# Patient Record
Sex: MALE | Race: WHITE | NOT HISPANIC OR LATINO | Employment: FULL TIME | URBAN - METROPOLITAN AREA
[De-identification: names, ages, dates, MRNs, and addresses within clinical notes are randomized per-mention and may not be internally consistent; named-entity substitution may affect disease eponyms.]

---

## 2017-09-11 ENCOUNTER — EH NON-PROVIDER (OUTPATIENT)
Dept: OCCUPATIONAL MEDICINE | Facility: CLINIC | Age: 32
End: 2017-09-11

## 2017-09-11 ENCOUNTER — EMPLOYEE HEALTH (OUTPATIENT)
Dept: OCCUPATIONAL MEDICINE | Facility: CLINIC | Age: 32
End: 2017-09-11

## 2017-09-11 ENCOUNTER — HOSPITAL ENCOUNTER (OUTPATIENT)
Facility: MEDICAL CENTER | Age: 32
End: 2017-09-11
Attending: PREVENTIVE MEDICINE
Payer: COMMERCIAL

## 2017-09-11 VITALS
BODY MASS INDEX: 31.68 KG/M2 | WEIGHT: 239 LBS | SYSTOLIC BLOOD PRESSURE: 114 MMHG | RESPIRATION RATE: 12 BRPM | TEMPERATURE: 98 F | DIASTOLIC BLOOD PRESSURE: 78 MMHG | HEART RATE: 72 BPM | OXYGEN SATURATION: 99 % | HEIGHT: 73 IN

## 2017-09-11 DIAGNOSIS — Z02.1 ENCOUNTER FOR PRE-EMPLOYMENT HEALTH SCREENING EXAMINATION: ICD-10-CM

## 2017-09-11 DIAGNOSIS — Z02.1 PRE-EMPLOYMENT DRUG SCREENING: ICD-10-CM

## 2017-09-11 LAB
AMP AMPHETAMINE: NORMAL
BAR BARBITURATES: NORMAL
BZO BENZODIAZEPINES: NORMAL
COC COCAINE: NORMAL
INT CON NEG: NORMAL
INT CON POS: NORMAL
MDMA ECSTASY: NORMAL
MET METHAMPHETAMINES: NORMAL
MTD METHADONE: NORMAL
OPI OPIATES: NORMAL
OXY OXYCODONE: NORMAL
PCP PHENCYCLIDINE: NORMAL
POC URINE DRUG SCREEN OCDRS: NORMAL
THC: NORMAL

## 2017-09-11 PROCEDURE — 86480 TB TEST CELL IMMUN MEASURE: CPT | Performed by: PREVENTIVE MEDICINE

## 2017-09-11 PROCEDURE — 94375 RESPIRATORY FLOW VOLUME LOOP: CPT | Performed by: PREVENTIVE MEDICINE

## 2017-09-11 PROCEDURE — 80305 DRUG TEST PRSMV DIR OPT OBS: CPT | Performed by: PREVENTIVE MEDICINE

## 2017-09-11 PROCEDURE — 8915 PR COMPREHENSIVE PHYSICAL: Performed by: PREVENTIVE MEDICINE

## 2017-09-11 PROCEDURE — 90686 IIV4 VACC NO PRSV 0.5 ML IM: CPT | Performed by: PREVENTIVE MEDICINE

## 2017-09-13 LAB
M TB TUBERC IFN-G BLD QL: NEGATIVE
M TB TUBERC IFN-G/MITOGEN IGNF BLD: -0
M TB TUBERC IGNF/MITOGEN IGNF CONTROL: 50.36 [IU]/ML
MITOGEN IGNF BCKGRD COR BLD-ACNC: 0.02 [IU]/ML

## 2018-06-21 ENCOUNTER — HOSPITAL ENCOUNTER (OUTPATIENT)
Facility: MEDICAL CENTER | Age: 33
End: 2018-06-21
Payer: COMMERCIAL

## 2018-06-21 LAB
BDY FAT % MEASURED: 22.4 %
BP DIAS: 76 MMHG
BP SYS: 127 MMHG
CHOLEST SERPL-MCNC: 212 MG/DL (ref 100–199)
DIABETES HTDIA: NO
EVENT NAME HTEVT: NORMAL
FASTING HTFAS: YES
GLUCOSE SERPL-MCNC: 112 MG/DL (ref 65–99)
HDLC SERPL-MCNC: 59 MG/DL
HYPERTENSION HTHYP: NO
LDLC SERPL CALC-MCNC: 122 MG/DL
SCREENING LOC CITY HTCIT: NORMAL
SCREENING LOC STATE HTSTA: NORMAL
SCREENING LOCATION HTLOC: NORMAL
SMOKING HTSMO: NO
SUBSCRIBER ID HTSID: NORMAL
TRIGL SERPL-MCNC: 155 MG/DL (ref 0–149)

## 2018-06-21 PROCEDURE — S5190 WELLNESS ASSESSMENT BY NONPH: HCPCS

## 2018-06-21 PROCEDURE — 82947 ASSAY GLUCOSE BLOOD QUANT: CPT

## 2018-06-21 PROCEDURE — 80061 LIPID PANEL: CPT

## 2018-10-05 ENCOUNTER — IMMUNIZATION (OUTPATIENT)
Dept: OCCUPATIONAL MEDICINE | Facility: CLINIC | Age: 33
End: 2018-10-05

## 2018-10-05 DIAGNOSIS — Z23 NEED FOR VACCINATION: ICD-10-CM

## 2018-10-16 ENCOUNTER — HOSPITAL ENCOUNTER (EMERGENCY)
Facility: MEDICAL CENTER | Age: 33
End: 2018-10-16
Attending: EMERGENCY MEDICINE
Payer: COMMERCIAL

## 2018-10-16 VITALS
SYSTOLIC BLOOD PRESSURE: 134 MMHG | OXYGEN SATURATION: 91 % | TEMPERATURE: 98.4 F | DIASTOLIC BLOOD PRESSURE: 83 MMHG | HEART RATE: 97 BPM | RESPIRATION RATE: 17 BRPM | HEIGHT: 73 IN | BODY MASS INDEX: 30.77 KG/M2 | WEIGHT: 232.14 LBS

## 2018-10-16 DIAGNOSIS — E86.0 DEHYDRATION: ICD-10-CM

## 2018-10-16 DIAGNOSIS — R10.84 GENERALIZED ABDOMINAL PAIN: ICD-10-CM

## 2018-10-16 LAB
ALBUMIN SERPL BCP-MCNC: 3.8 G/DL (ref 3.2–4.9)
ALBUMIN/GLOB SERPL: 1.4 G/DL
ALP SERPL-CCNC: 45 U/L (ref 30–99)
ALT SERPL-CCNC: 54 U/L (ref 2–50)
ANION GAP SERPL CALC-SCNC: 7 MMOL/L (ref 0–11.9)
AST SERPL-CCNC: 28 U/L (ref 12–45)
BASOPHILS # BLD AUTO: 0.2 % (ref 0–1.8)
BASOPHILS # BLD: 0.02 K/UL (ref 0–0.12)
BILIRUB SERPL-MCNC: 0.8 MG/DL (ref 0.1–1.5)
BUN SERPL-MCNC: 14 MG/DL (ref 8–22)
CALCIUM SERPL-MCNC: 8.6 MG/DL (ref 8.4–10.2)
CHLORIDE SERPL-SCNC: 103 MMOL/L (ref 96–112)
CO2 SERPL-SCNC: 24 MMOL/L (ref 20–33)
CREAT SERPL-MCNC: 1.03 MG/DL (ref 0.5–1.4)
EOSINOPHIL # BLD AUTO: 0 K/UL (ref 0–0.51)
EOSINOPHIL NFR BLD: 0 % (ref 0–6.9)
ERYTHROCYTE [DISTWIDTH] IN BLOOD BY AUTOMATED COUNT: 37 FL (ref 35.9–50)
GLOBULIN SER CALC-MCNC: 2.8 G/DL (ref 1.9–3.5)
GLUCOSE SERPL-MCNC: 121 MG/DL (ref 65–99)
HCT VFR BLD AUTO: 47.9 % (ref 42–52)
HGB BLD-MCNC: 16.6 G/DL (ref 14–18)
IMM GRANULOCYTES # BLD AUTO: 0.03 K/UL (ref 0–0.11)
IMM GRANULOCYTES NFR BLD AUTO: 0.3 % (ref 0–0.9)
LIPASE SERPL-CCNC: 25 U/L (ref 7–58)
LYMPHOCYTES # BLD AUTO: 0.38 K/UL (ref 1–4.8)
LYMPHOCYTES NFR BLD: 4.1 % (ref 22–41)
MCH RBC QN AUTO: 28.9 PG (ref 27–33)
MCHC RBC AUTO-ENTMCNC: 34.7 G/DL (ref 33.7–35.3)
MCV RBC AUTO: 83.3 FL (ref 81.4–97.8)
MONOCYTES # BLD AUTO: 0.76 K/UL (ref 0–0.85)
MONOCYTES NFR BLD AUTO: 8.2 % (ref 0–13.4)
NEUTROPHILS # BLD AUTO: 8.13 K/UL (ref 1.82–7.42)
NEUTROPHILS NFR BLD: 87.2 % (ref 44–72)
NRBC # BLD AUTO: 0 K/UL
NRBC BLD-RTO: 0 /100 WBC
PLATELET # BLD AUTO: 189 K/UL (ref 164–446)
PMV BLD AUTO: 9.7 FL (ref 9–12.9)
POTASSIUM SERPL-SCNC: 3.6 MMOL/L (ref 3.6–5.5)
PROT SERPL-MCNC: 6.6 G/DL (ref 6–8.2)
RBC # BLD AUTO: 5.75 M/UL (ref 4.7–6.1)
SODIUM SERPL-SCNC: 134 MMOL/L (ref 135–145)
WBC # BLD AUTO: 9.3 K/UL (ref 4.8–10.8)

## 2018-10-16 PROCEDURE — 36415 COLL VENOUS BLD VENIPUNCTURE: CPT

## 2018-10-16 PROCEDURE — 80053 COMPREHEN METABOLIC PANEL: CPT

## 2018-10-16 PROCEDURE — 700111 HCHG RX REV CODE 636 W/ 250 OVERRIDE (IP): Performed by: EMERGENCY MEDICINE

## 2018-10-16 PROCEDURE — 96361 HYDRATE IV INFUSION ADD-ON: CPT

## 2018-10-16 PROCEDURE — 96376 TX/PRO/DX INJ SAME DRUG ADON: CPT

## 2018-10-16 PROCEDURE — 99285 EMERGENCY DEPT VISIT HI MDM: CPT

## 2018-10-16 PROCEDURE — 83690 ASSAY OF LIPASE: CPT

## 2018-10-16 PROCEDURE — 85025 COMPLETE CBC W/AUTO DIFF WBC: CPT

## 2018-10-16 PROCEDURE — 96374 THER/PROPH/DIAG INJ IV PUSH: CPT

## 2018-10-16 PROCEDURE — 700105 HCHG RX REV CODE 258: Performed by: EMERGENCY MEDICINE

## 2018-10-16 RX ORDER — FLUTICASONE PROPIONATE 50 MCG
1 SPRAY, SUSPENSION (ML) NASAL DAILY
Status: SHIPPED | COMMUNITY
End: 2022-08-19 | Stop reason: SDUPTHER

## 2018-10-16 RX ORDER — SODIUM CHLORIDE 9 MG/ML
1000 INJECTION, SOLUTION INTRAVENOUS ONCE
Status: COMPLETED | OUTPATIENT
Start: 2018-10-16 | End: 2018-10-16

## 2018-10-16 RX ORDER — ONDANSETRON 4 MG/1
4 TABLET, ORALLY DISINTEGRATING ORAL EVERY 8 HOURS PRN
Qty: 15 TAB | Refills: 0 | Status: SHIPPED | OUTPATIENT
Start: 2018-10-16 | End: 2021-06-29

## 2018-10-16 RX ORDER — ONDANSETRON 2 MG/ML
4 INJECTION INTRAMUSCULAR; INTRAVENOUS ONCE
Status: COMPLETED | OUTPATIENT
Start: 2018-10-16 | End: 2018-10-16

## 2018-10-16 RX ORDER — FEXOFENADINE HCL 180 MG/1
180 TABLET ORAL DAILY
Status: SHIPPED | COMMUNITY
End: 2022-08-19 | Stop reason: SDUPTHER

## 2018-10-16 RX ADMIN — SODIUM CHLORIDE 1000 ML: 9 INJECTION, SOLUTION INTRAVENOUS at 16:27

## 2018-10-16 RX ADMIN — SODIUM CHLORIDE 1000 ML: 9 INJECTION, SOLUTION INTRAVENOUS at 17:43

## 2018-10-16 RX ADMIN — SODIUM CHLORIDE 1000 ML: 9 INJECTION, SOLUTION INTRAVENOUS at 15:25

## 2018-10-16 RX ADMIN — ONDANSETRON HYDROCHLORIDE 4 MG: 2 INJECTION INTRAMUSCULAR; INTRAVENOUS at 18:45

## 2018-10-16 RX ADMIN — ONDANSETRON 4 MG: 2 INJECTION INTRAMUSCULAR; INTRAVENOUS at 15:25

## 2018-10-16 RX ADMIN — SODIUM CHLORIDE 1000 ML: 9 INJECTION, SOLUTION INTRAVENOUS at 18:46

## 2018-10-16 ASSESSMENT — PAIN SCALES - GENERAL
PAINLEVEL_OUTOF10: 0
PAINLEVEL_OUTOF10: 0

## 2018-10-16 NOTE — ED TRIAGE NOTES
"Chief Complaint   Patient presents with   • Abdominal Pain     started yesterday   • N/V   • Diarrhea     > 28 times since last night     /83   Pulse (!) 115   Temp 36.7 °C (98.1 °F)   Resp 18   Ht 1.854 m (6' 1\")   Wt 105.3 kg (232 lb 2.3 oz)   SpO2 94%   BMI 30.63 kg/m²     "

## 2018-10-16 NOTE — ED PROVIDER NOTES
ED Provider Note    CHIEF COMPLAINT  Chief Complaint   Patient presents with   • Abdominal Pain     started yesterday   • N/V   • Diarrhea     > 28 times since last night       IRLANDA Wilson is a 33 y.o. male who presents severe diarrhea.  Patient states he ate ribs from a restaurant last night.  His wife did not eat these.  2-3 hours later he started feeling full in his epigastric area and then proceeded to have diarrhea multiple times.  He thinks he had more than 28 diarrhea stools.  Watery brown.  Crampiness in his abdomen vaguely throughout.  He did vomit once this morning.  He was not able to orally hydrate last night because he just could not tolerate any fluids at all.  Currently feels like he cannot hold anything down at all.  He is try to drink fluids and they did not stay in his system.  He has a very dry mouth at this time feeling weak in general.  Nothing makes it better, nothing makes them worse.    REVIEW OF SYSTEMS  CONSTITUTIONAL:  Denies fever, chills, positive generalized weakness  EYES:  Denies photophobia or discharge   ENT: Positive dry throat  CARDIOVASCULAR:  Denies chest pain, palpitations or swelling  RESPIRATORY:  Denies cough or shortness of breath or difficulty breathing  GI: Positive vague abdominal cramping with nausea and vomiting this morning and multiple diarrhea stools of brown stool  MUSCULOSKELETAL: Positive generalized weakness but no joint swelling or back pain  SKIN:  No rash  ALLERGIC: No itchy rashes.  NEUROLOGIC:  Denies headache, focal weakness or numbness      PAST MEDICAL HISTORY  Healthy male  Occasional sinus allergies    FAMILY HISTORY  History reviewed. No pertinent family history.    SOCIAL HISTORY   reports that he has never smoked. He does not have any smokeless tobacco history on file. He reports that he drinks alcohol. He reports that he does not use drugs.    SURGICAL HISTORY  History reviewed. No pertinent surgical history.    CURRENT  "MEDICATIONS  Over-the-counter antihistamines    ALLERGIES  No Known Allergies    PHYSICAL EXAM  VITAL SIGNS: /83   Pulse (!) 115   Temp 36.7 °C (98.1 °F)   Resp 18   Ht 1.854 m (6' 1\")   Wt 105.3 kg (232 lb 2.3 oz)   SpO2 94%   BMI 30.63 kg/m²      Constitutional: Patient is awake alert person place and time. No acute respiratory distress Well developed, Well nourished, Non-toxic appearance.   HENT: Normocephalic, Atraumatic,  Bilateral external ears normal, Oropharynx very dry.    Eyes: Sclera and conjunctiva clear, No discharge.   Neck:  Supple    Cardiovascular: Heart is tachycardic regular no murmur   thorax & Lungs: Chest is symmetrical, with good breath sounds. No wheezing or crackles. No respiratory distress, No chest tenderness.   Abdomen:  Soft, vague tenderness pan abdomen.  No guarding or rebound.  Skin: Warm, Dry, no petechia, purpura or rash.   Extremities: No  edema.  Non tender.   Musculoskeletal: Good range of motion upper and lower extremities.    Neurologic: Alert & oriented.  Strength is symmetrical in the upper lower extremities.  Psychiatric: Cooperative friendly.      LABS:  Lab Results   Component Value Date    WBC 9.3 10/16/2018    HEMOGLOBIN 16.6 10/16/2018    HEMATOCRIT 47.9 10/16/2018    PLATELETCT 189 10/16/2018    SODIUM 134 (L) 10/16/2018    POTASSIUM 3.6 10/16/2018    CHLORIDE 103 10/16/2018    CO2 24 10/16/2018    BUN 14 10/16/2018    GLUCOSE 121 (H) 10/16/2018    CHOLSTRLTOT 212 (H) 06/21/2018     (H) 06/21/2018    ALTSGPT 54 (H) 10/16/2018    ASTSGOT 28 10/16/2018           COURSE & MEDICAL DECISION MAKING  Pertinent Labs & Imaging studies reviewed. (See chart for details)  Patient is obviously dehydrated.  I suspect that this may be some type of viral or bacterial enteritis.  He does not appear to have a surgical abdomen at this time.  I am going to give him IV fluids because he is clinically dehydrated he is unable to tolerate oral fluids he is tachycardic and " he has not held anything down since yesterday.      1640: Recheck.  Patient is feeling better.  Not voided yet.  We will give him a second liter of fluid.      1730.  Patient sitting up.  He is been to the bathroom.  He feels much better.  No abdominal pain currently.  He would like to go home and orally hydrate.  He understands are still at risk of intra-abdominal surgical abnormalities include appendicitis or other causes however at this time he does feel much better.  He will be rechecked in 12-24 hours either here or his primary care doctor.      1640 IV fluid resuscitation reevaluation: Patient still tachycardic has not voided yet we will give him a second liter of fluid    1930.  He is feeling much improved.  He is voided now.  He is received several liters of fluid which I believe is appropriate since he had severe diarrhea and dehydration and tachycardia.  He now feels much improved mucous membranes are moist no longer lightheaded.  Patient's liver enzymes mildly elevated.  This is unclear why this is.  Cannot rule out some type of hepatitis although he does not have any abdominal pain at all think that he has gallbladder issues.  Previous viral infection.  Although he understands his glucose is mildly elevated and his liver enzyme is high his will need to be followed up as an outpatient.  If his diarrhea continues we will need to obtain stool samples.    Believe he is stable for discharge home for close follow-up          FINAL IMPRESSION  1.  Dehydration  2.  Diarrhea  3.  Abdominal discomfort resolved     PLAN  1.  Dehydration information she  2.  Diarrhea information sheet  3.  Follow-up with his primary care doctor tomorrow  4.  Zofran as needed nausea and vomiting  5. Return to the emergency department for increased pains, fevers, vomiting or change in condition.  Electronically signed by: Brian Reyez, 10/16/2018 2:55 PM

## 2018-10-17 NOTE — ED NOTES
ERP at bedside to re-evaluate pt.    Physical Therapy Daily Treatment     Visit Count: 8  Plan of Care Dates: Initial: 6/23/2017 Through: 9/15/2017  Insurance Information: physical and speech therapy combined cap of $1980/$3700 per calendar year  Next Referring Provider Visit: 10/20/17    Referred by: Henry Lopez MD  Medical Diagnosis (from order):    Quadriceps tendon rupture, right, sequela [S76.111S]       Quadriceps tendon rupture, left, sequela [S76.112S]       Insurance: 1. MEDICARE  2. [employer]  FOR LIFE    Date of Onset: December 31, 2014   Diagnosis Precautions: none  Chart reviewed: Relevant co-morbidities, allergies, tests and medications: Hx of A-Fib       SUBJECTIVE   Pt reports no pain in either knee, is feeling fatigued- has been having stomach issues. Current Pain: 0/10. Â   Functional Change: Pt has not been feeling fatigued this week so has not been doing many exercises. OBJECTIVE   Posture/Observation:  Standing posture: bilateral knees locked in extension with slight forward flexed posture, use of SPC for ambulation   Â   Gait Analysis:  Pt ambulating with SPC bilateral knees held in extension throughout stance phase    Â   Range of Motion (degrees) Norm Left Right   Date Â  Initial Initial   Knee Flexion 135  WNL WNL   Knee Extension 0-5 WNL WNL   standard testing positions unless otherwise noted; Key: ranges are reported in active range of motion unless noted as AA=active assistive or P=passive range of motion, * denotes pain   Comments: Only those motions that were assessed are noted.   Â   Strength (out of 5) Left Right   Date Initial Initial   Hip Flexion 4- 4   Hip Extension Â  Â    Hip Abduction Â  Â    Hip Glut Medius Â  Â    Hip Adduction  Â  Â    Hip Internal Rotation Â  Â    Hip External Rotation Â  Â    Knee Flexion 5 5   Knee Extension 4-* 3+*   Ankle Dorsiflexion Â  Â    Ankle Plantar flexion Â  Â    Ankle Inversion Â  Â    Ankle Eversion Â  Â    standard testing positions unless otherwise noted,* denotes pain  Comments: "Only muscle strength that was assessed are noted. Â   Muscle Flexibility:  Hamstring - Left: within normal limits, Right: within normal limits   Â   Palpation:  No tenderness with palpation   Notable divot through distal right quad with quad set   Â   Joint Play Assessment:  Increased joint play through bilateral patella with quad set   Â   Functional Tests:  Sit To Stand: good anterior weight shift, knees lock into extension then pt completes hip extension to get upright posture  Â   Outcome Measures:   Lower Extremity Functional Scale: LEFS Calculated Total: 33 (0=extreme difficulty; 80=no difficulty)     Treatment   Therapeutic Exercise:   Exercise Repetitions Sets Position/Cues   Nustep, seat 8, level 4 6 minutes         Sit<>stand from chair   -green TB at knees   -with airex, without UE assist  5  5  5 1  1  1 UE assist at railing to ascend and descend    4"" step up   -forward   -lateral up and over    5  4   1  1 Bilateral; with rail to assist    Staggered tandem stance  2x20 sec 1 Bilateral; at rail for support    Standing hip abduction  10 1 Bilateral; at rail for support                      Comments:        Current Home Program (not performed this date except as noted above):   Seated quad set with mini SLR   Seated SAQ  Standing calf raises at counter   Seated glut sets   Forward weight shifts   Clamshells   Sidelying hip abduction     ASSESSMENT   Pt reports no pain at end of session, just fatigued. Pt demonstrating improved sit to stand without UE assist from airex pad and improved anterior and lateral weight shift with knee flexed during step ups- will continue to address. Pt would benefit from continued skilled PT services in order to address functional deficits and progress toward goals. Pain after treatment: 0/10  Result of above outlined education: Verbalizes understanding and Demonstrates understanding    Goals: To be obtained by end of this plan of care:  1.  Patient independent with " modified and progressed home exercise program.  2. Patient will increase involved knee strength to 4+/5, 4/5 to aid in completing transfers including low and soft surfaces. 3. Patient will be able to ambulate modified independent/independent with least restrictive device for 30 minutes with minimal pain/difficulty to improve function in grocery shopping, ambulate to physician appointments and community interaction. 4. Patient will be able to ambulate 2 steps independent/modified independent for home access with minimal pain/difficulty. 5. Patient will be able to tolerate standing activities for greater than or equal to 30 minutes with minimal pain/difficulty  6. Lower Extremity Functional Scale: Patient will complete form to reflect an improved score from initial score of 33 to greater than or equal to 42 (0=extreme difficulty; 80=no difficulty) to indicate pt reported improvement in function/disability/impairment (minimal detectable change: 9 points). PLAN   Progress quad strengthening- trial TRX squats when able to ; sidestepping/hip abduction; leg press and calf raises on leg press; modified tandem balance with reaching     THERAPY DAILY BILLING   Primary Insurance: MEDICARE  Secondary Insurance: [facility]  FOR LIFE    Evaluation Procedures:  No evaluation codes were used on this date of service    Timed Procedures:  Therapeutic Exercise, 40 minutes    Untimed Procedures:  No untimed codes were used on this date of service    Total Treatment Time: 40 minutes        G-Code:  G-Code Score ABN form  reporting not required this treatment session  Modifier based on outcome measure(s)/functional testing/clinical judgement as listed above    The referring provider's electronic or written signature on the evaluation authorizes the therapy plan of care and certifies the need for these services, furnished under this plan of care while under their care. Physician Signature on file.

## 2018-10-17 NOTE — ED NOTES
2nd Liter NSS infused, pt had another loose BM, still unable to urinate. Dr. Reyez aware and placed order for another 1 L bolus.

## 2018-10-17 NOTE — ED NOTES
ERP and pt discussed d/c instructions, pt agreeable to plan. Patient verbalized understanding of discharge instructions, no questions at this time. VS stable, patient will ambulate to exit with d/c instructions and rx in hand.

## 2018-11-30 PROCEDURE — 90686 IIV4 VACC NO PRSV 0.5 ML IM: CPT | Mod: SPV | Performed by: PREVENTIVE MEDICINE

## 2018-11-30 PROCEDURE — 90471 IMMUNIZATION ADMIN: CPT | Mod: SPV | Performed by: PREVENTIVE MEDICINE

## 2019-09-11 ENCOUNTER — IMMUNIZATION (OUTPATIENT)
Dept: OCCUPATIONAL MEDICINE | Facility: CLINIC | Age: 34
End: 2019-09-11

## 2019-09-11 DIAGNOSIS — Z23 NEED FOR VACCINATION: ICD-10-CM

## 2019-09-11 PROCEDURE — 90686 IIV4 VACC NO PRSV 0.5 ML IM: CPT | Performed by: PREVENTIVE MEDICINE

## 2019-09-20 ENCOUNTER — HOSPITAL ENCOUNTER (OUTPATIENT)
Dept: LAB | Facility: MEDICAL CENTER | Age: 34
End: 2019-09-20
Payer: COMMERCIAL

## 2019-09-20 LAB
BDY FAT % MEASURED: 23.2 %
BP DIAS: 79 MMHG
BP SYS: 123 MMHG
CHOLEST SERPL-MCNC: 231 MG/DL (ref 100–199)
DIABETES HTDIA: NO
EVENT NAME HTEVT: NORMAL
FASTING HTFAS: YES
GLUCOSE SERPL-MCNC: 101 MG/DL (ref 65–99)
HDLC SERPL-MCNC: 70 MG/DL
HYPERTENSION HTHYP: NO
LDLC SERPL CALC-MCNC: 136 MG/DL
SCREENING LOC CITY HTCIT: NORMAL
SCREENING LOC STATE HTSTA: NORMAL
SCREENING LOCATION HTLOC: NORMAL
SMOKING HTSMO: NO
SUBSCRIBER ID HTSID: NORMAL
TRIGL SERPL-MCNC: 126 MG/DL (ref 0–149)

## 2019-09-20 PROCEDURE — S5190 WELLNESS ASSESSMENT BY NONPH: HCPCS

## 2019-09-20 PROCEDURE — 82947 ASSAY GLUCOSE BLOOD QUANT: CPT

## 2019-09-20 PROCEDURE — 36415 COLL VENOUS BLD VENIPUNCTURE: CPT

## 2019-09-20 PROCEDURE — 80061 LIPID PANEL: CPT

## 2020-07-29 ENCOUNTER — OFFICE VISIT (OUTPATIENT)
Dept: PHYSICAL MEDICINE AND REHAB | Facility: MEDICAL CENTER | Age: 35
End: 2020-07-29
Payer: COMMERCIAL

## 2020-07-29 VITALS
DIASTOLIC BLOOD PRESSURE: 68 MMHG | SYSTOLIC BLOOD PRESSURE: 124 MMHG | HEART RATE: 83 BPM | TEMPERATURE: 97.2 F | OXYGEN SATURATION: 94 %

## 2020-07-29 DIAGNOSIS — S93.422A SPRAIN OF DELTOID LIGAMENT OF LEFT ANKLE, INITIAL ENCOUNTER: ICD-10-CM

## 2020-07-29 DIAGNOSIS — S93.421A SPRAIN OF DELTOID LIGAMENT OF RIGHT ANKLE, INITIAL ENCOUNTER: ICD-10-CM

## 2020-07-29 PROCEDURE — 99204 OFFICE O/P NEW MOD 45 MIN: CPT | Performed by: PHYSICAL MEDICINE & REHABILITATION

## 2020-07-29 RX ORDER — MELOXICAM 15 MG/1
15 TABLET ORAL DAILY
Qty: 30 TAB | Refills: 0 | Status: SHIPPED | OUTPATIENT
Start: 2020-07-29 | End: 2020-08-28

## 2020-07-29 NOTE — PROGRESS NOTES
New patient note    Physiatry (physical medicine and  Rehabilitation), interventional spine and sports medicine    Date of Service: 07/29/2020    Chief complaint:   Chief Complaint   Patient presents with   • New Patient     Knee and ankle pain       HISTORY    HPI: Bassem Wilson MD 35 y.o. male who presents today for evaluation of chronic ankle pain.  He reports that he has had chronic ankle sprains that started in high-school while playing basketball.  He wears braces for ankle support when he hikes and plays basketball that helps him prevent rolling his ankles.  He has done PT in the past.  This seems to help minimize pain, but his splints are worn out and he needs new ones.    He also has a history of knee pain.  He reports Partial ACL right, MCL Left, medial meniscus bilaterally.  He has also used knee braces.    No acute symptoms or swelling    Remote history of injections.     He has taken mobic for relief of symptoms in the past.      Medical records review:    Previous treatments:    Physical Therapy: Yes    Medications the patient is tried: NSAIDs    Previous interventions: none    Previous surgeries to relieve the above pain:  none      ROS:   Red Flags ROS:   Fever, Chills, Sweats: Denies  Involuntary Weight Loss: Denies  Bladder Incontinence: Denies  Bowel Incontinence: Denies  Saddle Anesthesia: Denies    All other systems reviewed and negative.       PMHx:   History reviewed. No pertinent past medical history.    PSHx:   History reviewed. No pertinent surgical history.    Family history   History reviewed. No pertinent family history.      Medications:   Current Outpatient Medications   Medication   • meloxicam (MOBIC) 15 MG tablet   • multivitamin (THERAGRAN) Tab   • fluticasone (FLONASE) 50 MCG/ACT nasal spray   • fexofenadine (ALLEGRA ALLERGY) 180 MG tablet   • ondansetron (ZOFRAN ODT) 4 MG TABLET DISPERSIBLE     No current facility-administered medications for this visit.        Allergies:  "  Allergies   Allergen Reactions   • Ancef [Cefazolin] Anaphylaxis     \"difficulty breathing\"   • Amoxicillin Hives     \"hives\"       Social Hx:   Social History     Socioeconomic History   • Marital status:      Spouse name: Not on file   • Number of children: Not on file   • Years of education: Not on file   • Highest education level: Not on file   Occupational History   • Not on file   Social Needs   • Financial resource strain: Not on file   • Food insecurity     Worry: Not on file     Inability: Not on file   • Transportation needs     Medical: Not on file     Non-medical: Not on file   Tobacco Use   • Smoking status: Never Smoker   • Smokeless tobacco: Never Used   Substance and Sexual Activity   • Alcohol use: Yes   • Drug use: No   • Sexual activity: Not on file   Lifestyle   • Physical activity     Days per week: Not on file     Minutes per session: Not on file   • Stress: Not on file   Relationships   • Social connections     Talks on phone: Not on file     Gets together: Not on file     Attends [employer] service: Not on file     Active member of club or organization: Not on file     Attends meetings of clubs or organizations: Not on file     Relationship status: Not on file   • Intimate partner violence     Fear of current or ex partner: Not on file     Emotionally abused: Not on file     Physically abused: Not on file     Forced sexual activity: Not on file   Other Topics Concern   • Not on file   Social History Narrative   • Not on file         EXAMINATION     Physical Exam:   Vitals: /68 (BP Location: Left arm, Patient Position: Sitting)   Pulse 83   Temp 36.2 °C (97.2 °F) (Temporal)   SpO2 94%     Constitutional:   Cooperation: Fully cooperates with exam  Appearance: Well-groomed, well-nourished, not disheveled, in no acute distress    Eyes: No scleral icterus, no proptosis     ENT -no obvious auditory deficits, wearing a face mask    Skin -no rashes or lesions noted     Respiratory-  " breathing comfortable on room air, no audible wheezing    Cardiovascular- No lower extremity edema is noted.     Psychiatric- alert and oriented ×3. Normal affect.     Gait - normal gait, no use of ambulatory device, nonantalgic. He is able to perform heel and toe walking without difficulty    Musculoskeletal -     Thoracic/Lumbar Spine/Sacral Spine/Hips   Inspection: No evidence of atrophy in bilateral lower extremities throughout     Knees  No effusions.  No medial or lateral joint line tenderness.  No medial or lateral instability.  Negative Lachman's bilaterally    Ankles  No effusions.  There is increased laxity of inversion bilaterally.  No tenderness over the joint, no tenderness over the left or right lateral ankle    Neuro     Key points for the international standards for neurological classification of spinal cord injury (ISNCSCI) to light touch.     Dermatome R L   L2 2 2   L3 2 2   L4 2 2   L5 2 2   S1 2 2   S2 2 2       Motor Exam Lower Extremities    ? Myotome R L   Hip flexion L2 5 5   Knee extension L3 5 5   Ankle dorsiflexion L4 5 5   Toe extension L5 5 5   Ankle plantarflexion S1 5 5       Clonus of the ankle negative bilaterally     Reflexes  ?  R L   Biceps  2+ 2+   Brachioradialis  2+ 2+   Patella  2+ 2+   Achilles   2+ 2+       MEDICAL DECISION MAKING    Medical records review: see under HPI section.     DATA    Labs:   Lab Results   Component Value Date/Time    SODIUM 134 (L) 10/16/2018 03:28 PM    POTASSIUM 3.6 10/16/2018 03:28 PM    CHLORIDE 103 10/16/2018 03:28 PM    CO2 24 10/16/2018 03:28 PM    ANION 7.0 10/16/2018 03:28 PM    GLUCOSE 101 (H) 09/20/2019 07:52 AM    BUN 14 10/16/2018 03:28 PM    CREATININE 1.03 10/16/2018 03:28 PM    CALCIUM 8.6 10/16/2018 03:28 PM    ASTSGOT 28 10/16/2018 03:28 PM    ALTSGPT 54 (H) 10/16/2018 03:28 PM    TBILIRUBIN 0.8 10/16/2018 03:28 PM    ALBUMIN 3.8 10/16/2018 03:28 PM    TOTPROTEIN 6.6 10/16/2018 03:28 PM    GLOBULIN 2.8 10/16/2018 03:28 PM     AGRATIO 1.4 10/16/2018 03:28 PM       No results found for: PROTHROMBTM, INR     Lab Results   Component Value Date/Time    WBC 9.3 10/16/2018 03:28 PM    RBC 5.75 10/16/2018 03:28 PM    HEMOGLOBIN 16.6 10/16/2018 03:28 PM    HEMATOCRIT 47.9 10/16/2018 03:28 PM    MCV 83.3 10/16/2018 03:28 PM    MCH 28.9 10/16/2018 03:28 PM    MCHC 34.7 10/16/2018 03:28 PM    MPV 9.7 10/16/2018 03:28 PM    NEUTSPOLYS 87.20 (H) 10/16/2018 03:28 PM    LYMPHOCYTES 4.10 (L) 10/16/2018 03:28 PM    MONOCYTES 8.20 10/16/2018 03:28 PM    EOSINOPHILS 0.00 10/16/2018 03:28 PM    BASOPHILS 0.20 10/16/2018 03:28 PM        No results found for: HBA1C     Imaging: I personally reviewed following images, these are my reads  None available    IMAGING radiology reads. I reviewed the following radiology reads None available                                                                                                                                                                              Diagnosis   Visit Diagnoses     ICD-10-CM   1. Sprain of deltoid ligament of right ankle, initial encounter  S93.421A   2. Sprain of deltoid ligament of left ankle, initial encounter  S93.422A           ASSESSMENT:  Bassem Wilson MD 35 y.o. male seen for above     Bassem was seen today for new patient.    Diagnoses and all orders for this visit:    Sprain of deltoid ligament of right ankle, initial encounter  -     meloxicam (MOBIC) 15 MG tablet; Take 1 Tab by mouth every day for 30 days.    Sprain of deltoid ligament of left ankle, initial encounter  -     meloxicam (MOBIC) 15 MG tablet; Take 1 Tab by mouth every day for 30 days.      1. Discussed use of ankle supports for the left and right.  No acute findings, with some evidence of laxity.  Plan to continue to use ankle supports, no xrays ordered.  2. Use of mobic for severe pain.  Script given.  3. Discussed that we could try to get old records as it relates to his knees.  Hold for now.  No plans for  treatment at this time, no acute pain.    Follow-up: Return if symptoms worsen or fail to improve.        Please note that this dictation was created using voice recognition software. I have made every reasonable attempt to correct obvious errors but there may be errors of grammar and content that I may have overlooked prior to finalization of this note.      Peter Nguyen MD  Physical Medicine and Rehabilitation  Interventional Spine and Sports Physiatry  [facility]

## 2020-09-23 ENCOUNTER — IMMUNIZATION (OUTPATIENT)
Dept: OCCUPATIONAL MEDICINE | Facility: CLINIC | Age: 35
End: 2020-09-23

## 2020-09-23 DIAGNOSIS — Z23 NEED FOR VACCINATION: ICD-10-CM

## 2020-09-23 PROCEDURE — 90686 IIV4 VACC NO PRSV 0.5 ML IM: CPT | Performed by: PREVENTIVE MEDICINE

## 2020-12-16 DIAGNOSIS — Z23 NEED FOR VACCINATION: ICD-10-CM

## 2020-12-17 ENCOUNTER — APPOINTMENT (OUTPATIENT)
Dept: FAMILY PLANNING/WOMEN'S HEALTH CLINIC | Facility: IMMUNIZATION CENTER | Age: 35
End: 2020-12-17
Attending: FAMILY MEDICINE
Payer: COMMERCIAL

## 2020-12-17 DIAGNOSIS — Z23 NEED FOR VACCINATION: ICD-10-CM

## 2020-12-17 PROCEDURE — 0001A PFIZER SARS-COV-2 VACCINE: CPT

## 2020-12-17 PROCEDURE — 91300 PFIZER SARS-COV-2 VACCINE: CPT

## 2020-12-18 ENCOUNTER — IMMUNIZATION (OUTPATIENT)
Dept: FAMILY PLANNING/WOMEN'S HEALTH CLINIC | Facility: IMMUNIZATION CENTER | Age: 35
End: 2020-12-18

## 2020-12-18 DIAGNOSIS — Z23 ENCOUNTER FOR VACCINATION: Primary | ICD-10-CM

## 2021-01-08 ENCOUNTER — IMMUNIZATION (OUTPATIENT)
Dept: FAMILY PLANNING/WOMEN'S HEALTH CLINIC | Facility: IMMUNIZATION CENTER | Age: 36
End: 2021-01-08
Attending: FAMILY MEDICINE
Payer: COMMERCIAL

## 2021-01-08 DIAGNOSIS — Z23 ENCOUNTER FOR VACCINATION: Primary | ICD-10-CM

## 2021-01-08 PROCEDURE — 91300 PFIZER SARS-COV-2 VACCINE: CPT

## 2021-01-08 PROCEDURE — 0002A PFIZER SARS-COV-2 VACCINE: CPT

## 2021-06-29 ENCOUNTER — OFFICE VISIT (OUTPATIENT)
Dept: URGENT CARE | Facility: CLINIC | Age: 36
End: 2021-06-29
Payer: COMMERCIAL

## 2021-06-29 ENCOUNTER — APPOINTMENT (OUTPATIENT)
Dept: URGENT CARE | Facility: CLINIC | Age: 36
End: 2021-06-29
Payer: COMMERCIAL

## 2021-06-29 ENCOUNTER — APPOINTMENT (OUTPATIENT)
Dept: RADIOLOGY | Facility: MEDICAL CENTER | Age: 36
End: 2021-06-29
Attending: EMERGENCY MEDICINE
Payer: COMMERCIAL

## 2021-06-29 ENCOUNTER — APPOINTMENT (OUTPATIENT)
Dept: RADIOLOGY | Facility: IMAGING CENTER | Age: 36
End: 2021-06-29
Attending: PHYSICIAN ASSISTANT
Payer: COMMERCIAL

## 2021-06-29 ENCOUNTER — HOSPITAL ENCOUNTER (EMERGENCY)
Facility: MEDICAL CENTER | Age: 36
End: 2021-06-29
Attending: EMERGENCY MEDICINE
Payer: COMMERCIAL

## 2021-06-29 VITALS
TEMPERATURE: 98.1 F | HEART RATE: 82 BPM | SYSTOLIC BLOOD PRESSURE: 114 MMHG | RESPIRATION RATE: 20 BRPM | BODY MASS INDEX: 32.2 KG/M2 | HEIGHT: 73 IN | DIASTOLIC BLOOD PRESSURE: 68 MMHG | OXYGEN SATURATION: 97 % | WEIGHT: 243 LBS

## 2021-06-29 VITALS
TEMPERATURE: 97.4 F | DIASTOLIC BLOOD PRESSURE: 88 MMHG | OXYGEN SATURATION: 96 % | HEIGHT: 73 IN | WEIGHT: 244.49 LBS | BODY MASS INDEX: 32.4 KG/M2 | RESPIRATION RATE: 16 BRPM | HEART RATE: 82 BPM | SYSTOLIC BLOOD PRESSURE: 127 MMHG

## 2021-06-29 DIAGNOSIS — R00.0 TACHYCARDIA: ICD-10-CM

## 2021-06-29 DIAGNOSIS — R07.81 PLEURITIC CHEST PAIN: ICD-10-CM

## 2021-06-29 DIAGNOSIS — R07.2 PRECORDIAL CHEST PAIN: ICD-10-CM

## 2021-06-29 LAB
ALBUMIN SERPL BCP-MCNC: 4.4 G/DL (ref 3.2–4.9)
ALBUMIN/GLOB SERPL: 1.8 G/DL
ALP SERPL-CCNC: 55 U/L (ref 30–99)
ALT SERPL-CCNC: 68 U/L (ref 2–50)
ANION GAP SERPL CALC-SCNC: 11 MMOL/L (ref 7–16)
AST SERPL-CCNC: 33 U/L (ref 12–45)
BASOPHILS # BLD AUTO: 0.5 % (ref 0–1.8)
BASOPHILS # BLD: 0.03 K/UL (ref 0–0.12)
BILIRUB SERPL-MCNC: 0.5 MG/DL (ref 0.1–1.5)
BUN SERPL-MCNC: 14 MG/DL (ref 8–22)
CALCIUM SERPL-MCNC: 9.2 MG/DL (ref 8.4–10.2)
CHLORIDE SERPL-SCNC: 101 MMOL/L (ref 96–112)
CO2 SERPL-SCNC: 25 MMOL/L (ref 20–33)
CREAT SERPL-MCNC: 0.91 MG/DL (ref 0.5–1.4)
D DIMER PPP IA.FEU-MCNC: 0.42 UG/ML (FEU) (ref 0–0.5)
EKG IMPRESSION: NORMAL
EOSINOPHIL # BLD AUTO: 0.05 K/UL (ref 0–0.51)
EOSINOPHIL NFR BLD: 0.8 % (ref 0–6.9)
ERYTHROCYTE [DISTWIDTH] IN BLOOD BY AUTOMATED COUNT: 36.8 FL (ref 35.9–50)
FLUAV RNA SPEC QL NAA+PROBE: NEGATIVE
FLUBV RNA SPEC QL NAA+PROBE: NEGATIVE
GLOBULIN SER CALC-MCNC: 2.5 G/DL (ref 1.9–3.5)
GLUCOSE SERPL-MCNC: 140 MG/DL (ref 65–99)
HCT VFR BLD AUTO: 45.6 % (ref 42–52)
HGB BLD-MCNC: 15.8 G/DL (ref 14–18)
IMM GRANULOCYTES # BLD AUTO: 0.02 K/UL (ref 0–0.11)
IMM GRANULOCYTES NFR BLD AUTO: 0.3 % (ref 0–0.9)
LYMPHOCYTES # BLD AUTO: 1.72 K/UL (ref 1–4.8)
LYMPHOCYTES NFR BLD: 27.9 % (ref 22–41)
MCH RBC QN AUTO: 29.2 PG (ref 27–33)
MCHC RBC AUTO-ENTMCNC: 34.6 G/DL (ref 33.7–35.3)
MCV RBC AUTO: 84.3 FL (ref 81.4–97.8)
MONOCYTES # BLD AUTO: 0.6 K/UL (ref 0–0.85)
MONOCYTES NFR BLD AUTO: 9.7 % (ref 0–13.4)
NEUTROPHILS # BLD AUTO: 3.74 K/UL (ref 1.82–7.42)
NEUTROPHILS NFR BLD: 60.8 % (ref 44–72)
NRBC # BLD AUTO: 0 K/UL
NRBC BLD-RTO: 0 /100 WBC
PLATELET # BLD AUTO: 228 K/UL (ref 164–446)
PMV BLD AUTO: 9.7 FL (ref 9–12.9)
POTASSIUM SERPL-SCNC: 3.9 MMOL/L (ref 3.6–5.5)
PROT SERPL-MCNC: 6.9 G/DL (ref 6–8.2)
RBC # BLD AUTO: 5.41 M/UL (ref 4.7–6.1)
RSV RNA SPEC QL NAA+PROBE: NEGATIVE
SARS-COV-2 RNA RESP QL NAA+PROBE: NOTDETECTED
SODIUM SERPL-SCNC: 137 MMOL/L (ref 135–145)
SPECIMEN SOURCE: NORMAL
TROPONIN T SERPL-MCNC: <6 NG/L (ref 6–19)
WBC # BLD AUTO: 6.2 K/UL (ref 4.8–10.8)

## 2021-06-29 PROCEDURE — 93005 ELECTROCARDIOGRAM TRACING: CPT | Performed by: EMERGENCY MEDICINE

## 2021-06-29 PROCEDURE — 84484 ASSAY OF TROPONIN QUANT: CPT

## 2021-06-29 PROCEDURE — 93005 ELECTROCARDIOGRAM TRACING: CPT

## 2021-06-29 PROCEDURE — 85025 COMPLETE CBC W/AUTO DIFF WBC: CPT

## 2021-06-29 PROCEDURE — 93000 ELECTROCARDIOGRAM COMPLETE: CPT | Performed by: PHYSICIAN ASSISTANT

## 2021-06-29 PROCEDURE — 99284 EMERGENCY DEPT VISIT MOD MDM: CPT

## 2021-06-29 PROCEDURE — 0241U HCHG SARS-COV-2 COVID-19 NFCT DS RESP RNA 4 TRGT MIC: CPT

## 2021-06-29 PROCEDURE — 71045 X-RAY EXAM CHEST 1 VIEW: CPT

## 2021-06-29 PROCEDURE — 85379 FIBRIN DEGRADATION QUANT: CPT

## 2021-06-29 PROCEDURE — 80053 COMPREHEN METABOLIC PANEL: CPT

## 2021-06-29 PROCEDURE — 71046 X-RAY EXAM CHEST 2 VIEWS: CPT | Mod: TC,FY | Performed by: PHYSICIAN ASSISTANT

## 2021-06-29 PROCEDURE — C9803 HOPD COVID-19 SPEC COLLECT: HCPCS | Performed by: EMERGENCY MEDICINE

## 2021-06-29 PROCEDURE — 36415 COLL VENOUS BLD VENIPUNCTURE: CPT

## 2021-06-29 PROCEDURE — 99204 OFFICE O/P NEW MOD 45 MIN: CPT | Performed by: PHYSICIAN ASSISTANT

## 2021-06-29 RX ORDER — ALBUTEROL SULFATE 90 UG/1
2 AEROSOL, METERED RESPIRATORY (INHALATION) EVERY 6 HOURS PRN
Qty: 8.5 G | Refills: 0 | Status: SHIPPED | OUTPATIENT
Start: 2021-06-29 | End: 2022-08-19 | Stop reason: SDUPTHER

## 2021-06-29 RX ORDER — IBUPROFEN 200 MG
400 TABLET ORAL EVERY 6 HOURS PRN
COMMUNITY
End: 2021-07-16

## 2021-06-29 RX ORDER — CLINDAMYCIN HYDROCHLORIDE 300 MG/1
CAPSULE ORAL
COMMUNITY
Start: 2021-05-12 | End: 2021-06-29

## 2021-06-29 RX ORDER — IBUPROFEN 800 MG/1
800 TABLET ORAL
COMMUNITY
Start: 2021-05-12 | End: 2021-06-29

## 2021-06-29 RX ORDER — CHLORHEXIDINE GLUCONATE ORAL RINSE 1.2 MG/ML
SOLUTION DENTAL
COMMUNITY
Start: 2021-05-12 | End: 2021-06-29

## 2021-06-29 ASSESSMENT — ENCOUNTER SYMPTOMS
NEAR-SYNCOPE: 0
CLAUDICATION: 0
DIZZINESS: 1
NAUSEA: 0

## 2021-06-29 ASSESSMENT — PAIN SCALES - GENERAL: PAINLEVEL: 7=MODERATE-SEVERE PAIN

## 2021-06-29 NOTE — ED PROVIDER NOTES
"ED Provider Note    ED Provider    Means of arrival: Private vehicle  History obtained from: Patient  History limited by: None    CHIEF COMPLAINT  Chief Complaint   Patient presents with   • Chest Pain     Gradual onset L sided sharp chest pain starting today.    • Shortness of Breath     Today along with CP.        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  The pain is worse with taking deep breaths.  There is no discomfort with movement of the arm.    The pain does not radiate.  Is mild, waxing waning.    He has no history of cardiac disease no history of lung disease.  No history of diabetes high blood pressure or high cholesterol.  He is a non-smoker.    He has been in a car for an extended period time driving, along with being with other people.    No recent fevers chills cough congestion or flulike symptoms.  No nausea vomiting or sweating.    REVIEW OF SYSTEMS  See HPI for further details. All other systems are negative.     PAST MEDICAL HISTORY       SOCIAL HISTORY  Social History     Tobacco Use   • Smoking status: Never Smoker   • Smokeless tobacco: Never Used   Vaping Use   • Vaping Use: Never used   Substance and Sexual Activity   • Alcohol use: Yes   • Drug use: No   • Sexual activity: Not on file       SURGICAL HISTORY  patient denies any surgical history    CURRENT MEDICATIONS  Home Medications    **Home medications have not yet been reviewed for this encounter**         ALLERGIES  Allergies   Allergen Reactions   • Ancef [Cefazolin] Anaphylaxis     \"difficulty breathing\"   • Amoxicillin Hives     \"hives\"       PHYSICAL EXAM  VITAL SIGNS: /88   Pulse 82   Temp 36.3 °C (97.4 °F) (Temporal)   Resp 16   Ht 1.854 m (6' 1\")   Wt 111 kg (244 lb 7.8 oz)   SpO2 96%   BMI 32.26 kg/m²   Constitutional: Alert in no apparent distress.  HENT: No signs of trauma, Mucous membranes are moist   Eyes:  Conjunctiva normal, Non-icteric. "   Neck: Normal range of motion, No tenderness, Supple,  Lymphatic: No lymphadenopathy noted.   Cardiovascular: Regular rate and rhythm, no murmurs.   Thorax & Lungs: Normal breath sounds, No respiratory distress, No wheezing, No chest tenderness.   Abdomen: Bowel sounds normal, Soft, No tenderness, No masses, No pulsatile masses. No peritoneal signs.  Skin: Warm, Dry,Normal color  Back: No bony tenderness, No CVA tenderness.   Extremities:No edema, No tenderness, No cyanosis,    Musculoskeletal: Good range of motion in all major joints. No tenderness to palpation or major deformities noted.   Neurologic: Alert ,Oriented x4, Normal motor function, Normal sensory function, No focal deficits noted.   Psychiatric: Affect normal, Judgment normal, Mood normal.       MEDICAL DECISION MAKING  This is a 35 y.o. male who presents symptoms as described above.  There is concerns for cardiac disease, pulmonary embolism, and musculoskeletal pain.  Covid test will be done.  Troponin and D-dimer were done for evaluation    DIAGNOSTIC STUDIES / PROCEDURES    EKG  Results for orders placed or performed during the hospital encounter of 06/29/21   CBC with Differential   Result Value Ref Range    WBC 6.2 4.8 - 10.8 K/uL    RBC 5.41 4.70 - 6.10 M/uL    Hemoglobin 15.8 14.0 - 18.0 g/dL    Hematocrit 45.6 42.0 - 52.0 %    MCV 84.3 81.4 - 97.8 fL    MCH 29.2 27.0 - 33.0 pg    MCHC 34.6 33.7 - 35.3 g/dL    RDW 36.8 35.9 - 50.0 fL    Platelet Count 228 164 - 446 K/uL    MPV 9.7 9.0 - 12.9 fL    Neutrophils-Polys 60.80 44.00 - 72.00 %    Lymphocytes 27.90 22.00 - 41.00 %    Monocytes 9.70 0.00 - 13.40 %    Eosinophils 0.80 0.00 - 6.90 %    Basophils 0.50 0.00 - 1.80 %    Immature Granulocytes 0.30 0.00 - 0.90 %    Nucleated RBC 0.00 /100 WBC    Neutrophils (Absolute) 3.74 1.82 - 7.42 K/uL    Lymphs (Absolute) 1.72 1.00 - 4.80 K/uL    Monos (Absolute) 0.60 0.00 - 0.85 K/uL    Eos (Absolute) 0.05 0.00 - 0.51 K/uL    Baso (Absolute) 0.03 0.00 -  0.12 K/uL    Immature Granulocytes (abs) 0.02 0.00 - 0.11 K/uL    NRBC (Absolute) 0.00 K/uL   Complete Metabolic Panel (CMP)   Result Value Ref Range    Sodium 137 135 - 145 mmol/L    Potassium 3.9 3.6 - 5.5 mmol/L    Chloride 101 96 - 112 mmol/L    Co2 25 20 - 33 mmol/L    Anion Gap 11.0 7.0 - 16.0    Glucose 140 (H) 65 - 99 mg/dL    Bun 14 8 - 22 mg/dL    Creatinine 0.91 0.50 - 1.40 mg/dL    Calcium 9.2 8.4 - 10.2 mg/dL    AST(SGOT) 33 12 - 45 U/L    ALT(SGPT) 68 (H) 2 - 50 U/L    Alkaline Phosphatase 55 30 - 99 U/L    Total Bilirubin 0.5 0.1 - 1.5 mg/dL    Albumin 4.4 3.2 - 4.9 g/dL    Total Protein 6.9 6.0 - 8.2 g/dL    Globulin 2.5 1.9 - 3.5 g/dL    A-G Ratio 1.8 g/dL   Troponin   Result Value Ref Range    Troponin T <6 6 - 19 ng/L   D-DIMER   Result Value Ref Range    D-Dimer Screen 0.42 0.00 - 0.50 ug/mL (FEU)   COV-2, FLU A/B, AND RSV BY PCR (2-4 HOURS CEPHEID): Collect NP swab in VTM    Specimen: Respirate   Result Value Ref Range    Influenza virus A RNA Negative Negative    Influenza virus B, PCR Negative Negative    RSV, PCR Negative Negative    SARS-CoV-2 by PCR NotDetected     SARS-CoV-2 Source NP Swab    ESTIMATED GFR   Result Value Ref Range    GFR If African American >60 >60 mL/min/1.73 m 2    GFR If Non African American >60 >60 mL/min/1.73 m 2   EKG   Result Value Ref Range    Report       [facility].    Test Date:  2021  Pt Name:    JOSÉ MIGUEL KUNZ                Department: [facility]  MRN:        [id number]                      Room:  Gender:     Male                         Technician: [id number]  :        1985                   Requested By:ER TRIAGE PROTOCOL  Order #:    [id number]                    Reading MD: FLAKITO ALEJO DMIKEY    Measurements  Intervals                                Axis  Rate:       84                           P:          56  KS:         149                          QRS:        53  QRSD:       88                           T:           53  QT:         359  QTc:        425    Interpretive Statements  Sinus rhythm  No previous ECG available for comparison  Electronically Signed On 6- 16:48:01 PDT by FLAKITO ALEJO D.O.           LABS      RADIOLOGY  DX-CHEST-PORTABLE (1 VIEW)   Final Result      No radiographic evidence of acute cardiopulmonary process.          COURSE  Pertinent Labs & Imaging studies reviewed. (See chart for details)    4:49 PM - Patient seen and examined at bedside. Discussed plan of care,      This patient has normal troponin and EKG despite several hours of discomfort.  He has no cardiac risk factors and I do not believe this is related to cardiac disease.    Cardiology consult will be obtained as an outpatient he is stable for discharge home.    His blood sugar is 140 which is mildly elevated he did eat today, and that may be the cause of this he is informed of this so that he can get this monitored and evaluated as an outpatient.    Discharged home in stable condition    FINAL IMPRESSION  1. Precordial chest pain        The note accurately reflects work and decisions made by me.  Flakito Alejo D.O.  6/29/2021  4:52 PM

## 2021-06-29 NOTE — PROGRESS NOTES
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 Episode onset: 2 days. The problem occurs intermittently. The problem has been unchanged. The pain is present in the substernal region. The pain is moderate. Associated symptoms include dizziness. Pertinent negatives include no claudication, nausea or near-syncope. The pain is aggravated by deep breathing.   His family medical history is significant for hyperlipidemia.   No personal history of cardiac related issues.  Symptoms are not reproducible with movement.  No notable injury or trauma to the area.  Patient states he was in surgery today and noticed that he became very lightheaded and his heart rate was elevated. States his oxygen has been down in 93 to 97 range when typically is at 100. 2/2 COVID vaccine  PMH:  has no past medical history on file.  MEDS:   Current Outpatient Medications:   •  ibuprofen (MOTRIN) 200 MG Tab, Take 400 mg by mouth every 6 hours as needed., Disp: , Rfl:   •  multivitamin (THERAGRAN) Tab, Take 1 Tab by mouth every day., Disp: , Rfl:   •  fluticasone (FLONASE) 50 MCG/ACT nasal spray, Spray 1 Spray in nose every day., Disp: , Rfl:   •  fexofenadine (ALLEGRA ALLERGY) 180 MG tablet, Take 180 mg by mouth every day., Disp: , Rfl:   ALLERGIES:   Allergies   Allergen Reactions   • Ancef [Cefazolin] Anaphylaxis     \"difficulty breathing\"   • Amoxicillin Hives     \"hives\"     SURGHX: History reviewed. No pertinent surgical history.  SOCHX:  reports that he has never smoked. He has never used smokeless tobacco. He reports current alcohol use. He reports that he does not use drugs.  FH: No early onset of family cardiac related illness    Review of Systems   Cardiovascular: Positive for chest pain. Negative for claudication and " "near-syncope.   Gastrointestinal: Negative for nausea.   Neurological: Positive for dizziness.      Objective:   /68 (BP Location: Left arm, Patient Position: Sitting, BP Cuff Size: Adult long)   Pulse 82   Temp 36.7 °C (98.1 °F) (Temporal)   Resp 20   Ht 1.854 m (6' 1\")   Wt 110 kg (243 lb)   SpO2 97%   BMI 32.06 kg/m²   Physical Exam  Vitals and nursing note reviewed.   Constitutional:       General: He is not in acute distress.     Appearance: Normal appearance. He is well-developed. He is not ill-appearing or toxic-appearing.   HENT:      Head: Normocephalic and atraumatic.      Right Ear: Hearing normal.      Left Ear: Hearing normal.   Eyes:      Conjunctiva/sclera: Conjunctivae normal.   Cardiovascular:      Rate and Rhythm: Normal rate and regular rhythm.      Heart sounds: Normal heart sounds. No murmur heard.   No friction rub. No gallop.    Pulmonary:      Effort: Pulmonary effort is normal.   Musculoskeletal:      Right lower leg: No edema.      Left lower leg: No edema.      Comments: Normal movement in all 4 extremities.  No tenderness to palpation to the calves bilaterally   Skin:     General: Skin is warm and dry.   Neurological:      Mental Status: He is alert and oriented to person, place, and time.      GCS: GCS eye subscore is 4. GCS verbal subscore is 5. GCS motor subscore is 6.      Coordination: Coordination normal.   Psychiatric:         Mood and Affect: Mood normal.       DX CHEST  FINDINGS:  Cardiomediastinal contour is within normal limits.  No focal pulmonary consolidation.  No pleural fluid collection or pneumothorax.  No major bony abnormality is seen.     IMPRESSION:     No acute cardiopulmonary disease.     EKG   Normal sinus rhythm. Rate of 79 no acute ST wave abnormalities at this time. No previous EKG to compare with.    Assessment/Plan:   Assessment    1. Tachycardia  - EKG - Clinic Performed    2. Pleuritic chest pain  - EKG - Clinic Performed  - DX-CHEST-2 VIEWS; " Future    Other orders  - ibuprofen (MOTRIN) 200 MG Tab; Take 400 mg by mouth every 6 hours as needed.  Discussed with patient that we cannot completely rule out cardiac etiology and I am concerned with new onset of his symptoms although he did have normal findings of EKG and chest x-ray today we cannot explain why he is having the acute symptoms in urgent care setting and believe he needs further work-up at this time.  He is agreeable with this plan and feels comfortable driving by private vehicle to the [facility] emergency room.  Differential diagnosis, natural history, supportive care, and indications for immediate follow-up discussed.     Patient agreeable to plan.  [facility] was called and notified and given report.      Please note that this dictation was created using voice recognition software. I have made every reasonable attempt to correct obvious errors, but I expect that there are errors of grammar and possibly content that I did not discover before finalizing the note.    Rafael Layne PA-C

## 2021-06-29 NOTE — DISCHARGE INSTRUCTIONS
If no cardiac risk factors, your EKG is normal troponin is normal, D-dimer is negative, chest x-ray is normal and Covid test is negative.    You are stable for discharge home, referral was placed for cardiology for outpatient evaluation.  Use anti-inflammatory pain medications for discomfort and return if your symptoms change or worsen

## 2021-06-29 NOTE — ED TRIAGE NOTES
Chief Complaint   Patient presents with   • Chest Pain     Gradual onset L sided sharp chest pain starting today.    • Shortness of Breath     Today along with CP.    Pt reports going on a 7 hr drive on 23rd and returning on 27th.   Also had exposure to large group of people, pt is vacinated.

## 2021-06-30 NOTE — ED NOTES
Reviewed discharge instructions and prescription x 1 sent to selected Pharmacy w/ pt, verbalized understanding to information provided including follow up care and return precautions.  Pt denied questions/concerns.  Pt ambulated from ED.

## 2021-07-02 ENCOUNTER — HOSPITAL ENCOUNTER (OUTPATIENT)
Dept: RADIOLOGY | Facility: MEDICAL CENTER | Age: 36
End: 2021-07-02
Attending: INTERNAL MEDICINE
Payer: COMMERCIAL

## 2021-07-02 ENCOUNTER — HOSPITAL ENCOUNTER (OUTPATIENT)
Dept: CARDIOLOGY | Facility: MEDICAL CENTER | Age: 36
End: 2021-07-02
Attending: INTERNAL MEDICINE
Payer: COMMERCIAL

## 2021-07-02 ENCOUNTER — OFFICE VISIT (OUTPATIENT)
Dept: CARDIOLOGY | Facility: MEDICAL CENTER | Age: 36
End: 2021-07-02
Payer: COMMERCIAL

## 2021-07-02 ENCOUNTER — TELEPHONE (OUTPATIENT)
Dept: CARDIOLOGY | Facility: MEDICAL CENTER | Age: 36
End: 2021-07-02

## 2021-07-02 VITALS
WEIGHT: 239.6 LBS | BODY MASS INDEX: 31.75 KG/M2 | HEART RATE: 98 BPM | DIASTOLIC BLOOD PRESSURE: 84 MMHG | HEIGHT: 73 IN | SYSTOLIC BLOOD PRESSURE: 118 MMHG | OXYGEN SATURATION: 93 % | RESPIRATION RATE: 14 BRPM

## 2021-07-02 DIAGNOSIS — R07.2 PRECORDIAL CHEST PAIN: ICD-10-CM

## 2021-07-02 DIAGNOSIS — R06.02 SHORTNESS OF BREATH: ICD-10-CM

## 2021-07-02 DIAGNOSIS — R07.9 CHEST PAIN, UNSPECIFIED TYPE: ICD-10-CM

## 2021-07-02 DIAGNOSIS — R09.02 HYPOXIA: ICD-10-CM

## 2021-07-02 DIAGNOSIS — R00.0 TACHYCARDIA: ICD-10-CM

## 2021-07-02 DIAGNOSIS — R42 DIZZINESS: ICD-10-CM

## 2021-07-02 LAB
EKG IMPRESSION: NORMAL
LV EJECT FRACT MOD 2C 99903: 70.48
LV EJECT FRACT MOD 4C 99902: 57.74
LV EJECT FRACT MOD BP 99901: 66.49

## 2021-07-02 PROCEDURE — 99417 PROLNG OP E/M EACH 15 MIN: CPT | Performed by: INTERNAL MEDICINE

## 2021-07-02 PROCEDURE — 700117 HCHG RX CONTRAST REV CODE 255: Performed by: INTERNAL MEDICINE

## 2021-07-02 PROCEDURE — 93306 TTE W/DOPPLER COMPLETE: CPT

## 2021-07-02 PROCEDURE — 99205 OFFICE O/P NEW HI 60 MIN: CPT | Performed by: INTERNAL MEDICINE

## 2021-07-02 PROCEDURE — 71275 CT ANGIOGRAPHY CHEST: CPT

## 2021-07-02 PROCEDURE — 93000 ELECTROCARDIOGRAM COMPLETE: CPT | Performed by: INTERNAL MEDICINE

## 2021-07-02 PROCEDURE — 93306 TTE W/DOPPLER COMPLETE: CPT | Mod: 26 | Performed by: INTERNAL MEDICINE

## 2021-07-02 RX ORDER — ESOMEPRAZOLE MAGNESIUM 20 MG/1
TABLET, DELAYED RELEASE ORAL
COMMUNITY

## 2021-07-02 RX ORDER — ALBUTEROL SULFATE 90 UG/1
2 AEROSOL, METERED RESPIRATORY (INHALATION) EVERY 4 HOURS PRN
Qty: 1 EACH | Refills: 0 | Status: SHIPPED | OUTPATIENT
Start: 2021-07-02 | End: 2021-07-16

## 2021-07-02 RX ADMIN — IOHEXOL 75 ML: 350 INJECTION, SOLUTION INTRAVENOUS at 17:15

## 2021-07-02 ASSESSMENT — ENCOUNTER SYMPTOMS
LOSS OF CONSCIOUSNESS: 0
SHORTNESS OF BREATH: 1
FALLS: 0
PALPITATIONS: 0
ORTHOPNEA: 0
DEPRESSION: 0
PND: 0
DIZZINESS: 1
ABDOMINAL PAIN: 0

## 2021-07-02 ASSESSMENT — FIBROSIS 4 INDEX: FIB4 SCORE: 0.65

## 2021-07-02 NOTE — PROGRESS NOTES
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 He is a PM&R physician.    Patient was in his usual state of health till 3 days ago when he was finishing up his surgery and noticed that he was not feeling well.  His vitals were checked and he was hypoxic with sats at 87% and heart rate rate in the high 90s to low 100s.  At baseline his saturation is in the high 90s and heart rate is in the 60s.    He had traveled to [place] 5 days ago and given concerns for a pulmonary emboli he presented to the ER where his work-up was unremarkable and he was discharged for outpatient follow-up with cardiology.    Patient continues to feel short of breath and dizzy.  He also reports having left-sided nonradiating pleuritic chest pain that started 3 days ago as well and was constant in nature and spontaneously resolved yesterday.      While exercising he would report worsening dyspnea and dizziness but his chest pain would be constant.    Generally he has been healthy.  Exercises regularly without any cardiac symptoms.  He has no family history of cardiac disease either.    History reviewed. No pertinent past medical history.  History reviewed. No pertinent surgical history.  History reviewed. No pertinent family history.  Social History     Socioeconomic History   • Marital status:      Spouse name: Not on file   • Number of children: Not on file   • Years of education: Not on file   • Highest education level: Professional school degree (e.g., MD, DDS, DVM, SERGIO)   Occupational History   • Not on file   Tobacco Use   • Smoking status: Never Smoker   • Smokeless tobacco: Never Used   Vaping Use   • Vaping Use: Never used   Substance and Sexual Activity   • Alcohol use: Yes   • Drug use: No   • Sexual activity: Not on file   Other Topics Concern   • Not on file  "  Social History Narrative   • Not on file     Social Determinants of Health     Financial Resource Strain: Low Risk    • Difficulty of Paying Living Expenses: Not hard at all   Food Insecurity: No Food Insecurity   • Worried About Running Out of Food in the Last Year: Never true   • Ran Out of Food in the Last Year: Never true   Transportation Needs: No Transportation Needs   • Lack of Transportation (Medical): No   • Lack of Transportation (Non-Medical): No   Physical Activity: Sufficiently Active   • Days of Exercise per Week: 6 days   • Minutes of Exercise per Session: 30 min   Stress: No Stress Concern Present   • Feeling of Stress : Not at all   Social Connections: Unknown   • Frequency of Communication with Friends and Family: More than three times a week   • Frequency of Social Gatherings with Friends and Family: More than three times a week   • Attends Sabianist Services: Patient refused   • Active Member of Clubs or Organizations: Yes   • Attends Club or Organization Meetings: More than 4 times per year   • Marital Status:    Intimate Partner Violence:    • Fear of Current or Ex-Partner:    • Emotionally Abused:    • Physically Abused:    • Sexually Abused:      Allergies   Allergen Reactions   • Ancef [Cefazolin] Anaphylaxis     \"difficulty breathing\"   • Amoxicillin Hives     \"hives\"     Outpatient Encounter Medications as of 7/2/2021   Medication Sig Dispense Refill   • Esomeprazole Magnesium 20 MG Tablet Delayed Response Take  by mouth.     • ibuprofen (MOTRIN) 200 MG Tab Take 400 mg by mouth every 6 hours as needed.     • albuterol 108 (90 Base) MCG/ACT Aero Soln inhalation aerosol Inhale 2 Puffs every 6 hours as needed for Shortness of Breath. 8.5 g 0   • multivitamin (THERAGRAN) Tab Take 1 Tab by mouth every day.     • fluticasone (FLONASE) 50 MCG/ACT nasal spray Spray 1 Spray in nose every day.     • fexofenadine (ALLEGRA ALLERGY) 180 MG tablet Take 180 mg by mouth every day.       No " "facility-administered encounter medications on file as of 7/2/2021.     Review of Systems   Constitutional: Negative for malaise/fatigue.   Respiratory: Positive for shortness of breath.    Cardiovascular: Positive for chest pain. Negative for palpitations, orthopnea, leg swelling and PND.   Gastrointestinal: Negative for abdominal pain.   Musculoskeletal: Negative for falls.   Neurological: Positive for dizziness. Negative for loss of consciousness.   Psychiatric/Behavioral: Negative for depression.   All other systems reviewed and are negative.       Objective:   /84 (BP Location: Left arm, Patient Position: Sitting, BP Cuff Size: Adult)   Pulse 98   Resp 14   Ht 1.854 m (6' 1\")   Wt 109 kg (239 lb 9.6 oz)   SpO2 93%   BMI 31.61 kg/m²     Physical Exam   Constitutional: He is oriented to person, place, and time. He appears well-developed. No distress.   HENT:   Head: Normocephalic and atraumatic.   Eyes: Conjunctivae are normal. No scleral icterus.   Cardiovascular: Normal rate, regular rhythm and normal heart sounds. Exam reveals no gallop and no friction rub.   No murmur heard.  Pulmonary/Chest: Effort normal and breath sounds normal. No respiratory distress. He has no wheezes. He has no rales.   Musculoskeletal:         General: No swelling.      Cervical back: Normal range of motion and neck supple.   Neurological: He is alert and oriented to person, place, and time.   Skin: Skin is warm and dry. He is not diaphoretic.   Psychiatric: His behavior is normal. Judgment and thought content normal.   Nursing note and vitals reviewed.    CBC, Chem-7, D-dimer performed June 2021 were reviewed and showed normal hemoglobin and renal function.  Negative D-dimer.    EKG performed 6/29/2021 was personally reviewed and per my interpretation shows sinus rhythm.    Assessment:     1. Precordial chest pain  EKG   2. Shortness of breath  EC-ECHOCARDIOGRAM COMPLETE W/O CONT    CT-CTA CHEST PULMONARY ARTERY W/ RECONS "   3. Hypoxia  EC-ECHOCARDIOGRAM COMPLETE W/O CONT    CT-CTA CHEST PULMONARY ARTERY W/ RECONS   4. Dizziness     5. Chest pain, unspecified type  EC-ECHOCARDIOGRAM COMPLETE W/O CONT    CT-CTA CHEST PULMONARY ARTERY W/ RECONS       Medical Decision Making:  Today's Assessment / Status / Plan:     Patient is presenting with pleuritic chest discomfort which has since resolved but with persistent dyspnea and dizziness along with hypoxia and borderline elevated heart rates in the setting of recent travel.  His pretest probability for pulmonary embolism is high and therefore a negative D-dimer does not definitively rule out a PE.  Given high risk for decompensation, he will be referred for a STAT CTA today for further evaluation.  ER precautions have been discussed with the patient as well.    He will also be referred for a STAT echocardiogram.  We will also request an agitated saline contrast study to evaluate for right-to-left shunt.    If his aforementioned work-up is unremarkable, he will need a pulmonary referral along with PFTs.  Would recommend thyroid testing as well.    Return to clinic in 1 month depending on test results.    Thank you for allowing me to participate in the care of this patient. Please do not hesitate to contact me with any questions.    Whitney Viramontes MD, [facility]  Cardiologist  [facility]    PLEASE NOTE: This dictation was created using voice recognition software.       ADDENDUM at 6:45 PM:  Echocardiogram reviewed and showed normal LV function.  No major valvular pathology.  No obvious right-to-left shunt either.  CTA ruled out pulmonary embolism.    I discussed the case with pulmonologist, Dr. Garcia.  He has kindly accepted to see the patient in clinic.  Pulmonary function testing will be performed at his clinic visit.  In the meantime ABGs have been ordered to evaluate for an A-a gradient.  Per Dr. Garcia, no obvious pulmonary pathology noted on the CT scan.   Possibly mild air trapping.  He recommended trying albuterol as needed.  Patient had reported trying albuterol at home however this prescription was 2 or 3 years old.  I then spoke with the patient who confirmed that his albuterol prescription had in fact .  A new prescription of albuterol has been sent.  Albuterol use has been reviewed with the patient. Overall plan reviewed with him and he is agreeable. ER precautions have been discussed.     Follow-up as needed in cardiology clinic.    80 minutes were spent on the patient's encounter today over the course of the day which includes initial assessment, review of applicable studies and discussion with pulmonology and patient as noted above.    Whitney Viramontes MD, [facility]  Cardiologist  [facility]

## 2021-07-06 ENCOUNTER — TELEPHONE (OUTPATIENT)
Dept: SLEEP MEDICINE | Facility: MEDICAL CENTER | Age: 36
End: 2021-07-06

## 2021-07-06 ENCOUNTER — PATIENT MESSAGE (OUTPATIENT)
Dept: SLEEP MEDICINE | Facility: MEDICAL CENTER | Age: 36
End: 2021-07-06

## 2021-07-06 NOTE — TELEPHONE ENCOUNTER
----- Message from Mauro Garcia M.D. sent at 7/2/2021  6:29 PM PDT -----  Regarding: new pt  Can we add this patient to my schedule in July?  Needs a PFT prior to appt. Ok to overbook

## 2021-07-07 ENCOUNTER — TELEPHONE (OUTPATIENT)
Dept: CARDIOLOGY | Facility: MEDICAL CENTER | Age: 36
End: 2021-07-07

## 2021-07-07 NOTE — TELEPHONE ENCOUNTER
ISAMAR Almanza with Dr Garcia from Pulmonary is wanting to know if AA can send a referral for patient to him. Any questions call ext [phone] or call [phone] and ask for front office pulm.  Please advise or send referral.     Thank you,    Trena MAS

## 2021-07-07 NOTE — TELEPHONE ENCOUNTER
Pt called and spoke with front office. Pt scheduled for PFT and appt on 07/16/21.    Called Dr Whitney Viramontes (cardiology) and lm. Requesting referral.

## 2021-07-09 DIAGNOSIS — R07.2 PRECORDIAL CHEST PAIN: ICD-10-CM

## 2021-07-09 DIAGNOSIS — R09.02 HYPOXIA: ICD-10-CM

## 2021-07-09 DIAGNOSIS — R06.02 SHORTNESS OF BREATH: ICD-10-CM

## 2021-07-16 ENCOUNTER — NON-PROVIDER VISIT (OUTPATIENT)
Dept: SLEEP MEDICINE | Facility: MEDICAL CENTER | Age: 36
End: 2021-07-16
Attending: INTERNAL MEDICINE
Payer: COMMERCIAL

## 2021-07-16 ENCOUNTER — OFFICE VISIT (OUTPATIENT)
Dept: SLEEP MEDICINE | Facility: MEDICAL CENTER | Age: 36
End: 2021-07-16
Payer: COMMERCIAL

## 2021-07-16 VITALS
HEART RATE: 70 BPM | HEIGHT: 73 IN | SYSTOLIC BLOOD PRESSURE: 121 MMHG | WEIGHT: 235 LBS | OXYGEN SATURATION: 98 % | DIASTOLIC BLOOD PRESSURE: 76 MMHG | BODY MASS INDEX: 31.14 KG/M2

## 2021-07-16 VITALS — BODY MASS INDEX: 31.14 KG/M2 | WEIGHT: 235 LBS | HEIGHT: 73 IN

## 2021-07-16 DIAGNOSIS — J45.20 MILD INTERMITTENT ASTHMA WITHOUT COMPLICATION: ICD-10-CM

## 2021-07-16 PROCEDURE — 94729 DIFFUSING CAPACITY: CPT | Performed by: INTERNAL MEDICINE

## 2021-07-16 PROCEDURE — 94726 PLETHYSMOGRAPHY LUNG VOLUMES: CPT | Performed by: INTERNAL MEDICINE

## 2021-07-16 PROCEDURE — 94060 EVALUATION OF WHEEZING: CPT | Performed by: INTERNAL MEDICINE

## 2021-07-16 PROCEDURE — 99204 OFFICE O/P NEW MOD 45 MIN: CPT | Mod: 25 | Performed by: INTERNAL MEDICINE

## 2021-07-16 ASSESSMENT — PULMONARY FUNCTION TESTS
FVC_LLN: 4.91
FVC_PERCENT_PREDICTED: 94
FEV1_LLN: 3.95
FEV1/FVC_PERCENT_CHANGE: 300
FEV1/FVC_PERCENT_PREDICTED: 101
FEV1_PERCENT_CHANGE: 3
FEV1/FVC_PERCENT_LLN: 67
FEV1_PERCENT_PREDICTED: 94
FEV1: 4.45
FEV1/FVC_PREDICTED: 81
FVC_PREDICTED: 5.88
FEV1/FVC: 81.59
FEV1_PERCENT_CHANGE: 1
FEV1/FVC_PERCENT_CHANGE: 1
FEV1/FVC: 80
FEV1/FVC: 82
FEV1_PERCENT_PREDICTED: 97
FVC_PERCENT_PREDICTED: 96
FEV1/FVC_PERCENT_PREDICTED: 101
FEV1/FVC_PERCENT_PREDICTED: 100
FEV1_PREDICTED: 4.73
FEV1: 4.61
FEV1/FVC_PERCENT_PREDICTED: 99
FEV1/FVC_PERCENT_PREDICTED: 80
FEV1/FVC: 80
FVC: 5.65
FVC: 5.55

## 2021-07-16 ASSESSMENT — FIBROSIS 4 INDEX
FIB4 SCORE: 0.65
FIB4 SCORE: 0.65

## 2021-07-16 ASSESSMENT — ENCOUNTER SYMPTOMS
EYE DISCHARGE: 0
EYE PAIN: 0
SINUS PAIN: 0
WHEEZING: 0
VOMITING: 0
SENSORY CHANGE: 0
NAUSEA: 0
WEIGHT LOSS: 0
ABDOMINAL PAIN: 0
STRIDOR: 0
SHORTNESS OF BREATH: 0
DIZZINESS: 0
PSYCHIATRIC NEGATIVE: 1
DIARRHEA: 0
FOCAL WEAKNESS: 0
LOSS OF CONSCIOUSNESS: 0
FEVER: 0
ORTHOPNEA: 0
COUGH: 0
HEADACHES: 0
CHILLS: 0
MYALGIAS: 0
SORE THROAT: 0
SPUTUM PRODUCTION: 0

## 2021-07-16 NOTE — PROCEDURES
Tech: Azeb Stover, RRT, CPFT  Tech notes: Good patient effort & cooperation.  The results of this test meet the ATS/ERS standards for acceptability & reproducibility.  Test was performed on the [employer] Body Plethysmograph-Elite DX system.  Predicted values were GLI-2012 for spirometry, GLI- 2017 for DLCO, ITS for Lung Volumes.  The DLCO was uncorrected for Hgb.  A bronchodilator of Ventolin HFA -2puffs via spacer administered.  DLCO performed during dilation period.    Interpretation:  1.  There is no significant obstructive ventilatory defect on spirometry.  There is no significant response to bronchodilators.  Maximum voluntary ventilation is normal.  2.  Expiratory reserve volume is low likely attributable to patient's reported BMI of 31.0.  Otherwise lung volumes are normal.  3.  Normal diffusion capacity.  4.  Flow volume loop consistent with the above interpretation.  5.  No prior PFTs for comparison.  __________  Mauro Garcia MD  Pulmonary and Critical Care Medicine  [facility]

## 2021-07-16 NOTE — PROGRESS NOTES
Pulmonary Clinic- Initial Consult    Date of Service: 7/16/2021    Referring Physician: Whitney Viramontes M.D.    Reason for Consult: Hypoxia and shortness of breath    Chief Complaint:   Chief Complaint   Patient presents with   • Establish Care     Referred by DR Whitney Wall for Hypoxia, SOB, Precordial chest pain   • Results     PFT 07/16/21, Labs 06/29/21, CT-CTA Chest 07/02/21, Echo 07/02/21, CXR 06/29/21     HPI:   Bassem Wilson is a very pleasant 35 y.o. male never smoker followed by Whitney Viramontes M.D. and is referred to the pulmonary clinic for hypoxia and shortness of breath. He has not been seen by pulmonary in the past and has had no prior PFTs.    Bassem has a history of childhood asthma with frequent exacerbations [place]  After leaving the [place] as an adult, his asthma has been significantly better controlled, with an exacerbation once few years.  Bassem was in his usual state of health until early July was at work and was feeling dizziness, dyspnea, and left-sided pleuritic chest pain. 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 He had just returned from a trip to [place] 5 days prior where he was feeling allergic/URI symptoms.       He was seen in the cardiology clinic for the symptoms and CT angiogram was performed, showing no PE.  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  An echocardiogram was also performed, LVEF 60%, normal diastolic function, negative bubble study. COVID/Flu/RSV negative and D-dimer wnl. ECG, CBC and CMP unremarkable as well.  He was prescribed an albuterol inhaler with significant improvement in his symptoms which is essentially resolved at this point.  He is back to exercising vigorously with no limitations and has been checking his saturations at home which have consistently been in the 90s.    Exacerbations within the past 12 months:  1    History reviewed. Seasonal allergies on allegra and flonase, GERD on PPI, childhood asthma    History reviewed. No pertinent surgical history.    Social History     Socioeconomic History   • Marital status:      Spouse name: Not on file   • Number of children: Not on file   • Years of education: Not on file   • Highest education level: Professional school degree (e.g., MD, DDS, DVM, SERGIO)   Occupational History   • Not on file   Tobacco Use   • Smoking status: Never Smoker   • Smokeless tobacco: Never Used   Vaping Use   • Vaping Use: Never used   Substance and Sexual Activity   • Alcohol use: Yes   • Drug use: No   • Sexual activity: Not on file   Other Topics Concern   • Not on file   Social History Narrative   • Not on file     Social Determinants of Health     Financial Resource Strain: Low Risk    • Difficulty of Paying Living Expenses: Not hard at all   Food Insecurity: No Food Insecurity   • Worried About Running Out of Food in the Last Year: Never true   • Ran Out of Food in the Last Year: Never true   Transportation Needs: No Transportation Needs   • Lack of Transportation (Medical): No   • Lack of Transportation (Non-Medical): No   Physical Activity: Sufficiently Active   • Days of Exercise per Week: 6 days   • Minutes of Exercise per Session: 30 min   Stress: No Stress Concern Present   • Feeling of Stress : Not at all   Social Connections: Unknown   • Frequency of Communication with Friends and Family: More than three times a week   • Frequency of Social Gatherings with Friends and Family: More than three times a week   • Attends Voodoo Services: Patient refused   • Active Member of Clubs or Organizations: Yes   • Attends Club or Organization Meetings: More than 4 times per year   • Marital Status:    Intimate Partner Violence:    • Fear of Current or Ex-Partner:    • Emotionally Abused:    • Physically Abused:    • Sexually Abused:         History reviewed. No pertinent family  "history.    Current Outpatient Medications on File Prior to Visit   Medication Sig Dispense Refill   • Esomeprazole Magnesium 20 MG Tablet Delayed Response Take  by mouth.     • albuterol 108 (90 Base) MCG/ACT Aero Soln inhalation aerosol Inhale 2 Puffs every 6 hours as needed for Shortness of Breath. 8.5 g 0   • multivitamin (THERAGRAN) Tab Take 1 Tab by mouth every day.     • fluticasone (FLONASE) 50 MCG/ACT nasal spray Spray 1 Spray in nose every day.     • fexofenadine (ALLEGRA ALLERGY) 180 MG tablet Take 180 mg by mouth every day.       No current facility-administered medications on file prior to visit.     Allergies: Ancef [cefazolin] and Amoxicillin    ROS:   Review of Systems   Constitutional: Negative for chills, fever and weight loss.   HENT: Negative for congestion, sinus pain and sore throat.    Eyes: Negative for pain and discharge.   Respiratory: Negative for cough, sputum production, shortness of breath, wheezing and stridor.    Cardiovascular: Negative for chest pain, orthopnea and leg swelling.   Gastrointestinal: Negative for abdominal pain, diarrhea, nausea and vomiting.   Genitourinary: Negative for dysuria, frequency and urgency.   Musculoskeletal: Negative for myalgias.   Skin: Negative for rash.   Neurological: Negative for dizziness, sensory change, focal weakness, loss of consciousness and headaches.   Psychiatric/Behavioral: Negative.    All other systems reviewed and are negative.    Vitals:  /76 (BP Location: Right arm, Patient Position: Sitting, BP Cuff Size: Large adult)   Pulse 70   Ht 1.854 m (6' 1\")   Wt 107 kg (235 lb)   SpO2 98%     Physical Exam:  Physical Exam  Vitals and nursing note reviewed.   Constitutional:       General: He is not in acute distress.     Appearance: Normal appearance. He is well-developed. He is not diaphoretic.      Comments: Very pleasant   HENT:      Nose: Nose normal.      Mouth/Throat:      Pharynx: No oropharyngeal exudate.   Eyes:      " General: No scleral icterus.        Right eye: No discharge.         Left eye: No discharge.      Conjunctiva/sclera: Conjunctivae normal.      Pupils: Pupils are equal, round, and reactive to light.   Neck:      Thyroid: No thyromegaly.      Vascular: No JVD.      Trachea: No tracheal deviation.   Cardiovascular:      Rate and Rhythm: Normal rate and regular rhythm.      Heart sounds: Normal heart sounds. No murmur heard.     Pulmonary:      Effort: Pulmonary effort is normal. No respiratory distress.      Breath sounds: Normal breath sounds. No stridor. No wheezing or rales.   Abdominal:      General: There is no distension.      Palpations: Abdomen is soft.      Tenderness: There is no abdominal tenderness. There is no guarding.   Musculoskeletal:         General: No tenderness. Normal range of motion.      Cervical back: Neck supple.   Lymphadenopathy:      Cervical: No cervical adenopathy.   Skin:     General: Skin is warm and dry.      Capillary Refill: Capillary refill takes less than 2 seconds.      Coloration: Skin is not pale.      Findings: No erythema.   Neurological:      Mental Status: He is alert and oriented to person, place, and time.      Sensory: No sensory deficit.      Motor: No abnormal muscle tone.      Coordination: Coordination normal.      Deep Tendon Reflexes: Reflexes normal.   Psychiatric:         Behavior: Behavior normal.         Thought Content: Thought content normal.         Judgment: Judgment normal.       Laboratory Data:    PFTs as reviewed by me personally show:  PFTs today in clinic personally reviewed showing normal spirometry, no significant bronchodilator response, reduced ERV, otherwise normal lung volumes and diffusion capacity.    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    Assessment/Plan:    Problem List Items Addressed This Visit     Mild intermittent asthma      [place] which was difficult to control, grew out of as an adult.  Recent exacerbation following possible URI/environmental trigger while camping in [place], subsequently with mild hypoxia, dizziness and pleurisy.  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 If develops symptoms again will plan for Medrol dose pack, consideration of maintenance ICS/LABA              Return if symptoms worsen or fail to improve.     This note was generated using voice recognition software which has a chance of producing errors of grammar and possibly content.  I have made every reasonable attempt to find and correct any obvious errors, but it should be expected that some may not be found prior to finalization of this note.     Time spent in record review prior to patient arrival, reviewing results, and in face-to-face encounter totaled 45 min, excluding any procedures if performed.  __________  Mauro Garcia MD  Pulmonary and Critical Care Medicine  [facility]

## 2021-07-17 NOTE — ASSESSMENT & PLAN NOTE
Child[place] asthma [place] which was difficult to control, grew out of as an adult.  Recent exacerbation following possible URI/environmental trigger while camping in [facility], subsequently with mild hypoxia, dizziness and pleurisy.  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 If develops symptoms again will plan for Medrol dose pack, consideration of maintenance ICS/LABA

## 2021-09-28 ENCOUNTER — IMMUNIZATION (OUTPATIENT)
Dept: OCCUPATIONAL MEDICINE | Facility: CLINIC | Age: 36
End: 2021-09-28

## 2021-09-28 DIAGNOSIS — Z23 NEED FOR VACCINATION: Primary | ICD-10-CM

## 2021-09-28 PROCEDURE — 90686 IIV4 VACC NO PRSV 0.5 ML IM: CPT | Performed by: PREVENTIVE MEDICINE

## 2022-08-18 SDOH — ECONOMIC STABILITY: HOUSING INSECURITY
IN THE LAST 12 MONTHS, WAS THERE A TIME WHEN YOU DID NOT HAVE A STEADY PLACE TO SLEEP OR SLEPT IN A SHELTER (INCLUDING NOW)?: NO

## 2022-08-18 SDOH — ECONOMIC STABILITY: HOUSING INSECURITY: IN THE LAST 12 MONTHS, HOW MANY PLACES HAVE YOU LIVED?: 1

## 2022-08-18 SDOH — ECONOMIC STABILITY: INCOME INSECURITY: IN THE LAST 12 MONTHS, WAS THERE A TIME WHEN YOU WERE NOT ABLE TO PAY THE MORTGAGE OR RENT ON TIME?: NO

## 2022-08-18 SDOH — ECONOMIC STABILITY: TRANSPORTATION INSECURITY
IN THE PAST 12 MONTHS, HAS LACK OF RELIABLE TRANSPORTATION KEPT YOU FROM MEDICAL APPOINTMENTS, MEETINGS, WORK OR FROM GETTING THINGS NEEDED FOR DAILY LIVING?: NO

## 2022-08-18 SDOH — HEALTH STABILITY: PHYSICAL HEALTH: ON AVERAGE, HOW MANY DAYS PER WEEK DO YOU ENGAGE IN MODERATE TO STRENUOUS EXERCISE (LIKE A BRISK WALK)?: 6 DAYS

## 2022-08-18 SDOH — ECONOMIC STABILITY: INCOME INSECURITY: HOW HARD IS IT FOR YOU TO PAY FOR THE VERY BASICS LIKE FOOD, HOUSING, MEDICAL CARE, AND HEATING?: NOT HARD AT ALL

## 2022-08-18 SDOH — ECONOMIC STABILITY: FOOD INSECURITY: WITHIN THE PAST 12 MONTHS, THE FOOD YOU BOUGHT JUST DIDN'T LAST AND YOU DIDN'T HAVE MONEY TO GET MORE.: NEVER TRUE

## 2022-08-18 SDOH — ECONOMIC STABILITY: FOOD INSECURITY: WITHIN THE PAST 12 MONTHS, YOU WORRIED THAT YOUR FOOD WOULD RUN OUT BEFORE YOU GOT MONEY TO BUY MORE.: NEVER TRUE

## 2022-08-18 SDOH — ECONOMIC STABILITY: TRANSPORTATION INSECURITY
IN THE PAST 12 MONTHS, HAS THE LACK OF TRANSPORTATION KEPT YOU FROM MEDICAL APPOINTMENTS OR FROM GETTING MEDICATIONS?: NO

## 2022-08-18 SDOH — HEALTH STABILITY: PHYSICAL HEALTH: ON AVERAGE, HOW MANY MINUTES DO YOU ENGAGE IN EXERCISE AT THIS LEVEL?: 40 MIN

## 2022-08-18 SDOH — ECONOMIC STABILITY: TRANSPORTATION INSECURITY
IN THE PAST 12 MONTHS, HAS LACK OF TRANSPORTATION KEPT YOU FROM MEETINGS, WORK, OR FROM GETTING THINGS NEEDED FOR DAILY LIVING?: NO

## 2022-08-18 SDOH — HEALTH STABILITY: MENTAL HEALTH
STRESS IS WHEN SOMEONE FEELS TENSE, NERVOUS, ANXIOUS, OR CAN'T SLEEP AT NIGHT BECAUSE THEIR MIND IS TROUBLED. HOW STRESSED ARE YOU?: NOT AT ALL

## 2022-08-18 ASSESSMENT — SOCIAL DETERMINANTS OF HEALTH (SDOH)
HOW OFTEN DO YOU HAVE SIX OR MORE DRINKS ON ONE OCCASION: NEVER
IN A TYPICAL WEEK, HOW MANY TIMES DO YOU TALK ON THE PHONE WITH FAMILY, FRIENDS, OR NEIGHBORS?: MORE THAN THREE TIMES A WEEK
HOW OFTEN DO YOU ATTENT MEETINGS OF THE CLUB OR ORGANIZATION YOU BELONG TO?: MORE THAN 4 TIMES PER YEAR
WITHIN THE PAST 12 MONTHS, YOU WORRIED THAT YOUR FOOD WOULD RUN OUT BEFORE YOU GOT THE MONEY TO BUY MORE: NEVER TRUE
HOW OFTEN DO YOU GET TOGETHER WITH FRIENDS OR RELATIVES?: MORE THAN THREE TIMES A WEEK
DO YOU BELONG TO ANY CLUBS OR ORGANIZATIONS SUCH AS CHURCH GROUPS UNIONS, FRATERNAL OR ATHLETIC GROUPS, OR SCHOOL GROUPS?: YES
HOW MANY DRINKS CONTAINING ALCOHOL DO YOU HAVE ON A TYPICAL DAY WHEN YOU ARE DRINKING: 1 OR 2
HOW OFTEN DO YOU GET TOGETHER WITH FRIENDS OR RELATIVES?: MORE THAN THREE TIMES A WEEK
IN A TYPICAL WEEK, HOW MANY TIMES DO YOU TALK ON THE PHONE WITH FAMILY, FRIENDS, OR NEIGHBORS?: MORE THAN THREE TIMES A WEEK
HOW HARD IS IT FOR YOU TO PAY FOR THE VERY BASICS LIKE FOOD, HOUSING, MEDICAL CARE, AND HEATING?: NOT HARD AT ALL
HOW OFTEN DO YOU ATTENT MEETINGS OF THE CLUB OR ORGANIZATION YOU BELONG TO?: MORE THAN 4 TIMES PER YEAR
HOW OFTEN DO YOU ATTEND CHURCH OR RELIGIOUS SERVICES?: NEVER
HOW OFTEN DO YOU HAVE A DRINK CONTAINING ALCOHOL: MONTHLY OR LESS
HOW OFTEN DO YOU ATTEND CHURCH OR RELIGIOUS SERVICES?: NEVER
DO YOU BELONG TO ANY CLUBS OR ORGANIZATIONS SUCH AS CHURCH GROUPS UNIONS, FRATERNAL OR ATHLETIC GROUPS, OR SCHOOL GROUPS?: YES

## 2022-08-18 ASSESSMENT — LIFESTYLE VARIABLES
HOW OFTEN DO YOU HAVE SIX OR MORE DRINKS ON ONE OCCASION: NEVER
SKIP TO QUESTIONS 9-10: 1
HOW OFTEN DO YOU HAVE A DRINK CONTAINING ALCOHOL: MONTHLY OR LESS
AUDIT-C TOTAL SCORE: 1
HOW MANY STANDARD DRINKS CONTAINING ALCOHOL DO YOU HAVE ON A TYPICAL DAY: 1 OR 2

## 2022-08-19 ENCOUNTER — OFFICE VISIT (OUTPATIENT)
Dept: MEDICAL GROUP | Age: 37
End: 2022-08-19
Payer: COMMERCIAL

## 2022-08-19 VITALS
BODY MASS INDEX: 31.17 KG/M2 | HEART RATE: 96 BPM | TEMPERATURE: 98.4 F | RESPIRATION RATE: 16 BRPM | WEIGHT: 235.2 LBS | OXYGEN SATURATION: 95 % | HEIGHT: 73 IN

## 2022-08-19 DIAGNOSIS — E66.9 OBESITY (BMI 30-39.9): ICD-10-CM

## 2022-08-19 DIAGNOSIS — G89.29 CHRONIC PAIN OF RIGHT ANKLE: ICD-10-CM

## 2022-08-19 DIAGNOSIS — G89.29 CHRONIC BILATERAL LOW BACK PAIN WITH SCIATICA, SCIATICA LATERALITY UNSPECIFIED: ICD-10-CM

## 2022-08-19 DIAGNOSIS — N40.0 BENIGN PROSTATIC HYPERPLASIA WITHOUT LOWER URINARY TRACT SYMPTOMS: ICD-10-CM

## 2022-08-19 DIAGNOSIS — M25.511 CHRONIC RIGHT SHOULDER PAIN: ICD-10-CM

## 2022-08-19 DIAGNOSIS — F51.01 PRIMARY INSOMNIA: ICD-10-CM

## 2022-08-19 DIAGNOSIS — E29.1 HYPOGONADISM IN MALE: ICD-10-CM

## 2022-08-19 DIAGNOSIS — E78.00 PURE HYPERCHOLESTEROLEMIA: ICD-10-CM

## 2022-08-19 DIAGNOSIS — E55.9 VITAMIN D DEFICIENCY: ICD-10-CM

## 2022-08-19 DIAGNOSIS — Z23 NEED FOR VACCINATION: ICD-10-CM

## 2022-08-19 DIAGNOSIS — M54.40 CHRONIC BILATERAL LOW BACK PAIN WITH SCIATICA, SCIATICA LATERALITY UNSPECIFIED: ICD-10-CM

## 2022-08-19 DIAGNOSIS — M25.571 CHRONIC PAIN OF RIGHT ANKLE: ICD-10-CM

## 2022-08-19 DIAGNOSIS — Z00.00 ENCOUNTER FOR MEDICAL EXAMINATION TO ESTABLISH CARE: ICD-10-CM

## 2022-08-19 DIAGNOSIS — F90.2 ATTENTION DEFICIT HYPERACTIVITY DISORDER (ADHD), COMBINED TYPE: ICD-10-CM

## 2022-08-19 DIAGNOSIS — G89.29 CHRONIC RIGHT SHOULDER PAIN: ICD-10-CM

## 2022-08-19 PROBLEM — M54.50 LUMBAR PAIN: Status: ACTIVE | Noted: 2017-03-29

## 2022-08-19 PROBLEM — M25.551 PAIN IN JOINT OF RIGHT HIP: Status: ACTIVE | Noted: 2017-05-17

## 2022-08-19 PROCEDURE — 90471 IMMUNIZATION ADMIN: CPT | Performed by: FAMILY MEDICINE

## 2022-08-19 PROCEDURE — 99204 OFFICE O/P NEW MOD 45 MIN: CPT | Mod: 25 | Performed by: FAMILY MEDICINE

## 2022-08-19 PROCEDURE — 90677 PCV20 VACCINE IM: CPT | Performed by: FAMILY MEDICINE

## 2022-08-19 RX ORDER — DEXTROAMPHETAMINE SACCHARATE, AMPHETAMINE ASPARTATE, DEXTROAMPHETAMINE SULFATE AND AMPHETAMINE SULFATE 5; 5; 5; 5 MG/1; MG/1; MG/1; MG/1
TABLET ORAL
COMMUNITY
End: 2022-08-19

## 2022-08-19 RX ORDER — FEXOFENADINE HCL 180 MG/1
180 TABLET ORAL DAILY
Qty: 30 TABLET | Status: CANCELLED | OUTPATIENT
Start: 2022-08-19

## 2022-08-19 RX ORDER — DEXTROAMPHETAMINE SACCHARATE, AMPHETAMINE ASPARTATE, DEXTROAMPHETAMINE SULFATE AND AMPHETAMINE SULFATE 1.25; 1.25; 1.25; 1.25 MG/1; MG/1; MG/1; MG/1
5 TABLET ORAL 3 TIMES DAILY
COMMUNITY
End: 2022-08-19 | Stop reason: SDUPTHER

## 2022-08-19 RX ORDER — DEXTROAMPHETAMINE SACCHARATE, AMPHETAMINE ASPARTATE, DEXTROAMPHETAMINE SULFATE AND AMPHETAMINE SULFATE 2.5; 2.5; 2.5; 2.5 MG/1; MG/1; MG/1; MG/1
10 TABLET ORAL 2 TIMES DAILY
Qty: 60 TABLET | Refills: 0 | Status: SHIPPED | OUTPATIENT
Start: 2022-08-19 | End: 2022-09-18

## 2022-08-19 RX ORDER — DEXTROAMPHETAMINE SACCHARATE, AMPHETAMINE ASPARTATE, DEXTROAMPHETAMINE SULFATE AND AMPHETAMINE SULFATE 1.25; 1.25; 1.25; 1.25 MG/1; MG/1; MG/1; MG/1
5 TABLET ORAL 3 TIMES DAILY
Qty: 60 TABLET | Status: CANCELLED | OUTPATIENT
Start: 2022-08-19

## 2022-08-19 RX ORDER — ZOLPIDEM TARTRATE 10 MG/1
TABLET ORAL
COMMUNITY
End: 2022-11-01 | Stop reason: SDUPTHER

## 2022-08-19 RX ORDER — DEXTROAMPHETAMINE SACCHARATE, AMPHETAMINE ASPARTATE, DEXTROAMPHETAMINE SULFATE AND AMPHETAMINE SULFATE 1.25; 1.25; 1.25; 1.25 MG/1; MG/1; MG/1; MG/1
5 TABLET ORAL 3 TIMES DAILY
Qty: 90 TABLET | Refills: 0 | Status: SHIPPED | OUTPATIENT
Start: 2022-10-19 | End: 2022-11-18

## 2022-08-19 RX ORDER — DEXTROAMPHETAMINE SACCHARATE, AMPHETAMINE ASPARTATE, DEXTROAMPHETAMINE SULFATE AND AMPHETAMINE SULFATE 2.5; 2.5; 2.5; 2.5 MG/1; MG/1; MG/1; MG/1
10 TABLET ORAL 2 TIMES DAILY
COMMUNITY
End: 2022-08-19 | Stop reason: SDUPTHER

## 2022-08-19 RX ORDER — FLUTICASONE PROPIONATE 50 MCG
1 SPRAY, SUSPENSION (ML) NASAL DAILY
Qty: 16 G | Refills: 7 | Status: SHIPPED | OUTPATIENT
Start: 2022-08-19

## 2022-08-19 RX ORDER — DEXTROAMPHETAMINE SACCHARATE, AMPHETAMINE ASPARTATE, DEXTROAMPHETAMINE SULFATE AND AMPHETAMINE SULFATE 2.5; 2.5; 2.5; 2.5 MG/1; MG/1; MG/1; MG/1
10 TABLET ORAL 2 TIMES DAILY
Qty: 60 TABLET | Refills: 0 | Status: SHIPPED | OUTPATIENT
Start: 2022-09-19 | End: 2022-10-19

## 2022-08-19 RX ORDER — DEXTROAMPHETAMINE SACCHARATE, AMPHETAMINE ASPARTATE, DEXTROAMPHETAMINE SULFATE AND AMPHETAMINE SULFATE 1.25; 1.25; 1.25; 1.25 MG/1; MG/1; MG/1; MG/1
5 TABLET ORAL 3 TIMES DAILY
Qty: 90 TABLET | Refills: 0 | Status: SHIPPED | OUTPATIENT
Start: 2022-08-19 | End: 2022-09-18

## 2022-08-19 RX ORDER — FEXOFENADINE HCL 180 MG/1
180 TABLET ORAL DAILY
Qty: 90 TABLET | Refills: 0 | Status: SHIPPED | OUTPATIENT
Start: 2022-08-19

## 2022-08-19 RX ORDER — DEXTROAMPHETAMINE SACCHARATE, AMPHETAMINE ASPARTATE, DEXTROAMPHETAMINE SULFATE AND AMPHETAMINE SULFATE 2.5; 2.5; 2.5; 2.5 MG/1; MG/1; MG/1; MG/1
10 TABLET ORAL 2 TIMES DAILY
Qty: 180 TABLET | Refills: 0 | Status: CANCELLED | OUTPATIENT
Start: 2022-08-19 | End: 2022-11-17

## 2022-08-19 RX ORDER — ALBUTEROL SULFATE 90 UG/1
2 AEROSOL, METERED RESPIRATORY (INHALATION) EVERY 6 HOURS PRN
Qty: 8.5 G | Refills: 0 | Status: SHIPPED | OUTPATIENT
Start: 2022-08-19 | End: 2024-03-03 | Stop reason: SDUPTHER

## 2022-08-19 RX ORDER — TADALAFIL 5 MG/1
TABLET ORAL
Qty: 90 TABLET | Refills: 0 | Status: SHIPPED | OUTPATIENT
Start: 2022-08-19 | End: 2023-02-24

## 2022-08-19 RX ORDER — TESTOSTERONE CYPIONATE 200 MG/ML
200 INJECTION, SOLUTION INTRAMUSCULAR ONCE
Status: COMPLETED | OUTPATIENT
Start: 2022-08-19 | End: 2022-08-19

## 2022-08-19 RX ORDER — DEXTROAMPHETAMINE SACCHARATE, AMPHETAMINE ASPARTATE, DEXTROAMPHETAMINE SULFATE AND AMPHETAMINE SULFATE 1.25; 1.25; 1.25; 1.25 MG/1; MG/1; MG/1; MG/1
5 TABLET ORAL 3 TIMES DAILY
Qty: 90 TABLET | Refills: 0 | Status: SHIPPED | OUTPATIENT
Start: 2022-09-19 | End: 2022-10-19

## 2022-08-19 RX ADMIN — TESTOSTERONE CYPIONATE 200 MG: 200 INJECTION, SOLUTION INTRAMUSCULAR at 07:38

## 2022-08-19 ASSESSMENT — PATIENT HEALTH QUESTIONNAIRE - PHQ9: CLINICAL INTERPRETATION OF PHQ2 SCORE: 0

## 2022-08-19 ASSESSMENT — FIBROSIS 4 INDEX: FIB4 SCORE: 0.68

## 2022-08-19 NOTE — PROGRESS NOTES
This medical record contains text that has been entered with the assistance of computer voice recognition and dictation software.  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  He presents to clinic to establish care.  He has a significant past medical history of low testosterone and ADHD insomnia.    Today the patient denied any fevers, chills, headaches, nausea, vomiting, changes in vision, shortness of breath, back pain, chest pain, nausea or vomiting, change in taste or smell, new rashes, joint pain, blood in stool dark tarry stool.      Past medical history see above and below    Family History of Cancer---none    Family History of early CAD (female for the age of 65, or a male before the age of 55 )---none      Social History        Occupation----Physician    Exercise---5-6 days per wk      Pets---1 dog named Nitesh Cadena    Favorite sports team---[employer]      2. Attention deficit hyperactivity disorder (ADHD), combined type  The patient has been on Adderall for years she has been taking 10 mg twice daily and then 5 mg 3 times daily as needed.  There have not been any early refills no misuse of medication.  He denies any anxiety no chest pain no palpitations overall has been tolerating this very well.    3. Hypogonadism in male  The patient has been on testosterone in the past at an outside state.  He states that he would like to get back on it he has noticed decreased energy, decreased libido which is impacting his ability to exercise.  He did not have any adverse events in the past he  states.    4. Primary insomnia  Ambien 10 mg p.o. nightly    Patient has been taking this medication for years without any adverse events.        Current medicines (including changes today)  Current Outpatient Medications   Medication Sig Dispense Refill    zolpidem (AMBIEN) 10 MG Tab       amphetamine-dextroamphetamine (ADDERALL) 10 MG Tab Take 1 Tablet by mouth 2 times a day for 30 days. 60 Tablet 0    [START ON 9/19/2022] amphetamine-dextroamphetamine (ADDERALL) 10 MG Tab Take 1 Tablet by mouth 2 times a day for 30 days. 60 Tablet 0    amphetamine-dextroamphetamine (ADDERALL) 10 MG Tab Take 1 Tablet by mouth 2 times a day for 30 days. 60 Tablet 0    amphetamine-dextroamphetamine (ADDERALL, 5MG,) 5 MG Tab Take 1 Tablet by mouth in the morning, at noon, and at bedtime for 30 days. 90 Tablet 0    [START ON 9/19/2022] amphetamine-dextroamphetamine (ADDERALL, 5MG,) 5 MG Tab Take 1 Tablet by mouth in the morning, at noon, and at bedtime for 30 days. 90 Tablet 0    [START ON 10/19/2022] amphetamine-dextroamphetamine (ADDERALL, 5MG,) 5 MG Tab Take 1 Tablet by mouth in the morning, at noon, and at bedtime for 30 days. 90 Tablet 0    Esomeprazole Magnesium 20 MG Tablet Delayed Response Take  by mouth.      albuterol 108 (90 Base) MCG/ACT Aero Soln inhalation aerosol Inhale 2 Puffs every 6 hours as needed for Shortness of Breath. 8.5 g 0    multivitamin (THERAGRAN) Tab Take 1 Tab by mouth every day.      fluticasone (FLONASE) 50 MCG/ACT nasal spray Spray 1 Spray in nose every day.      fexofenadine (ALLEGRA) 180 MG tablet Take 180 mg by mouth every day.       No current facility-administered medications for this visit.     He  has no past medical history of Chest pain, Chest tightness, Cough, Difficulty breathing, Fainting, Painful breathing, Palpitations, Shortness of breath, Sputum production, Swelling of lower extremity, or Wheezing.  He  has a past surgical history that includes hip arthroscopy (Bilateral); biceps  "tendon repair; rotator cuff repair; elbow exploration (Bilateral); and knee arthroscopy w/ acl healing response (Left).  Social History     Tobacco Use    Smoking status: Never    Smokeless tobacco: Never   Vaping Use    Vaping Use: Never used   Substance Use Topics    Alcohol use: Yes     Alcohol/week: 0.6 oz     Types: 1 Glasses of wine per week    Drug use: No     Social History     Social History Narrative    Not on file     History reviewed. No pertinent family history.  No family status information on file.         ROS    The pertinent  ROS findings can be seen in the HPI above.     All other systems reviewed and are negative     Objective:     Pulse 96   Temp 36.9 °C (98.4 °F) (Temporal)   Resp 16   Ht 1.854 m (6' 1\")   Wt 107 kg (235 lb 3.2 oz)   SpO2 95%  Body mass index is 31.03 kg/m².      Physical Exam:    Constitutional: Alert, no distress.  Skin: No suspicious lesions  Eye: Equal, round and reactive, conjunctiva clear, lids normal.  ENMT: Lips without lesions, good dentition, oropharynx clear.  Neck: Trachea midline, no masses, no thyromegaly. No cervical or supraclavicular lymphadenopathy.  Respiratory: Unlabored respiratory effort, lungs clear to auscultation, no wheezes, no ronchi.  Cardiovascular: Normal S1, S2, no murmur, no edema  Abdomen: Soft, non-tender, no masses, no hepatosplenomegaly.        Assessment and Plan:   The following treatment plan was discussed      1. Encounter for medical examination to establish care  Care has been established  We need  updated l abs to establish a clinical profile      Age-appropriate preventive services recommended by USPTF guidelines and ACIP were discussed today.      Requested any outside Medical records to be sent to us  Denies intimate partner consuelo  Discussed seat belt safety    2. Attention deficit hyperactivity disorder (ADHD), combined type    Patient has been stable with current management  We will make no changes for now      3. " Hypogonadism in male  We will obtain new labs to update clinical profile.  Then we will adjust therapy as needed.    - PROSTATE SPECIFIC AG DIAGNOSTIC; Future  - TESTOSTERONE, FREE AND TOTAL; Future    4. Primary insomnia  .Patient has been stable with current management  We will make no changes for now      5. Pure hypercholesterolemia    We will obtain new labs to update clinical profile.  Then we will adjust therapy as needed.    - Comp Metabolic Panel; Future  - CBC WITH DIFFERENTIAL; Future  - Lipid Profile; Future  - TSH+FREE T4  - T3 FREE; Future    6. Vitamin D deficiency  We will obtain new labs to update clinical profile.  Then we will adjust therapy as needed.    - VITAMIN D,25 HYDROXY; Future          Instructed to Follow up in clinic or ER for worsening symptoms, difficulty breathing, lack of expected recovery, or should new symptoms or problems arise.    Followup: Return in about 6 months (around 2/19/2023) for Reevaluation, labs.

## 2022-09-01 ENCOUNTER — HOSPITAL ENCOUNTER (OUTPATIENT)
Dept: LAB | Facility: MEDICAL CENTER | Age: 37
End: 2022-09-01
Attending: FAMILY MEDICINE
Payer: COMMERCIAL

## 2022-09-01 DIAGNOSIS — E55.9 VITAMIN D DEFICIENCY: ICD-10-CM

## 2022-09-01 DIAGNOSIS — E78.00 PURE HYPERCHOLESTEROLEMIA: ICD-10-CM

## 2022-09-01 DIAGNOSIS — E29.1 HYPOGONADISM IN MALE: ICD-10-CM

## 2022-09-01 LAB
25(OH)D3 SERPL-MCNC: 35 NG/ML (ref 30–100)
ALBUMIN SERPL BCP-MCNC: 4.7 G/DL (ref 3.2–4.9)
ALBUMIN/GLOB SERPL: 1.6 G/DL
ALP SERPL-CCNC: 72 U/L (ref 30–99)
ALT SERPL-CCNC: 43 U/L (ref 2–50)
ANION GAP SERPL CALC-SCNC: 9 MMOL/L (ref 7–16)
AST SERPL-CCNC: 24 U/L (ref 12–45)
BASOPHILS # BLD AUTO: 0.5 % (ref 0–1.8)
BASOPHILS # BLD: 0.03 K/UL (ref 0–0.12)
BILIRUB SERPL-MCNC: 0.3 MG/DL (ref 0.1–1.5)
BUN SERPL-MCNC: 15 MG/DL (ref 8–22)
CALCIUM SERPL-MCNC: 9.3 MG/DL (ref 8.4–10.2)
CHLORIDE SERPL-SCNC: 102 MMOL/L (ref 96–112)
CHOLEST SERPL-MCNC: 222 MG/DL (ref 100–199)
CO2 SERPL-SCNC: 26 MMOL/L (ref 20–33)
CREAT SERPL-MCNC: 0.96 MG/DL (ref 0.5–1.4)
EOSINOPHIL # BLD AUTO: 0.06 K/UL (ref 0–0.51)
EOSINOPHIL NFR BLD: 1.1 % (ref 0–6.9)
ERYTHROCYTE [DISTWIDTH] IN BLOOD BY AUTOMATED COUNT: 38 FL (ref 35.9–50)
FASTING STATUS PATIENT QL REPORTED: NORMAL
GFR SERPLBLD CREATININE-BSD FMLA CKD-EPI: 103 ML/MIN/1.73 M 2
GLOBULIN SER CALC-MCNC: 2.9 G/DL (ref 1.9–3.5)
GLUCOSE SERPL-MCNC: 111 MG/DL (ref 65–99)
HCT VFR BLD AUTO: 49.2 % (ref 42–52)
HDLC SERPL-MCNC: 66 MG/DL
HGB BLD-MCNC: 16.6 G/DL (ref 14–18)
IMM GRANULOCYTES # BLD AUTO: 0.01 K/UL (ref 0–0.11)
IMM GRANULOCYTES NFR BLD AUTO: 0.2 % (ref 0–0.9)
LDLC SERPL CALC-MCNC: 125 MG/DL
LYMPHOCYTES # BLD AUTO: 1.44 K/UL (ref 1–4.8)
LYMPHOCYTES NFR BLD: 26 % (ref 22–41)
MCH RBC QN AUTO: 28.8 PG (ref 27–33)
MCHC RBC AUTO-ENTMCNC: 33.7 G/DL (ref 33.7–35.3)
MCV RBC AUTO: 85.4 FL (ref 81.4–97.8)
MONOCYTES # BLD AUTO: 0.74 K/UL (ref 0–0.85)
MONOCYTES NFR BLD AUTO: 13.4 % (ref 0–13.4)
NEUTROPHILS # BLD AUTO: 3.26 K/UL (ref 1.82–7.42)
NEUTROPHILS NFR BLD: 58.8 % (ref 44–72)
NRBC # BLD AUTO: 0 K/UL
NRBC BLD-RTO: 0 /100 WBC
PLATELET # BLD AUTO: 241 K/UL (ref 164–446)
PMV BLD AUTO: 10 FL (ref 9–12.9)
POTASSIUM SERPL-SCNC: 4.5 MMOL/L (ref 3.6–5.5)
PROT SERPL-MCNC: 7.6 G/DL (ref 6–8.2)
PSA SERPL-MCNC: 0.58 NG/ML (ref 0–4)
RBC # BLD AUTO: 5.76 M/UL (ref 4.7–6.1)
SODIUM SERPL-SCNC: 137 MMOL/L (ref 135–145)
T3FREE SERPL-MCNC: 3.9 PG/ML (ref 2–4.4)
T4 FREE SERPL-MCNC: 1.39 NG/DL (ref 0.93–1.7)
TRIGL SERPL-MCNC: 153 MG/DL (ref 0–149)
TSH SERPL DL<=0.005 MIU/L-ACNC: 0.95 UIU/ML (ref 0.38–5.33)
WBC # BLD AUTO: 5.5 K/UL (ref 4.8–10.8)

## 2022-09-01 PROCEDURE — 84402 ASSAY OF FREE TESTOSTERONE: CPT

## 2022-09-01 PROCEDURE — 84403 ASSAY OF TOTAL TESTOSTERONE: CPT

## 2022-09-01 PROCEDURE — 36415 COLL VENOUS BLD VENIPUNCTURE: CPT

## 2022-09-01 PROCEDURE — 82306 VITAMIN D 25 HYDROXY: CPT

## 2022-09-01 PROCEDURE — 84481 FREE ASSAY (FT-3): CPT

## 2022-09-01 PROCEDURE — 84153 ASSAY OF PSA TOTAL: CPT

## 2022-09-01 PROCEDURE — 85025 COMPLETE CBC W/AUTO DIFF WBC: CPT

## 2022-09-01 PROCEDURE — 80053 COMPREHEN METABOLIC PANEL: CPT

## 2022-09-01 PROCEDURE — 84270 ASSAY OF SEX HORMONE GLOBUL: CPT

## 2022-09-01 PROCEDURE — 84439 ASSAY OF FREE THYROXINE: CPT

## 2022-09-01 PROCEDURE — 84443 ASSAY THYROID STIM HORMONE: CPT

## 2022-09-01 PROCEDURE — 80061 LIPID PANEL: CPT

## 2022-09-02 ENCOUNTER — NON-PROVIDER VISIT (OUTPATIENT)
Dept: MEDICAL GROUP | Age: 37
End: 2022-09-02
Payer: COMMERCIAL

## 2022-09-02 ENCOUNTER — TELEPHONE (OUTPATIENT)
Dept: MEDICAL GROUP | Age: 37
End: 2022-09-02

## 2022-09-02 DIAGNOSIS — E29.1 HYPOGONADISM IN MALE: ICD-10-CM

## 2022-09-02 PROCEDURE — 96372 THER/PROPH/DIAG INJ SC/IM: CPT | Performed by: FAMILY MEDICINE

## 2022-09-02 RX ORDER — TESTOSTERONE CYPIONATE 200 MG/ML
200 INJECTION, SOLUTION INTRAMUSCULAR
OUTPATIENT
Start: 2022-09-02 | End: 2022-12-23

## 2022-09-02 RX ORDER — TESTOSTERONE CYPIONATE 200 MG/ML
200 INJECTION, SOLUTION INTRAMUSCULAR ONCE
Status: COMPLETED | OUTPATIENT
Start: 2022-09-02 | End: 2022-09-02

## 2022-09-02 RX ORDER — TESTOSTERONE CYPIONATE 200 MG/ML
50 INJECTION, SOLUTION INTRAMUSCULAR ONCE
Status: CANCELLED | OUTPATIENT
Start: 2022-09-02 | End: 2022-09-02

## 2022-09-02 RX ADMIN — TESTOSTERONE CYPIONATE 200 MG: 200 INJECTION, SOLUTION INTRAMUSCULAR at 14:42

## 2022-09-02 RX ADMIN — TESTOSTERONE CYPIONATE 200 MG: 200 INJECTION, SOLUTION INTRAMUSCULAR at 14:46

## 2022-09-02 NOTE — TELEPHONE ENCOUNTER
Caller Name: Bassem Wilson MD   Call Back Number: [phone] (home)      How would the patient prefer to be contacted with a response: MyChart message    Patient is on the MA Schedule today for testosterone vaccine/injection.    SPECIFIC Action To Be Taken: Orders pending, please sign.

## 2022-09-02 NOTE — PROGRESS NOTES
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    Reason for injection: Hypogonadism in male  Order in MAR?: Yes  Patient supplied?:No  Minimum interval has been met for this injection (per MAR order): Yes    Patient tolerated injection and no adverse effects were observed or reported: Yes    # of Administrations remaining in MAR: 3

## 2022-09-05 LAB
SHBG SERPL-SCNC: 55 NMOL/L (ref 17–56)
TESTOST FREE MFR SERPL: 1.4 % (ref 1.6–2.9)
TESTOST FREE SERPL-MCNC: 64 PG/ML (ref 47–244)
TESTOST SERPL-MCNC: 467 NG/DL (ref 300–1080)

## 2022-09-07 DIAGNOSIS — E66.9 OBESITY (BMI 30-39.9): ICD-10-CM

## 2022-09-07 RX ORDER — PHENTERMINE HYDROCHLORIDE 30 MG/1
30 CAPSULE ORAL EVERY MORNING
Qty: 30 CAPSULE | Refills: 0 | Status: SHIPPED | OUTPATIENT
Start: 2022-09-07 | End: 2022-10-18

## 2022-09-20 DIAGNOSIS — E29.1 HYPOGONADISM IN MALE: ICD-10-CM

## 2022-09-20 RX ORDER — TESTOSTERONE CYPIONATE 200 MG/ML
200 INJECTION, SOLUTION INTRAMUSCULAR
Qty: 10 ML | Refills: 2 | Status: SHIPPED | OUTPATIENT
Start: 2022-09-20 | End: 2022-12-19

## 2022-09-21 ENCOUNTER — TELEPHONE (OUTPATIENT)
Dept: MEDICAL GROUP | Age: 37
End: 2022-09-21
Payer: COMMERCIAL

## 2022-09-21 NOTE — TELEPHONE ENCOUNTER
FINAL PRIOR AUTHORIZATION STATUS:    1.  Name of Medication & Dose: tadalafil (CIALIS) 5 MG tablet      2. Prior Auth Status: Denied.  Reason:  Medications for erectile dysfunction are not a covered benefit of this plan     3. Action Taken: Pharmacy Notified: no Patient Notified: no

## 2022-09-21 NOTE — TELEPHONE ENCOUNTER
DOCUMENTATION OF PAR STATUS:    1. Name of Medication & Dose: tadalafil (CIALIS) 5 MG tablet     2. Name of Prescription Coverage Company & phone #: [employer] Hamlin[employer]s key - [id number]    3. Date Prior Auth Submitted: 09/21/22    4. What information was given to obtain insurance decision? Benign prostatic hyperplasia without lower urinary tract symptoms (N40.0)    5. Prior Auth Status? Pending    6. Patient Notified: no

## 2022-10-17 DIAGNOSIS — E66.9 OBESITY (BMI 30-39.9): ICD-10-CM

## 2022-10-18 RX ORDER — PHENTERMINE HYDROCHLORIDE 30 MG/1
CAPSULE ORAL
Qty: 90 CAPSULE | Refills: 0 | Status: SHIPPED | OUTPATIENT
Start: 2022-10-18 | End: 2023-01-16

## 2022-11-17 ENCOUNTER — HOSPITAL ENCOUNTER (OUTPATIENT)
Dept: RADIOLOGY | Facility: MEDICAL CENTER | Age: 37
End: 2022-11-17
Attending: FAMILY MEDICINE
Payer: COMMERCIAL

## 2022-11-17 DIAGNOSIS — M25.561 RIGHT ANTERIOR KNEE PAIN: ICD-10-CM

## 2022-11-17 DIAGNOSIS — G89.29 CHRONIC PAIN OF RIGHT ANKLE: ICD-10-CM

## 2022-11-17 DIAGNOSIS — M25.571 CHRONIC PAIN OF RIGHT ANKLE: ICD-10-CM

## 2022-11-17 DIAGNOSIS — M54.40 ACUTE RIGHT-SIDED LOW BACK PAIN WITH SCIATICA, SCIATICA LATERALITY UNSPECIFIED: ICD-10-CM

## 2022-11-17 DIAGNOSIS — G89.29 CHRONIC RIGHT SHOULDER PAIN: ICD-10-CM

## 2022-11-17 DIAGNOSIS — M25.511 CHRONIC RIGHT SHOULDER PAIN: ICD-10-CM

## 2022-11-17 PROCEDURE — 73030 X-RAY EXAM OF SHOULDER: CPT | Mod: RT

## 2022-11-17 PROCEDURE — 73610 X-RAY EXAM OF ANKLE: CPT | Mod: RT

## 2022-11-17 PROCEDURE — 73562 X-RAY EXAM OF KNEE 3: CPT | Mod: RT

## 2022-11-17 PROCEDURE — 72110 X-RAY EXAM L-2 SPINE 4/>VWS: CPT

## 2022-11-18 ENCOUNTER — OFFICE VISIT (OUTPATIENT)
Dept: MEDICAL GROUP | Age: 37
End: 2022-11-18
Payer: COMMERCIAL

## 2022-11-18 ENCOUNTER — HOSPITAL ENCOUNTER (OUTPATIENT)
Dept: LAB | Facility: MEDICAL CENTER | Age: 37
End: 2022-11-18
Attending: FAMILY MEDICINE
Payer: COMMERCIAL

## 2022-11-18 VITALS
DIASTOLIC BLOOD PRESSURE: 74 MMHG | HEART RATE: 99 BPM | BODY MASS INDEX: 31.23 KG/M2 | TEMPERATURE: 98.1 F | SYSTOLIC BLOOD PRESSURE: 122 MMHG | WEIGHT: 235.6 LBS | HEIGHT: 73 IN | OXYGEN SATURATION: 94 % | RESPIRATION RATE: 16 BRPM

## 2022-11-18 DIAGNOSIS — E29.1 HYPOGONADISM IN MALE: ICD-10-CM

## 2022-11-18 DIAGNOSIS — Z11.59 NEED FOR HEPATITIS C SCREENING TEST: ICD-10-CM

## 2022-11-18 DIAGNOSIS — Z23 NEED FOR VACCINATION: ICD-10-CM

## 2022-11-18 DIAGNOSIS — E78.00 PURE HYPERCHOLESTEROLEMIA: ICD-10-CM

## 2022-11-18 LAB
ALBUMIN SERPL BCP-MCNC: 4.7 G/DL (ref 3.2–4.9)
ALBUMIN/GLOB SERPL: 1.6 G/DL
ALP SERPL-CCNC: 67 U/L (ref 30–99)
ALT SERPL-CCNC: 55 U/L (ref 2–50)
ANION GAP SERPL CALC-SCNC: 10 MMOL/L (ref 7–16)
AST SERPL-CCNC: 36 U/L (ref 12–45)
BASOPHILS # BLD AUTO: 0.7 % (ref 0–1.8)
BASOPHILS # BLD: 0.04 K/UL (ref 0–0.12)
BILIRUB SERPL-MCNC: 0.4 MG/DL (ref 0.1–1.5)
BUN SERPL-MCNC: 15 MG/DL (ref 8–22)
CALCIUM SERPL-MCNC: 9.5 MG/DL (ref 8.5–10.5)
CHLORIDE SERPL-SCNC: 100 MMOL/L (ref 96–112)
CO2 SERPL-SCNC: 26 MMOL/L (ref 20–33)
CREAT SERPL-MCNC: 0.92 MG/DL (ref 0.5–1.4)
EOSINOPHIL # BLD AUTO: 0.14 K/UL (ref 0–0.51)
EOSINOPHIL NFR BLD: 2.5 % (ref 0–6.9)
ERYTHROCYTE [DISTWIDTH] IN BLOOD BY AUTOMATED COUNT: 37.1 FL (ref 35.9–50)
FASTING STATUS PATIENT QL REPORTED: NORMAL
FASTING STATUS PATIENT QL REPORTED: NORMAL
GFR SERPLBLD CREATININE-BSD FMLA CKD-EPI: 108 ML/MIN/1.73 M 2
GLOBULIN SER CALC-MCNC: 2.9 G/DL (ref 1.9–3.5)
GLUCOSE SERPL-MCNC: 105 MG/DL (ref 65–99)
HBV SURFACE AB SERPL IA-ACNC: 3072 MIU/ML (ref 0–10)
HCT VFR BLD AUTO: 51.7 % (ref 42–52)
HGB BLD-MCNC: 17.6 G/DL (ref 14–18)
IMM GRANULOCYTES # BLD AUTO: 0.03 K/UL (ref 0–0.11)
IMM GRANULOCYTES NFR BLD AUTO: 0.5 % (ref 0–0.9)
LYMPHOCYTES # BLD AUTO: 1.81 K/UL (ref 1–4.8)
LYMPHOCYTES NFR BLD: 32.4 % (ref 22–41)
MCH RBC QN AUTO: 28.6 PG (ref 27–33)
MCHC RBC AUTO-ENTMCNC: 34 G/DL (ref 33.7–35.3)
MCV RBC AUTO: 83.9 FL (ref 81.4–97.8)
MONOCYTES # BLD AUTO: 0.63 K/UL (ref 0–0.85)
MONOCYTES NFR BLD AUTO: 11.3 % (ref 0–13.4)
NEUTROPHILS # BLD AUTO: 2.94 K/UL (ref 1.82–7.42)
NEUTROPHILS NFR BLD: 52.6 % (ref 44–72)
NRBC # BLD AUTO: 0 K/UL
NRBC BLD-RTO: 0 /100 WBC
PLATELET # BLD AUTO: 274 K/UL (ref 164–446)
PMV BLD AUTO: 9.6 FL (ref 9–12.9)
POTASSIUM SERPL-SCNC: 4.3 MMOL/L (ref 3.6–5.5)
PROT SERPL-MCNC: 7.6 G/DL (ref 6–8.2)
PSA SERPL-MCNC: 0.87 NG/ML (ref 0–4)
RBC # BLD AUTO: 6.16 M/UL (ref 4.7–6.1)
SODIUM SERPL-SCNC: 136 MMOL/L (ref 135–145)
WBC # BLD AUTO: 5.6 K/UL (ref 4.8–10.8)

## 2022-11-18 PROCEDURE — 85025 COMPLETE CBC W/AUTO DIFF WBC: CPT

## 2022-11-18 PROCEDURE — 99214 OFFICE O/P EST MOD 30 MIN: CPT | Mod: 25 | Performed by: FAMILY MEDICINE

## 2022-11-18 PROCEDURE — 90686 IIV4 VACC NO PRSV 0.5 ML IM: CPT | Performed by: FAMILY MEDICINE

## 2022-11-18 PROCEDURE — 36415 COLL VENOUS BLD VENIPUNCTURE: CPT

## 2022-11-18 PROCEDURE — 84402 ASSAY OF FREE TESTOSTERONE: CPT

## 2022-11-18 PROCEDURE — 80053 COMPREHEN METABOLIC PANEL: CPT

## 2022-11-18 PROCEDURE — 84153 ASSAY OF PSA TOTAL: CPT

## 2022-11-18 PROCEDURE — 90471 IMMUNIZATION ADMIN: CPT | Performed by: FAMILY MEDICINE

## 2022-11-18 PROCEDURE — 84403 ASSAY OF TOTAL TESTOSTERONE: CPT

## 2022-11-18 PROCEDURE — 90715 TDAP VACCINE 7 YRS/> IM: CPT | Performed by: FAMILY MEDICINE

## 2022-11-18 PROCEDURE — 84270 ASSAY OF SEX HORMONE GLOBUL: CPT

## 2022-11-18 PROCEDURE — 87522 HEPATITIS C REVRS TRNSCRPJ: CPT

## 2022-11-18 PROCEDURE — 90472 IMMUNIZATION ADMIN EACH ADD: CPT | Performed by: FAMILY MEDICINE

## 2022-11-18 PROCEDURE — 86706 HEP B SURFACE ANTIBODY: CPT

## 2022-11-18 RX ORDER — AZITHROMYCIN 250 MG/1
TABLET, FILM COATED ORAL
COMMUNITY
Start: 2022-10-06 | End: 2022-11-18

## 2022-11-18 RX ORDER — CHLORHEXIDINE GLUCONATE ORAL RINSE 1.2 MG/ML
SOLUTION DENTAL
COMMUNITY
Start: 2022-10-06 | End: 2022-11-18

## 2022-11-18 RX ORDER — IBUPROFEN 800 MG/1
TABLET ORAL
COMMUNITY
Start: 2022-10-06 | End: 2023-02-24

## 2022-11-18 ASSESSMENT — FIBROSIS 4 INDEX: FIB4 SCORE: 0.59

## 2022-11-18 NOTE — PROGRESS NOTES
This medical record contains text that has been entered with the assistance of computer voice recognition and dictation software.  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 Hypogonadism in male  Overall case he has noticed a big improvement in his energy, overall mood and just improved vigor of life.  He has not had any side effects such as anxiety chest pain, he is exercising more and gaining muscle he would like to continue.     Latest Reference Range & Units 09/01/22 07:44   Free Testosterone 47 - 244 pg/mL 64   Sex Hormone Bind Globulin 17 - 56 nmol/L 55   Testosterone % Free 1.6 - 2.9 % 1.4 (L)   Testosterone,Total 300 - 1080 ng/dL 467   (L): Data is abnormally low    2. Pure hypercholesterolemia  Bassem has been using lifestyle modification to address this.  He will be due for repeat labs.  He would like to avoid medications if possible.       Latest Reference Range & Units 09/01/22 07:44   Cholesterol,Tot 100 - 199 mg/dL 222 (H)   Triglycerides 0 - 149 mg/dL 153 (H)   HDL >=40 mg/dL 66   LDL <100 mg/dL 125 (H)   (H): Data is abnormally high    3. Need for vaccination    Due for flu vaccine as well as Tdap    4. Need for hepatitis C screening test  Patient is also due for hep C screening    Current medicines (including changes today)  Current Outpatient Medications   Medication Sig Dispense Refill    ibuprofen (MOTRIN) 800 MG Tab PLEASE SEE ATTACHED FOR DETAILED DIRECTIONS      zolpidem (AMBIEN) 10 MG Tab Take 1 Tablet by mouth at bedtime as needed for Sleep for up to 180 days. 90 Tablet 1    phentermine 30 MG capsule TAKE 1 CAPSULE BY MOUTH EVERY DAY IN THE MORNING 90 Capsule 0    Needles & Syringes Misc Please  provide patient with 3 mL syringes, 18-gauge needles, 22-gauge needles sufficient amount 100 Each 2    testosterone cypionate (DEPO-TESTOSTERONE) 200 MG/ML Solution injection Inject 1 mL into the shoulder, thigh, or buttocks every 14 days for 90 days. 10 mL 2    amphetamine-dextroamphetamine (ADDERALL, 5MG,) 5 MG Tab Take 1 Tablet by mouth in the morning, at noon, and at bedtime for 30 days. 90 Tablet 0    albuterol 108 (90 Base) MCG/ACT Aero Soln inhalation aerosol Inhale 2 Puffs every 6 hours as needed for Shortness of Breath. 8.5 g 0    fexofenadine (ALLEGRA) 180 MG tablet Take 1 Tablet by mouth every day. 90 Tablet 0    fluticasone (FLONASE) 50 MCG/ACT nasal spray Administer 1 Spray into affected nostril(S) every day. 16 g 7    tadalafil (CIALIS) 5 MG tablet Take one tab daily po q24h for bph 90 Tablet 0    Esomeprazole Magnesium 20 MG Tablet Delayed Response Take  by mouth.      multivitamin (THERAGRAN) Tab Take 1 Tablet by mouth every day.       Current Facility-Administered Medications   Medication Dose Route Frequency Provider Last Rate Last Admin    testosterone cypionate (DEPO-TESTOSTERONE) injection 200 mg  200 mg Intramuscular Q14 DAYS Jeanmarie Tuttle M.D.   200 mg at 09/02/22 1442     He  has no past medical history of Chest pain, Chest tightness, Cough, Difficulty breathing, Fainting, Painful breathing, Palpitations, Shortness of breath, Sputum production, Swelling of lower extremity, or Wheezing.  He  has a past surgical history that includes hip arthroscopy (Bilateral); biceps tendon repair; rotator cuff repair; elbow exploration (Bilateral); and knee arthroscopy w/ acl healing response (Left).  Social History     Tobacco Use    Smoking status: Never    Smokeless tobacco: Never   Vaping Use    Vaping Use: Never used   Substance Use Topics    Alcohol use: Yes     Alcohol/week: 0.6 oz     Types: 1 Glasses of wine per week    Drug use: No     Social History     Social History Narrative    Not on file  "    History reviewed. No pertinent family history.  No family status information on file.         ROS    The pertinent  ROS findings can be seen in the HPI above.     All other systems reviewed and are negative     Objective:     /74 (BP Location: Right arm, Patient Position: Sitting, BP Cuff Size: Adult long)   Pulse 99   Temp 36.7 °C (98.1 °F) (Temporal)   Resp 16   Ht 1.854 m (6' 1\")   Wt 107 kg (235 lb 9.6 oz)   SpO2 94%  Body mass index is 31.08 kg/m².      Physical Exam:    Constitutional: Alert, no distress.  Skin: No suspicious lesions  Eye: Equal, round and reactive, conjunctiva clear, lids normal.  ENMT: Lips without lesions, good dentition, oropharynx clear.  Neck: Trachea midline, no masses, no thyromegaly. No cervical or supraclavicular lymphadenopathy.  Respiratory: Unlabored respiratory effort, lungs clear to auscultation, no wheezes, no ronchi.  Cardiovascular: Normal S1, S2, no murmur, no edema  Abdomen: Soft, non-tender, no masses, no hepatosplenomegaly.        Assessment and Plan:   The following treatment plan was discussed      1. Hypogonadism in male    We will obtain new labs to update clinical profile.  Then we will adjust therapy as needed.   - Comp Metabolic Panel; Future  - CBC WITH DIFFERENTIAL; Future  - PROSTATE SPECIFIC AG DIAGNOSTIC; Future  - TESTOSTERONE, FREE AND TOTAL; Future    2. Pure hypercholesterolemia    We will obtain new labs to update clinical profile.  Then we will adjust therapy as needed.    - Comp Metabolic Panel; Future  - CBC WITH DIFFERENTIAL; Future      3. Need for vaccination    Vaccine was administered today without adverse event.    - INFLUENZA VACCINE QUAD INJ (PF)  - Tdap Vaccine =>6YO IM  - HEP B SURFACE AB; Future    4. Need for hepatitis C screening test  - HCV RNA QUANT, PCR; Future    Other orders  - ibuprofen (MOTRIN) 800 MG Tab; PLEASE SEE ATTACHED FOR DETAILED DIRECTIONS             Instructed to Follow up in clinic or ER for worsening " symptoms, difficulty breathing, lack of expected recovery, or should new symptoms or problems arise.    Followup: Return in about 3 months (around 2/18/2023) for Reevaluation, Medication refill.

## 2022-11-18 NOTE — RESULT ENCOUNTER NOTE
I discussed results and plan with patient, who stated understanding.       Jeanmarie Tuttle MD  [employer], [address] [address]

## 2022-11-18 NOTE — RESULT ENCOUNTER NOTE
I discussed results and plan with patient, who stated understanding.       Jeanmarie Tuttle MD  [employer], [address] [address]

## 2022-11-18 NOTE — RESULT ENCOUNTER NOTE
I discussed results and plan with patient, who stated understanding.       Jeanmarie Tuttle MD  [employer], [address] [address]

## 2022-11-23 LAB
HCV RNA SERPL NAA+PROBE-ACNC: NOT DETECTED IU/ML
HCV RNA SERPL NAA+PROBE-LOG IU: NOT DETECTED LOG IU/ML
HCV RNA SERPL QL NAA+PROBE: NOT DETECTED
SHBG SERPL-SCNC: 45 NMOL/L (ref 17–56)
TESTOST FREE MFR SERPL: ABNORMAL % (ref 1.6–2.9)
TESTOST FREE SERPL-MCNC: ABNORMAL PG/ML (ref 47–244)
TESTOST SERPL-MCNC: >1500 NG/DL (ref 300–1080)

## 2022-12-14 ENCOUNTER — PATIENT MESSAGE (OUTPATIENT)
Dept: MEDICAL GROUP | Age: 37
End: 2022-12-14
Payer: COMMERCIAL

## 2022-12-14 DIAGNOSIS — M25.561 RIGHT ANTERIOR KNEE PAIN: ICD-10-CM

## 2022-12-14 DIAGNOSIS — M25.571 CHRONIC PAIN OF RIGHT ANKLE: ICD-10-CM

## 2022-12-14 DIAGNOSIS — G89.29 CHRONIC PAIN OF RIGHT ANKLE: ICD-10-CM

## 2022-12-16 ENCOUNTER — HOSPITAL ENCOUNTER (OUTPATIENT)
Dept: RADIOLOGY | Facility: MEDICAL CENTER | Age: 37
End: 2022-12-16
Attending: FAMILY MEDICINE
Payer: COMMERCIAL

## 2022-12-16 DIAGNOSIS — G89.29 CHRONIC PAIN OF RIGHT ANKLE: ICD-10-CM

## 2022-12-16 DIAGNOSIS — M25.571 CHRONIC PAIN OF RIGHT ANKLE: ICD-10-CM

## 2022-12-16 DIAGNOSIS — M25.561 RIGHT ANTERIOR KNEE PAIN: ICD-10-CM

## 2022-12-16 PROCEDURE — 73721 MRI JNT OF LWR EXTRE W/O DYE: CPT | Mod: RT

## 2022-12-29 ENCOUNTER — APPOINTMENT (OUTPATIENT)
Dept: RADIOLOGY | Facility: MEDICAL CENTER | Age: 37
End: 2022-12-29
Attending: FAMILY MEDICINE
Payer: COMMERCIAL

## 2022-12-29 DIAGNOSIS — M25.511 ACUTE PAIN OF RIGHT SHOULDER: ICD-10-CM

## 2022-12-29 DIAGNOSIS — M25.512 ACUTE PAIN OF LEFT SHOULDER: ICD-10-CM

## 2022-12-29 PROCEDURE — 73030 X-RAY EXAM OF SHOULDER: CPT | Mod: LT

## 2022-12-29 PROCEDURE — 73030 X-RAY EXAM OF SHOULDER: CPT

## 2023-01-13 ENCOUNTER — NON-PROVIDER VISIT (OUTPATIENT)
Dept: MEDICAL GROUP | Age: 38
End: 2023-01-13
Payer: COMMERCIAL

## 2023-01-13 DIAGNOSIS — E29.1 HYPOGONADISM IN MALE: ICD-10-CM

## 2023-01-13 PROCEDURE — RXMED WILLOW AMBULATORY MEDICATION CHARGE: Performed by: FAMILY MEDICINE

## 2023-01-13 PROCEDURE — 96372 THER/PROPH/DIAG INJ SC/IM: CPT | Performed by: FAMILY MEDICINE

## 2023-01-13 RX ORDER — TESTOSTERONE CYPIONATE 200 MG/ML
200 INJECTION, SOLUTION INTRAMUSCULAR
Qty: 10 ML | Refills: 1 | Status: SHIPPED | OUTPATIENT
Start: 2023-01-13 | End: 2023-09-28 | Stop reason: SDUPTHER

## 2023-01-13 RX ORDER — TESTOSTERONE CYPIONATE 200 MG/ML
200 INJECTION, SOLUTION INTRAMUSCULAR ONCE
Status: COMPLETED | OUTPATIENT
Start: 2023-01-13 | End: 2023-01-13

## 2023-01-13 RX ADMIN — TESTOSTERONE CYPIONATE 200 MG: 200 INJECTION, SOLUTION INTRAMUSCULAR at 09:50

## 2023-01-13 NOTE — PROGRESS NOTES
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    Reason for injection: Hypogonadism in male  Order in MAR?: Yes  Patient supplied?:No  Minimum interval has been met for this injection (per MAR order): Yes    Patient tolerated injection and no adverse effects were observed or reported: Yes    # of Administrations remaining in MAR: 0

## 2023-01-20 PROCEDURE — RXMED WILLOW AMBULATORY MEDICATION CHARGE: Performed by: FAMILY MEDICINE

## 2023-01-27 ENCOUNTER — PHARMACY VISIT (OUTPATIENT)
Dept: PHARMACY | Facility: MEDICAL CENTER | Age: 38
End: 2023-01-27
Payer: COMMERCIAL

## 2023-02-03 DIAGNOSIS — M25.512 CHRONIC LEFT SHOULDER PAIN: ICD-10-CM

## 2023-02-03 DIAGNOSIS — G89.29 CHRONIC LEFT SHOULDER PAIN: ICD-10-CM

## 2023-02-03 DIAGNOSIS — M25.512 ACUTE PAIN OF LEFT SHOULDER: ICD-10-CM

## 2023-02-10 ENCOUNTER — APPOINTMENT (OUTPATIENT)
Dept: RADIOLOGY | Facility: MEDICAL CENTER | Age: 38
End: 2023-02-10
Attending: FAMILY MEDICINE
Payer: COMMERCIAL

## 2023-02-10 DIAGNOSIS — M25.512 CHRONIC LEFT SHOULDER PAIN: ICD-10-CM

## 2023-02-10 DIAGNOSIS — G89.29 CHRONIC LEFT SHOULDER PAIN: ICD-10-CM

## 2023-02-10 PROCEDURE — 73221 MRI JOINT UPR EXTREM W/O DYE: CPT | Mod: LT

## 2023-02-14 NOTE — RESULT ENCOUNTER NOTE
I discussed results and plan with patient, who stated understanding.       Jeanmarie Tuttle MD  [employer], [address] [address]

## 2023-02-15 ENCOUNTER — PATIENT MESSAGE (OUTPATIENT)
Dept: MEDICAL GROUP | Age: 38
End: 2023-02-15
Payer: COMMERCIAL

## 2023-02-15 DIAGNOSIS — M75.102 TEAR OF LEFT SUPRASPINATUS TENDON: ICD-10-CM

## 2023-02-18 DIAGNOSIS — F51.01 PRIMARY INSOMNIA: ICD-10-CM

## 2023-02-18 PROCEDURE — RXMED WILLOW AMBULATORY MEDICATION CHARGE: Performed by: FAMILY MEDICINE

## 2023-02-19 NOTE — PATIENT COMMUNICATION
Pt requesting urgent referral to Dr. Galaviz with orthopedics r/t MRI results. Referral request routed to PCP

## 2023-02-22 ENCOUNTER — PHARMACY VISIT (OUTPATIENT)
Dept: PHARMACY | Facility: MEDICAL CENTER | Age: 38
End: 2023-02-22
Payer: COMMERCIAL

## 2023-02-22 ENCOUNTER — TELEPHONE (OUTPATIENT)
Dept: MEDICAL GROUP | Age: 38
End: 2023-02-22
Payer: COMMERCIAL

## 2023-02-22 DIAGNOSIS — M75.102 TEAR OF LEFT SUPRASPINATUS TENDON: ICD-10-CM

## 2023-02-22 RX ORDER — ZOLPIDEM TARTRATE 10 MG/1
10 TABLET ORAL NIGHTLY PRN
Qty: 90 TABLET | Refills: 1 | Status: SHIPPED | OUTPATIENT
Start: 2023-02-22 | End: 2023-08-21

## 2023-02-24 ENCOUNTER — PRE-ADMISSION TESTING (OUTPATIENT)
Dept: ADMISSIONS | Facility: MEDICAL CENTER | Age: 38
End: 2023-02-24
Attending: ORTHOPAEDIC SURGERY
Payer: COMMERCIAL

## 2023-02-24 ASSESSMENT — FIBROSIS 4 INDEX: FIB4 SCORE: 0.69

## 2023-02-24 NOTE — OR NURSING
Patient instructed to continue prescribed medications through the day before surgery, verbal and written instructions given to take the following medications the day of surgery per anesthesia protocol: ALBUTEROL, ESOMEPRAZOLE, ALLEGRA, FLONASE          Verbal and written, and pre-admit video website instructions provided via email. Pt states he received the email and denies questions or concerns.

## 2023-03-01 ENCOUNTER — HOSPITAL ENCOUNTER (OUTPATIENT)
Facility: MEDICAL CENTER | Age: 38
End: 2023-03-01
Attending: ORTHOPAEDIC SURGERY | Admitting: ORTHOPAEDIC SURGERY
Payer: COMMERCIAL

## 2023-03-01 ENCOUNTER — ANESTHESIA EVENT (OUTPATIENT)
Dept: SURGERY | Facility: MEDICAL CENTER | Age: 38
End: 2023-03-01
Payer: COMMERCIAL

## 2023-03-01 ENCOUNTER — ANESTHESIA (OUTPATIENT)
Dept: SURGERY | Facility: MEDICAL CENTER | Age: 38
End: 2023-03-01
Payer: COMMERCIAL

## 2023-03-01 VITALS
HEIGHT: 73 IN | BODY MASS INDEX: 31.47 KG/M2 | DIASTOLIC BLOOD PRESSURE: 64 MMHG | SYSTOLIC BLOOD PRESSURE: 107 MMHG | WEIGHT: 237.44 LBS | RESPIRATION RATE: 16 BRPM | TEMPERATURE: 97.5 F | HEART RATE: 92 BPM | OXYGEN SATURATION: 92 %

## 2023-03-01 DIAGNOSIS — S46.012A TRAUMATIC COMPLETE TEAR OF LEFT ROTATOR CUFF, INITIAL ENCOUNTER: ICD-10-CM

## 2023-03-01 PROCEDURE — 160029 HCHG SURGERY MINUTES - 1ST 30 MINS LEVEL 4: Performed by: ORTHOPAEDIC SURGERY

## 2023-03-01 PROCEDURE — 160046 HCHG PACU - 1ST 60 MINS PHASE II: Performed by: ORTHOPAEDIC SURGERY

## 2023-03-01 PROCEDURE — 700102 HCHG RX REV CODE 250 W/ 637 OVERRIDE(OP): Performed by: ANESTHESIOLOGY

## 2023-03-01 PROCEDURE — 160025 RECOVERY II MINUTES (STATS): Performed by: ORTHOPAEDIC SURGERY

## 2023-03-01 PROCEDURE — C1713 ANCHOR/SCREW BN/BN,TIS/BN: HCPCS | Performed by: ORTHOPAEDIC SURGERY

## 2023-03-01 PROCEDURE — 160048 HCHG OR STATISTICAL LEVEL 1-5: Performed by: ORTHOPAEDIC SURGERY

## 2023-03-01 PROCEDURE — 64415 NJX AA&/STRD BRCH PLXS IMG: CPT | Performed by: ORTHOPAEDIC SURGERY

## 2023-03-01 PROCEDURE — A9270 NON-COVERED ITEM OR SERVICE: HCPCS | Performed by: ANESTHESIOLOGY

## 2023-03-01 PROCEDURE — 160035 HCHG PACU - 1ST 60 MINS PHASE I: Performed by: ORTHOPAEDIC SURGERY

## 2023-03-01 PROCEDURE — 160002 HCHG RECOVERY MINUTES (STAT): Performed by: ORTHOPAEDIC SURGERY

## 2023-03-01 PROCEDURE — 700111 HCHG RX REV CODE 636 W/ 250 OVERRIDE (IP): Performed by: ANESTHESIOLOGY

## 2023-03-01 PROCEDURE — 64415 NJX AA&/STRD BRCH PLXS IMG: CPT | Mod: 59,LT | Performed by: ANESTHESIOLOGY

## 2023-03-01 PROCEDURE — 160009 HCHG ANES TIME/MIN: Performed by: ORTHOPAEDIC SURGERY

## 2023-03-01 PROCEDURE — 160036 HCHG PACU - EA ADDL 30 MINS PHASE I: Performed by: ORTHOPAEDIC SURGERY

## 2023-03-01 PROCEDURE — 160041 HCHG SURGERY MINUTES - EA ADDL 1 MIN LEVEL 4: Performed by: ORTHOPAEDIC SURGERY

## 2023-03-01 PROCEDURE — 700101 HCHG RX REV CODE 250: Performed by: ANESTHESIOLOGY

## 2023-03-01 PROCEDURE — 700105 HCHG RX REV CODE 258: Performed by: ORTHOPAEDIC SURGERY

## 2023-03-01 PROCEDURE — 700105 HCHG RX REV CODE 258: Performed by: ANESTHESIOLOGY

## 2023-03-01 PROCEDURE — 01630 ANES OPN/ARTHR PX SHO JT NOS: CPT | Performed by: ANESTHESIOLOGY

## 2023-03-01 PROCEDURE — 700101 HCHG RX REV CODE 250: Performed by: ORTHOPAEDIC SURGERY

## 2023-03-01 DEVICE — ANCHOR SUTURE ICONIX FORCE FIBER LATEX FREE 2.3MM (5EA/BX): Type: IMPLANTABLE DEVICE | Site: SHOULDER | Status: FUNCTIONAL

## 2023-03-01 DEVICE — SUTURE 2MM ANCHOR TAPE ICONIX SPEED (5EA/BX): Type: IMPLANTABLE DEVICE | Site: SHOULDER | Status: FUNCTIONAL

## 2023-03-01 DEVICE — ANCHOR KNOTLESS REELX STT 4.5MM (5EA/BX): Type: IMPLANTABLE DEVICE | Site: SHOULDER | Status: FUNCTIONAL

## 2023-03-01 RX ORDER — CELECOXIB 200 MG/1
200 CAPSULE ORAL ONCE
Status: COMPLETED | OUTPATIENT
Start: 2023-03-01 | End: 2023-03-01

## 2023-03-01 RX ORDER — OXYCODONE HCL 5 MG/5 ML
10 SOLUTION, ORAL ORAL
Status: COMPLETED | OUTPATIENT
Start: 2023-03-01 | End: 2023-03-01

## 2023-03-01 RX ORDER — HALOPERIDOL 5 MG/ML
1 INJECTION INTRAMUSCULAR
Status: DISCONTINUED | OUTPATIENT
Start: 2023-03-01 | End: 2023-03-01 | Stop reason: HOSPADM

## 2023-03-01 RX ORDER — ROPIVACAINE HYDROCHLORIDE 5 MG/ML
INJECTION, SOLUTION EPIDURAL; INFILTRATION; PERINEURAL
Status: COMPLETED | OUTPATIENT
Start: 2023-03-01 | End: 2023-03-01

## 2023-03-01 RX ORDER — ACETAMINOPHEN 500 MG
1000 TABLET ORAL ONCE
Status: COMPLETED | OUTPATIENT
Start: 2023-03-01 | End: 2023-03-01

## 2023-03-01 RX ORDER — MIDAZOLAM HYDROCHLORIDE 1 MG/ML
INJECTION INTRAMUSCULAR; INTRAVENOUS PRN
Status: DISCONTINUED | OUTPATIENT
Start: 2023-03-01 | End: 2023-03-02 | Stop reason: SURG

## 2023-03-01 RX ORDER — ONDANSETRON 2 MG/ML
INJECTION INTRAMUSCULAR; INTRAVENOUS PRN
Status: DISCONTINUED | OUTPATIENT
Start: 2023-03-01 | End: 2023-03-02 | Stop reason: SURG

## 2023-03-01 RX ORDER — ROCURONIUM BROMIDE 10 MG/ML
INJECTION, SOLUTION INTRAVENOUS PRN
Status: DISCONTINUED | OUTPATIENT
Start: 2023-03-01 | End: 2023-03-02 | Stop reason: SURG

## 2023-03-01 RX ORDER — MEPERIDINE HYDROCHLORIDE 25 MG/ML
6.25 INJECTION INTRAMUSCULAR; INTRAVENOUS; SUBCUTANEOUS
Status: DISCONTINUED | OUTPATIENT
Start: 2023-03-01 | End: 2023-03-01 | Stop reason: HOSPADM

## 2023-03-01 RX ORDER — SODIUM CHLORIDE, SODIUM GLUCONATE, SODIUM ACETATE, POTASSIUM CHLORIDE AND MAGNESIUM CHLORIDE 526; 502; 368; 37; 30 MG/100ML; MG/100ML; MG/100ML; MG/100ML; MG/100ML
INJECTION, SOLUTION INTRAVENOUS
Status: DISCONTINUED | OUTPATIENT
Start: 2023-03-01 | End: 2023-03-02 | Stop reason: SURG

## 2023-03-01 RX ORDER — DIPHENHYDRAMINE HYDROCHLORIDE 50 MG/ML
12.5 INJECTION INTRAMUSCULAR; INTRAVENOUS
Status: DISCONTINUED | OUTPATIENT
Start: 2023-03-01 | End: 2023-03-01 | Stop reason: HOSPADM

## 2023-03-01 RX ORDER — OXYCODONE HCL 5 MG/5 ML
5 SOLUTION, ORAL ORAL
Status: COMPLETED | OUTPATIENT
Start: 2023-03-01 | End: 2023-03-01

## 2023-03-01 RX ORDER — DEXAMETHASONE SODIUM PHOSPHATE 4 MG/ML
INJECTION, SOLUTION INTRA-ARTICULAR; INTRALESIONAL; INTRAMUSCULAR; INTRAVENOUS; SOFT TISSUE PRN
Status: DISCONTINUED | OUTPATIENT
Start: 2023-03-01 | End: 2023-03-02 | Stop reason: SURG

## 2023-03-01 RX ORDER — LIDOCAINE HYDROCHLORIDE 20 MG/ML
INJECTION, SOLUTION EPIDURAL; INFILTRATION; INTRACAUDAL; PERINEURAL PRN
Status: DISCONTINUED | OUTPATIENT
Start: 2023-03-01 | End: 2023-03-02 | Stop reason: SURG

## 2023-03-01 RX ORDER — PHENYLEPHRINE HCL IN 0.9% NACL 0.5 MG/5ML
SYRINGE (ML) INTRAVENOUS PRN
Status: DISCONTINUED | OUTPATIENT
Start: 2023-03-01 | End: 2023-03-02 | Stop reason: SURG

## 2023-03-01 RX ORDER — SODIUM CHLORIDE, SODIUM LACTATE, POTASSIUM CHLORIDE, CALCIUM CHLORIDE 600; 310; 30; 20 MG/100ML; MG/100ML; MG/100ML; MG/100ML
INJECTION, SOLUTION INTRAVENOUS CONTINUOUS
Status: ACTIVE | OUTPATIENT
Start: 2023-03-01 | End: 2023-03-01

## 2023-03-01 RX ORDER — BUPIVACAINE HYDROCHLORIDE AND EPINEPHRINE 5; 5 MG/ML; UG/ML
INJECTION, SOLUTION EPIDURAL; INTRACAUDAL; PERINEURAL
Status: DISCONTINUED | OUTPATIENT
Start: 2023-03-01 | End: 2023-03-01 | Stop reason: HOSPADM

## 2023-03-01 RX ORDER — ONDANSETRON 2 MG/ML
4 INJECTION INTRAMUSCULAR; INTRAVENOUS
Status: COMPLETED | OUTPATIENT
Start: 2023-03-01 | End: 2023-03-01

## 2023-03-01 RX ADMIN — FENTANYL CITRATE 25 MCG: 50 INJECTION, SOLUTION INTRAMUSCULAR; INTRAVENOUS at 10:52

## 2023-03-01 RX ADMIN — MIDAZOLAM HYDROCHLORIDE 2 MG: 1 INJECTION, SOLUTION INTRAMUSCULAR; INTRAVENOUS at 07:28

## 2023-03-01 RX ADMIN — FENTANYL CITRATE 25 MCG: 50 INJECTION, SOLUTION INTRAMUSCULAR; INTRAVENOUS at 10:42

## 2023-03-01 RX ADMIN — Medication 100 MCG: at 08:56

## 2023-03-01 RX ADMIN — SODIUM CHLORIDE, POTASSIUM CHLORIDE, SODIUM LACTATE AND CALCIUM CHLORIDE: 600; 310; 30; 20 INJECTION, SOLUTION INTRAVENOUS at 06:35

## 2023-03-01 RX ADMIN — OXYCODONE HYDROCHLORIDE 10 MG: 5 SOLUTION ORAL at 11:10

## 2023-03-01 RX ADMIN — ONDANSETRON 4 MG: 2 INJECTION INTRAMUSCULAR; INTRAVENOUS at 07:42

## 2023-03-01 RX ADMIN — ONDANSETRON 4 MG: 2 INJECTION INTRAMUSCULAR; INTRAVENOUS at 10:38

## 2023-03-01 RX ADMIN — CELECOXIB 200 MG: 200 CAPSULE ORAL at 06:25

## 2023-03-01 RX ADMIN — DEXAMETHASONE SODIUM PHOSPHATE 8 MG: 4 INJECTION, SOLUTION INTRA-ARTICULAR; INTRALESIONAL; INTRAMUSCULAR; INTRAVENOUS; SOFT TISSUE at 07:42

## 2023-03-01 RX ADMIN — PROPOFOL 200 MG: 10 INJECTION, EMULSION INTRAVENOUS at 07:32

## 2023-03-01 RX ADMIN — ROCURONIUM BROMIDE 40 MG: 10 INJECTION, SOLUTION INTRAVENOUS at 07:32

## 2023-03-01 RX ADMIN — Medication 100 MCG: at 09:30

## 2023-03-01 RX ADMIN — LIDOCAINE HYDROCHLORIDE 100 MG: 20 INJECTION, SOLUTION EPIDURAL; INFILTRATION; INTRACAUDAL at 07:32

## 2023-03-01 RX ADMIN — ROPIVACAINE HYDROCHLORIDE 30 ML: 5 INJECTION, SOLUTION EPIDURAL; INFILTRATION; PERINEURAL at 07:16

## 2023-03-01 RX ADMIN — LIDOCAINE HYDROCHLORIDE 0.5 ML: 10 INJECTION, SOLUTION EPIDURAL; INFILTRATION; INTRACAUDAL; PERINEURAL at 06:34

## 2023-03-01 RX ADMIN — CLINDAMYCIN PHOSPHATE 900 MG: 150 INJECTION, SOLUTION INTRAMUSCULAR; INTRAVENOUS at 07:30

## 2023-03-01 RX ADMIN — ACETAMINOPHEN 1000 MG: 500 TABLET ORAL at 06:25

## 2023-03-01 RX ADMIN — SODIUM CHLORIDE, SODIUM GLUCONATE, SODIUM ACETATE, POTASSIUM CHLORIDE AND MAGNESIUM CHLORIDE: 526; 502; 368; 37; 30 INJECTION, SOLUTION INTRAVENOUS at 09:12

## 2023-03-01 RX ADMIN — Medication 100 MCG: at 09:03

## 2023-03-01 RX ADMIN — FENTANYL CITRATE 50 MCG: 50 INJECTION, SOLUTION INTRAMUSCULAR; INTRAVENOUS at 07:32

## 2023-03-01 RX ADMIN — Medication 100 MCG: at 09:17

## 2023-03-01 ASSESSMENT — PAIN DESCRIPTION - PAIN TYPE: TYPE: CHRONIC PAIN

## 2023-03-01 ASSESSMENT — FIBROSIS 4 INDEX: FIB4 SCORE: 0.69

## 2023-03-01 NOTE — OR NURSING
0957 To PACU from OR via gurney, side rails up x 2 for safety, lungs clear bilaterally, scds on patient and machine operational, dressings to L shoulder CDI with immobilizer in place to LUE and +1 L radial pulse noted with pink/warm fingers. Pt does not arouse to voice or touch. Breathing easy and unlabored with OPA in place. HOB elevated to 30 degrees.   1010 Pt does not respond verbally to RN but opens eyes briefly and gives thumbs up when asked how he is. Breathing easy and unlabored.   1025 Pt arouses easily to voice and reports pain to biceps at 7/10 and mild nausea.   1040 Zofran given and will give Fentanyl for pain control. Pain rated as 8/10 to biceps area. Ice pack remains in place   1055 Pain rated as 7/10 to biceps area and not tolerable. Nausea improving per pt.   1110 Pt taking po fluids well and crackers. Pt reports pain as 5/10 and requesting oxycodone. Denies nausea.   1125 Pt reports pain rated as 4/10 and tolerable. Nausea resolved.   1140 Pt meets criteria for transfer to stage II.

## 2023-03-01 NOTE — ANESTHESIA PROCEDURE NOTES
Peripheral Block    Date/Time: 3/1/2023 7:16 AM  Performed by: Alexis Cantu M.D.  Authorized by: Alexis Cantu M.D.     Patient Location:  Pre-op  Start Time:  3/1/2023 7:16 AM  End Time:  3/1/2023 7:20 AM  Reason for Block: at surgeon's request and post-op pain management ONLY    patient identified, IV checked, site marked, risks and benefits discussed, surgical consent, monitors and equipment checked, pre-op evaluation and timeout performed    Patient Position:  Supine  Prep: ChloraPrep    Monitoring:  Heart rate, continuous pulse ox and cardiac monitor  Block Region:  Upper Extremity  Upper Extremity - Block Type:  BRACHIAL PLEXUS block, Interscalene approach    Laterality:  Left  Procedures: ultrasound guided  Image captured, interpreted and electronically stored.  Local Infiltration:  Lidocaine  Strength:  2 %  Dose:  5 ml  Block Type:  Single-shot  Needle Length:  100mm  Needle Gauge:  21 G  Needle Localization:  Ultrasound guidance  Injection Assessment:  Negative aspiration for heme, no paresthesia on injection, incremental injection and local visualized surrounding nerve on ultrasound  Evidence of intravascular injection: No     US Guided Left Interscalene Brachial Plexus Block   US transducer placed on the left side of the neck in oblique plane approximately at the level of C6.  The Left Anterior and Middle Scalene (MSM) muscles identified with nerve trunks identified between the muscles.  Needle inserted lateral to probe and advanced under direct visualization through the MSM into a perineural position.  After negative aspiration, 30 ml of LA injected with ease in divided doses and visualized surrounding the nerve trunks.

## 2023-03-01 NOTE — ANESTHESIA PROCEDURE NOTES
Airway    Date/Time: 3/1/2023 7:34 AM  Performed by: Alexis Cantu M.D.  Authorized by: Alexis Cantu M.D.     Location:  OR  Urgency:  Elective  Difficult Airway: No    Indications for Airway Management:  Anesthesia      Spontaneous Ventilation: absent    Sedation Level:  Deep  Preoxygenated: Yes    Patient Position:  Sniffing  MILS Maintained Throughout: No    Mask Difficulty Assessment:  2 - vent by mask + OA or adjuvant +/- NMBA  Final Airway Type:  Endotracheal airway  Final Endotracheal Airway:  ETT  Cuffed: Yes    Technique Used for Successful ETT Placement:  Video laryngoscopy  Devices/Methods Used in Placement:  Anterior pressure/BURP    Insertion Site:  Oral  Blade Type:  Glide  Laryngoscope Blade/Videolaryngoscope Blade Size:  4  ETT Size (mm):  7.0  Measured from:  Lips  ETT to Lips (cm):  22  Placement Verified by: auscultation and capnometry    Cormack-Lehane Classification:  Grade I - full view of glottis  Number of Attempts at Approach:  1  Number of Other Approaches Attempted:  0

## 2023-03-01 NOTE — OP REPORT
Date of surgery: 3/1/2023    Preoperative diagnosis:  1-left complete rotator cuff tendon tear, traumatic  2-left glenoid labral tear  3-left biceps tendinitis  4-left subacromial impingement syndrome    Postoperative diagnosis:  1-left complete rotator cuff tendon tear, traumatic  2-left glenoid labral tear  3-left biceps tendinitis  4-left subacromial impingement syndrome  5-left partial-thickness superior edge of subscapularis tear    Surgery performed:  1-left diagnostic shoulder arthroscopy  2-left shoulder arthroscopy with rotator cuff tendon repair including the supraspinatus and infraspinatus tendons  3-left shoulder arthroscopy with extensive debridement  4-left shoulder arthroscopy with subacromial decompression  5-open left biceps tenodesis    Anesthesia: General, block for postoperative pain control    Surgeon: Antwon Galaviz MD    First Assist: Stefania Perea CFA    Complications: None    Estimated blood loss: 25 mL    Findings: Complete disruption of the supraspinatus and infraspinatus tendons.  Partial-thickness tearing of the subscapularis at the superior edge.  Glenoid labral tearing from the 9 o'clock position to the 2 o'clock position.  Extensive synovitis throughout the glenohumeral joint.    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  He has had persistent pain in the left shoulder.  He had significant weakness especially with overhead activities.  He has tried nonoperative treatments including physical therapy, steroid and PRP injections.  Additionally, he has MRI findings consistent with the preoperative diagnoses and for these reasons the decision was made to take him to the operating today for the above-mentioned procedure.    Description of procedure: On the date of surgery Dr. Sanches was seen in the preoperative area where informed consent was obtained with  all risks and benefits of the procedure explained and all questions answered.  He wished to proceed with the surgery.  The proper site was marked.  He was subsequently taken to the operating room and placed in the supine position with all bony promises well-padded.  General endotracheal anesthesia was induced.  He was then placed into an elevated beachchair position.  The left upper extremity was then prepped and draped in the usual sterile fashion.    A timeout was performed with all persons in attendance agreeing on the proper patient, proper surgical site and proper surgery to be performed.    A standard posterior portal was made.  The arthroscope was placed into the glenohumeral joint and a joint inspection was performed.  There is found to be significant synovitis throughout the glenohumeral joint.  There is significant fraying and tearing of the glenoid labrum from the 9 o'clock position to the 2 o'clock position.  There is also tearing extending into the proximal aspect of the biceps tendon at the biceps labral anchor.  There was complete disruption of the supraspinatus and infraspinatus tendons.  Visual inspection of the subscapularis showed partial-thickness tearing on the most superior leading edge without complete thickness tearing.    I then made a standard anterior portal.  Arthroscopic scissors were used to perform a biceps tenotomy.  I then used a combination of Bovie electrocautery and a shaver to perform an extensive debridement.  I debrided the biceps labral anchor.  I also debrided the glenoid labrum back to a stable rim.  I debrided the areas of synovitis throughout the glenohumeral joint.  I also debrided the footprint of the rotator cuff for later repair.  I also debrided the edges of the rotator cuff in preparation for direct repair.    The arthroscope was then placed into the subacromial space.  A standard lateral portal was made.  I then used a combination of shaver and Bovie electrocautery  to perform a subacromial decompression.  The CA ligament was released using Bovie electrocautery.  I then visualized the lateral aspect of the acromion as there was an osteophyte noted on his preoperative x-rays.  There did not appear to be any significant osteophyte formation and therefore I elected not to perform an acromioplasty.  Additionally, there is no significant anteroinferior subacromial osteophyte.  I was able to debride the areas of scar tissue around the rotator cuff and then used a tissue grasper to verify sufficient mobility of the tendon.  This was easily brought back to the rotator cuff footprint.    I then placed 3 Toan speed anchors from anterior to posterior.  These were then passed in an alternating fashion from anterior to posterior.  I then used a [place] ReelX anchor for each respective speed anchor to perform a lateral row repair.  I used a probe with each anchor to ensure good tensioning and compression.  This had good reapproximation of the rotator cuff tendons.  Final images were obtained.    Attention was then turned to the open biceps tenodesis.  A longitudinal incision was made in line with the anterior axillary line.  Sharp and blunt dissection was taken down to the pectoralis major tendon fascia.  This was then incised in line with the tendon.  Blunt dissection was taken to the intertubercular groove.  The biceps tendon was removed from the intertubercular groove.  A [place] [email or web address]'s anchor was then placed into the intertubercular groove.  The free end of each suture was then used to perform a [place] stitch at the musculotendinous junction.  I then used the opposite end of the suture to help bring the tendon adjacent to the humerus.  This was then secured in place using surgical knots.  This was then repeated for the second limb of the suture anchor.  Free ends of the suture and tendon were then cut to length.  The wound was then thoroughly irrigated copious amounts of normal  saline.  It was then closed using 3-0 Monocryl using a subcuticular stitch.  Steri-Strips were then placed.  The wounds were then dressed with Xeroform, 4 x 4 and Tegaderm dressings.  The left upper extremity was then placed into a shoulder immobilizer.  General endotracheal anesthesia was reversed and he was taken to the PACU in good stable condition.    Disposition: He will be discharged home on a regular diet.  He will have no active motion of the left shoulder.  He may perform pendulum exercises 3-5 times daily.  The sling may be removed for pendulum exercises and for showering only.  He may shower with the bandages in place but should not soak the wounds.  These bandages may be removed after 48 hours and he may begin to shower as normal but should not soak the wounds.  He was prescribed narcotic pain medication and instructed not to drive or operate machinery while taking this medication.  He will return to clinic in 10 to 14 days for repeat evaluation.

## 2023-03-01 NOTE — DISCHARGE INSTRUCTIONS
If any questions arise, call your provider.  If your provider is not available, please feel free to call the [facility] at [phone].    MEDICATIONS: Resume taking daily medication.  Take prescribed pain medication with food.  If no medication is prescribed, you may take non-aspirin pain medication if needed.  PAIN MEDICATION CAN BE VERY CONSTIPATING.  Take a stool softener or laxative such as senokot, pericolace, or milk of magnesia if needed.    Last pain medication given at 11:10am Oxycodone 10mg  Tylenol 1,000mg at 6:30am    What to Expect Post Anesthesia    Rest and take it easy for the first 24 hours.  A responsible adult is recommended to remain with you during that time.  It is normal to feel sleepy.  We encourage you to not do anything that requires balance, judgment or coordination.    FOR 24 HOURS DO NOT:  Drive, operate machinery or run household appliances.  Drink beer or alcoholic beverages.  Make important decisions or sign legal documents.    To avoid nausea, slowly advance diet as tolerated, avoiding spicy or greasy foods for the first day.  Add more substantial food to your diet according to your provider's instructions.  Babies can be fed formula or breast milk as soon as they are hungry.  INCREASE FLUIDS AND FIBER TO AVOID CONSTIPATION.    MILD FLU-LIKE SYMPTOMS ARE NORMAL.  YOU MAY EXPERIENCE GENERALIZED MUSCLE ACHES, THROAT IRRITATION, HEADACHE AND/OR SOME NAUSEA.    Shoulder Surgery Discharge Instructions    Keep dressings clean and intact   You may shower with the bandages in place   Do not soak the wounds   Bandages may be removed after 48 hrs and you may shower and wash your hands as normal   Apply ice as much as possible   Do not operate a vehicle or machinery while taking narcotic pain medications   Return to clinic in 10-14 days   Please call the office with any questions [phone]   No active motion of the left shoulder   No lifting >1 lb with the operative extremity   None  weight bearing to operative extremity   Please perform pendulum exercises 3-5 x/day   Begin taking the tylenol and meloxicam as prescribed starting today   Please take 1 or 2 oxycodone before bed tonight, then as needed thereafter    ACTIVITY  It is important to move your shoulder, as well as your elbow, wrist, and hand several times daily, starting the day after surgery.  You may do pendulum exercises with your operative arm starting the day after surgery.  Pendulum (dangling [place]) exercises are encouraged 2-3 times daily.  The sling will need to be removed for pendulum exercises.     NON-WEIGHT BEARING    Do not lift with your injured arm.  Do not lean into or carry anything in the injured arm.  Do not use your arm to push yourself up in bed or from a chair.  Avoid pulling and pushing with the arm on your affected side.   Follow these restrictions until cleared by your follow-up provider.     SLING / SHOULDER IMMOBILIZER:  The sling should be on at all times, except when bathing and doing your demonstrated exercises.     DRESSING AND WOUND CARE    Keep your shoulder dressing clean and dry after surgery.  Be aware that some leaking of blood or fluid from your dressing can occur and is normal. You may remove your dressing 3 days after the operation.  Notice that you have a single incision and/or several small incisions that have been closed with stitches.  Cover each of these incisions with a light dressing or band-aids.  This keeps the surgical incisions clean, as well as preventing your clothes from spotting with blood or fluid.  Change band-aids or light dressing daily.    SHOWER AND BATHING  Keep the shoulder dry for 3 days after your surgery.  Then, you may shower. You may let soap and water run over skin incisions, but do not immerse your shoulder in water.  No swimming pools, hot tubs, or baths are recommended until after your follow up and then only if cleared by your surgeon.     ICE  Apply an ice pack to  your shoulder (15 minutes on the shoulder, 15 minutes off the shoulder), as you feel necessary to help with the pain and swelling.        FOLLOW-UP  Make sure that you have an appointment 7-14 days following surgery.Your procedure/rehabilitation will be discussed and physical therapy may be prescribed at that time.

## 2023-03-01 NOTE — ANESTHESIA PREPROCEDURE EVALUATION
Case: [id number] Date/Time: 03/01/23 0715    Procedures:       LEFT SHOULDER ARTHROSCOPY FOR ROTATOR CUFF REPAIR, DEBRIDEMENT, SUBACROMIAL DECOMPRESSION AND OPEN BICEPS TENODESIS WITH REPAIRS AS INDICATED      TENODESIS, BICEPS, OPEN    Pre-op diagnosis: TRAUMATIC RUPTURE OF ROTATOR CUFF    Location: [facility] OR 02 / SURGERY [facility]    Surgeons: Antwon Galaviz M.D.          Relevant Problems   PULMONARY   (positive) Mild intermittent asthma      Other   (positive) Obesity (BMI 30-39.9)   (positive) Primary insomnia       Physical Exam    Airway   Mallampati: II  TM distance: >3 FB  Neck ROM: full       Cardiovascular - normal exam  Rhythm: regular  Rate: normal  (-) murmur     Dental - normal exam             Pulmonary - normal exam  Breath sounds clear to auscultation     Abdominal    Neurological - normal exam               Anesthesia Plan    ASA 2       Plan - general and peripheral nerve block     Peripheral nerve block will be post-op pain control  Airway plan will be ETT        Plan Factors:   Patient was not previously instructed to abstain from smoking on day of procedure.  Patient did not smoke on day of procedure.      Induction: intravenous    Postoperative Plan: Postoperative administration of opioids is intended.    Pertinent diagnostic labs and testing reviewed    Informed Consent:    Anesthetic plan and risks discussed with patient.    Use of blood products discussed with: patient whom consented to blood products.

## 2023-03-01 NOTE — OR NURSING
0552 PT TO PRE OP TO ASSUME CARE    0600 Patient allergies and NPO status verified, home medication reconciliation completed and belongings secured. Patient verbalizes understanding of pain scale, expected course of stay and plan of care. Surgical site verified with patient. IV access established. Sequentials placed on B legs

## 2023-03-01 NOTE — OR NURSING
1144: To stage ll. Up to chair and dressed w/ CNA assist. Pain is tolerable, no nausea.    1228: Home care instructions reviewed w/ pt and mother. No questions. Meets criteria for discharge.

## 2023-03-02 ASSESSMENT — PAIN SCALES - GENERAL: PAIN_LEVEL: 4

## 2023-03-02 NOTE — ANESTHESIA TIME REPORT
Anesthesia Start and Stop Event Times     Date Time Event    3/1/2023 07:20 AM Ready for Procedure    3/1/2023 07:28 AM Anesthesia Start    3/1/2023 09:59 AM Anesthesia Stop        Responsible Staff  03/01/23    Name Role Begin End    Alexis Cantu M.D. Anesthesiologist 03/01/23 07:28 AM 03/01/23 09:59 AM        Overtime Reason:  no overtime (within assigned shift)    Comments:

## 2023-03-02 NOTE — ANESTHESIA POSTPROCEDURE EVALUATION
Patient: Bassem Wilson    Procedure Summary     Date: 03/01/23 Room / Location:  OR 02 / SURGERY [facility]    Anesthesia Start: 0728 Anesthesia Stop: 0959    Procedures:       LEFT SHOULDER ARTHROSCOPY FOR ROTATOR CUFF REPAIR, DEBRIDEMENT, SUBACROMIAL DECOMPRESSION AND OPEN BICEPS TENODESIS WITH REPAIRS AS INDICATED (Left: Shoulder)      TENODESIS, BICEPS, OPEN (Left: Shoulder) Diagnosis: (TRAUMATIC RUPTURE OF ROTATOR CUFF)    Surgeons: Antwon Galaviz M.D. Responsible Provider: Alexis Cantu M.D.    Anesthesia Type: general, peripheral nerve block ASA Status: 2          Final Anesthesia Type: general, peripheral nerve block  Last vitals  BP   Blood Pressure: 107/64    Temp   36.4 °C (97.5 °F)    Pulse   92   Resp   16    SpO2   92 %      Anesthesia Post Evaluation    Patient location during evaluation: PACU  Patient participation: complete - patient participated  Level of consciousness: awake and alert  Pain score: 4    Airway patency: patent  Anesthetic complications: no  Cardiovascular status: hemodynamically stable  Respiratory status: acceptable  Hydration status: euvolemic  Comments: Pain in axilla at site of open biceps tenodesis;    PONV: none    patient able to participate, but full recovery from regional anesthesia has not occurred and is not expected within the stipulated timeframe for the completion of the evaluation      No notable events documented.     Nurse Pain Score: 4 (NPRS)

## 2023-03-17 PROCEDURE — RXMED WILLOW AMBULATORY MEDICATION CHARGE: Performed by: FAMILY MEDICINE

## 2023-03-20 ENCOUNTER — PHARMACY VISIT (OUTPATIENT)
Dept: PHARMACY | Facility: MEDICAL CENTER | Age: 38
End: 2023-03-20
Payer: COMMERCIAL

## 2023-04-14 PROCEDURE — RXMED WILLOW AMBULATORY MEDICATION CHARGE: Performed by: FAMILY MEDICINE

## 2023-04-18 ENCOUNTER — PHARMACY VISIT (OUTPATIENT)
Dept: PHARMACY | Facility: MEDICAL CENTER | Age: 38
End: 2023-04-18
Payer: COMMERCIAL

## 2023-04-26 DIAGNOSIS — J30.89 NON-SEASONAL ALLERGIC RHINITIS, UNSPECIFIED TRIGGER: ICD-10-CM

## 2023-05-01 ENCOUNTER — PHYSICAL THERAPY (OUTPATIENT)
Dept: PHYSICAL THERAPY | Facility: MEDICAL CENTER | Age: 38
End: 2023-05-01
Attending: ORTHOPAEDIC SURGERY
Payer: COMMERCIAL

## 2023-05-01 DIAGNOSIS — M75.122 COMPLETE TEAR OF LEFT ROTATOR CUFF, UNSPECIFIED WHETHER TRAUMATIC: ICD-10-CM

## 2023-05-01 PROCEDURE — 97162 PT EVAL MOD COMPLEX 30 MIN: CPT

## 2023-05-01 PROCEDURE — 97110 THERAPEUTIC EXERCISES: CPT

## 2023-05-01 ASSESSMENT — ENCOUNTER SYMPTOMS: PAIN SCALE: 3

## 2023-05-01 NOTE — OP THERAPY EVALUATION
Outpatient Physical Therapy  INITIAL EVALUATION    [facility]  [address]  [place] [address]  Phone:  [phone]  Fax:  [phone]    Date of Evaluation: 05/01/2023    Patient: Bassem Wilson  YOB: 1985  MRN: [id number]     Referring Provider: Kai Cooper M.D.  [address]  [address]  [place] [address]   Referring Diagnosis Complete rotator cuff tear or rupture of left shoulder, not specified as traumatic [M75.122]     Time Calculation    1598 0817 47 minutes         Chief Complaint: Shoulder Problem    Visit Diagnoses     ICD-10-CM   1. Complete tear of left rotator cuff, unspecified whether traumatic  M75.122       Date of onset of impairment: No data found    Subjective:   History of Present Illness:     Mechanism of injury:  Patient is a 37-year-old male with a PMH including: B elbow exploration, mild intermittent asthma, ADHD, cubital tunnel syndrome, obesity. R rotator cuff hx on R shoulder in distant past. No prev hx of L shoulder complaints.     Pt presents to therapy for surgical aftercare after he sustained L RCT from fall while snowboarding in Jan 2023. He is currently 9 weeks s/p L shoulder arthroscopy, tendon repair including both supra and infra tendons, extensive debridement, subacromial decompression and open L biceps tenodesis (3/1/23). Per surgical note, he has attempted PT and PRP injections; initially felt better but then experienced popping sensation after shoveling snow. He ultimately opted for surgical management. Pt reporting was in abd sling x 6 weeks; had some neck pain that has resolved. Pt is currently not lifting greater weight than a coffee cup. Pt was receiving PT rehab at [facility] immediately after sx; Passive treatment x 7 weeks progressing to AAROM and AROM. Was sleeping in recliner x 2 months, has recently progressed to bed and is sleeping with stacked pillows.  "Pain is currently well managed, pain-free at rest with only \"muscle soreness\" with active movement. Pt reporting has noticed some weakness at L shoulder and residual L hand weakness; of note, pt had B cubital tunnel sx in the past, LUE worse per his report. Denies n/t at BUE's since sx.     Prior level of function:  Physiatrist, indep with all ADL's and IADL's; very active  Pain:     Current pain rating:  3    At best pain ratin    Location:  L shoulder lateral and posterior shoulder    Quality:  Aching (\"muscle soreness\")    Progression:  Improving  Social Support:     Lives with:  Spouse and young children  Hand dominance:  Right  Diagnostic Tests:     Diagnostic Tests Comments:  MRI shoulder L 2/10/23  IMPRESSION:     1.  Full-thickness tear of the majority of the fibers of the supraspinatus tendon. This measures 2 cm in AP dimension and results in 4 cm of retraction.     2.  Tendinosis and partial-thickness tear of the articular surface fibers of the infraspinatus tendon.     3.  Tendinosis and partial-thickness tear of the bursal surface fibers of the subscapularis tendon.     4.  Degenerative change of the acromioclavicular joint with small inferiorly directed osteophytes.     5.  Moderate-sized inferiorly directed subacromial enthesophyte.     6.  Fraying of the posterior/superior glenoid labrum.     7.  Degenerative tearing of the anterior/inferior and posterior/inferior     8.  Mild degenerative change of the glenohumeral joint.  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  Hobbies/exercise: prev to injury: weight lifting, push ups, home crossfit style workouts, snowboarding and baseball    Is currently father to two children. Is having difficulties with assisting with child bearing such as picking up infant and other ADL's requiring " lifting.    Current hep: scaption, GH ER with stick, prone T's, prone rows  Patient Goals:     Patient goals for therapy:  Decreased pain, independence with ADLs/IADLs and return to sport/leisure activities    Past Medical History:   Diagnosis Date    Asthma     Mild intermitant    Hypogonadism in male      Past Surgical History:   Procedure Laterality Date    PB SHLDR ARTHROSCOP,SURG,W/ROTAT CUFF REPB Left 3/1/2023    Procedure: LEFT SHOULDER ARTHROSCOPY FOR ROTATOR CUFF REPAIR, DEBRIDEMENT, SUBACROMIAL DECOMPRESSION AND OPEN BICEPS TENODESIS WITH REPAIRS AS INDICATED;  Surgeon: Antwon Galaviz M.D.;  Location: SURGERY [facility];  Service: Orthopedics    OPEN BICEPS TENODESIS Left 3/1/2023    Procedure: TENODESIS, BICEPS, OPEN;  Surgeon: Antwon Galaviz M.D.;  Location: SURGERY [facility];  Service: Orthopedics    BICEPS TENDON REPAIR      ELBOW EXPLORATION Bilateral     HIP ARTHROSCOPY Bilateral     KNEE ARTHROSCOPY W/ ACL HEALING RESPONSE Left     ROTATOR CUFF REPAIR       Social History     Tobacco Use    Smoking status: Never    Smokeless tobacco: Never   Substance Use Topics    Alcohol use: Yes     Family and Occupational History     Socioeconomic History    Marital status:      Spouse name: Not on file    Number of children: Not on file    Years of education: Not on file    Highest education level: Professional school degree (e.g., MD, DDS, DVM, SERGIO)   Occupational History    Not on file       Objective     Postural Observations  Seated posture: poor    Additional Postural Observation Details  Forward head and rounded shoulders while seated on plinth without back support    Healing scars noted at L shoulder consistent with arthroscopy    Cervical Screen    Cervical range of motion within normal limits with the following exceptions: Cervical ROM Grossly WFL without pain; slight deficits in rotation B at end ranges    Palpation     Additional Palpation Details  Scar tissue feels pliable  "to touch; pt compliant with scar massage a few times per day    Active Range of Motion     Right Shoulder   Normal active range of motion  External rotation 0°: 59 degrees     Additional Active Range of Motion Details            GH AROM-assessed while seated on plinth:     Pt is R hand dominant     L shoulder AROM:   126 deg GH flexion in sitting   118 deg GH abd in sitting  15 deg ER in sitting with 0 deg GH abd     Restricted GH ER similarly in PROM seated with firm end feel   *Pt endorsing some discomfort with active motion           Scapular Mobility     Additional Scapular Mobility Details  Aberrant L scapular mobility during mid ranges with scaption-improved with cuing for correct scapular mechanics but cont'd \"pinching\" pain during elevation       Therapeutic Exercises (CPT 23569):     1. pt education, re: PT exam findings, relationship b/w t/s and GHJ mechanics for optimal motion    2. new hep as below    3. posture education, x2 min, Aberrant L scapular mobility during mid ranges with scaption-improved with cuing for scapular mechanics but cont'd \"pinching\" pain during elevation-decision to lie in hooklying for hep      Therapeutic Exercise Summary: Access Code: [email or web address]  URL: [email or web address]  Date: 05/01/2023  Prepared by: Gabe Cortés    Exercises  - Correct Seated Posture  - 7 x weekly  - Standing Shoulder Scaption  - 1 x daily - 7 x weekly - 2 sets - 10 reps (hooklying position)  - Seated Thoracic Lumbar Extension  - 2 x daily - 7 x weekly - 2 sets - 10 reps  - Seated Thoracic Flexion and Rotation with Arms Crossed  - 2 x daily - 7 x weekly - 2 sets - 10 reps    Pt performed these exercises with instruction and SPV.  Provided handout with these exercises for daily HEP.        Time-based treatments/modalities:           Assessment, Response and Plan:   Impairments: abnormal or restricted ROM, activity intolerance, difficulty performing job, impaired functional mobility, impaired " "physical strength, lacks appropriate home exercise program, limited ADL's and pain with function    Assessment details:  Pt presents to therapy for surgical aftercare after he sustained L RCT from fall while snowboarding in Jan 2023. He is currently 9 weeks s/p L shoulder arthroscopy, tendon repair including both supra and infra tendons, extensive debridement, subacromial decompression and open L biceps tenodesis (3/1/23). No red flags reported. Denies n/t at BUE's. Next follow up with Dr. Madison undetermined at this point as he is switching clinics per pt report. Exam findings suggestive of poor postural awareness, impaired GHJ ROM becky GH ER with firm end feel, aberrant L scapular mechanics with OH GHJ elevation, global weakness at L periscapular and RC typical of this stage in rehab. Pt may benefit from skilled physical therapy in order to address above impairments in order to improve QOL and return to reported ADL's.     Prognosis: good    Goals:   Short Term Goals:   1. Pt will be independent with written HEP.  2. Pt will demo all ex in clinic with correct posture with cuing <50%.   3. Pt will be able to progress to standing scaption with improved scapular mechanics consistently without \"pinching\" s/s.       Short term goal time span:  2-4 weeks      Long Term Goals:    1. Pt will be independent with written HEP.  2. Pt will have a sig improvement in Quickdash score >/= MDC of 10 pts or greater (eval: 77.27)  3. Pt will demo full AROM without scapular substitutions and aberrant motions consistently to return to ADL's including reaching overhead at 12 weeks.   4. Pt will demonstrate independence with periscapular and RC strengthening program without increase in discomfort in order to wean back into active lifestyle/weight lifting program at home.       Long term goal time span:  4-6 months    Plan:   Therapy options:  Physical therapy treatment to continue  Planned therapy interventions:  Neuromuscular " Re-education (CPT 23789), E Stim Unattended (CPT 31666), Manual Therapy (CPT 46629) and Therapeutic Exercise (CPT 09559)  Frequency: 1-2x/wk.  Duration in visits:  12  Discussed with:  Patient  Plan details:  UPOC: 11/2/23    *Visits will decrease in frequency as independence with hep increases        Functional Assessment Used:   Quickdash score: 77.27        Referring provider co-signature:  I have reviewed this plan of care and my co-signature certifies the need for services.    Certification Period: 05/01/2023 to  11/2/23    Physician Signature: ________________________________ Date: ______________

## 2023-05-02 ASSESSMENT — ENCOUNTER SYMPTOMS
PAIN SCALE AT LOWEST: 0
QUALITY: ACHING

## 2023-05-04 ENCOUNTER — PHYSICAL THERAPY (OUTPATIENT)
Dept: PHYSICAL THERAPY | Facility: MEDICAL CENTER | Age: 38
End: 2023-05-04
Attending: ORTHOPAEDIC SURGERY
Payer: COMMERCIAL

## 2023-05-04 DIAGNOSIS — M75.122 COMPLETE TEAR OF LEFT ROTATOR CUFF, UNSPECIFIED WHETHER TRAUMATIC: ICD-10-CM

## 2023-05-04 PROCEDURE — 97140 MANUAL THERAPY 1/> REGIONS: CPT

## 2023-05-04 PROCEDURE — 97110 THERAPEUTIC EXERCISES: CPT

## 2023-05-04 NOTE — OP THERAPY DAILY TREATMENT
Outpatient Physical Therapy  DAILY TREATMENT     [facility]patient [facility] Therapy  [address]  [place] [address]  Phone:  [phone]  Fax:  [phone]    Date: 05/04/2023    Patient: Bassem Wilson  YOB: 1985  MRN: [id number]     Time Calculation    Start time: 0732  Stop time: 0816 Time Calculation (min): 44 minutes         Chief Complaint: Shoulder Problem    Visit #: 2    SUBJECTIVE:  Pt reporting he is returning to prev procedures at work and has been compliant with his hep. No other new complaints. No significant soreness upon presentation to PT; stating shoulder is sore and fatigued at end of work day.    OBJECTIVE:  Current objective measures:     L shoulder AROM pre treatment:  125 deg GH flexion   127 deg GH abd  19 deg GH ER    L shoulder AROM post treatment:   135 deg GH flexion in sitting   152 deg GH abd in sitting  25 deg ER in sitting with 0 deg GH abd      Restricted GH ER similarly in PROM seated with firm end feel   *Pt endorsing some discomfort with active motion       Therapeutic Exercises (CPT 48486):     1. pt education, re: PT exam findings, relationship b/w t/s and GHJ mechanics for optimal motion    2. new hep as below    3. posture education, additional edu to avoid gaurding LUE in 'sling' position    5. t/s extension over FR, x10, core weakness with limited t/s extension ROM -improving with incr reps; modified hep from sitting>hooklying with FR at home    6. GH isometrics at wall, flex, ext, GH IR, GH ER 2-3x5 sec, hep    10. pulleys, GH flexion and GH IR, x10, warm up      Therapeutic Exercise Summary: Access Code: [email or web address]  URL: [email or web address]  Date: 05/04/2023  Prepared by: Gabe Cortés    Exercises  - Correct Seated Posture  - 7 x weekly  - Standing Shoulder Scaption  - 1 x daily - 7 x weekly - 2 sets - 10 reps  - Seated Thoracic Flexion and Rotation with Arms Crossed  - 2 x daily - 7 x weekly  "- 2 sets - 10 reps  - Thoracic Extension Mobilization on Foam Roll  - 2 x daily - 7 x weekly - 2 sets - 10 reps  - Standing Isometric Shoulder External Rotation with Doorway  - 2 x daily - 7 x weekly - 1-2 sets - 5 reps - 5 sec hold  - Standing Isometric Shoulder Internal Rotation with Towel Roll at Doorway  - 2 x daily - 7 x weekly - 1-2 sets - 5 reps - 5 sec hold  - Standing Isometric Shoulder Flexion with Doorway - Arm Bent  - 2 x daily - 7 x weekly - 1-2 sets - 5 reps - 5 sec hold  - Standing Isometric Shoulder Extension with Doorway - Arm Bent  - 2 x daily - 7 x weekly - 1-2 sets - 5 reps - 5 sec hold    Pt performed these exercises with instruction and SPV.  Provided handout with these exercises for daily HEP.        Therapeutic Treatments and Modalities:     1. Manual Therapy (CPT 53615), see below, x13 min    Therapeutic Treatment and Modalities Summary: Manual: PA gr III and inf glides gr III to L GHJ in scapular plane hooklying with increasing elevation, followed by STM to rhomboids and levator scap L then L scapular mobs gr III inf/sup, add/abd and upward rotation and scapular release x 8 in SL with pillow b/w therapist and pt for modesty  Time-based treatments/modalities:    Physical Therapy Timed Treatment Charges  Manual therapy minutes (CPT 62319): 13 minutes  Therapeutic exercise minutes (CPT 58852): 31 minutes      Pain rating (1-10) before treatment:    Pain rating (1-10) after treatment:      ASSESSMENT:   Response to treatment:   Pt is currently 9 weeks s/p L shoulder arthroscopy, tendon repair including both supra and infra tendons, extensive debridement, subacromial decompression and open L biceps tenodesis (3/1/23). Discussion with Dr. Galaviz on 5/3, per surgeon, reporting pt currently has no precautions and let \"pain be the guide.\"   Improvement in shoulder ROM following manual with decr aberrant scapular motion and visible muscle juddering during GH elevation in scapular plane. Initiation " of RC isometrics in session today. Continue manual and progress pt's AROM and gentle muscle activation program per surgeon's protocol/recommendations.        PLAN/RECOMMENDATIONS:   Plan for treatment: continue with current POC.  Planned interventions for next visit: continue with current treatment.

## 2023-05-08 ENCOUNTER — PHYSICAL THERAPY (OUTPATIENT)
Dept: PHYSICAL THERAPY | Facility: MEDICAL CENTER | Age: 38
End: 2023-05-08
Attending: FAMILY MEDICINE
Payer: COMMERCIAL

## 2023-05-08 DIAGNOSIS — M75.122 COMPLETE TEAR OF LEFT ROTATOR CUFF, UNSPECIFIED WHETHER TRAUMATIC: ICD-10-CM

## 2023-05-08 PROCEDURE — 97140 MANUAL THERAPY 1/> REGIONS: CPT

## 2023-05-08 PROCEDURE — 97110 THERAPEUTIC EXERCISES: CPT

## 2023-05-08 NOTE — OP THERAPY DAILY TREATMENT
"  Outpatient Physical Therapy  DAILY TREATMENT     [facility] Outpatient Physical Therapy  [address]  [place] [address]  Phone:  [phone]  Fax:  [phone]    Date: 05/08/2023    Patient: Bassem Wilson  YOB: 1985  MRN: [id number]     Time Calculation    Start time: 0730  Stop time: 0815 Time Calculation (min): 45 minutes         Chief Complaint: Shoulder Problem    Visit #: 3    SUBJECTIVE:  Pt reporting he was not overly sore from last session. Stating he still gets fatigued when he does a lot of procedures at work.        OBJECTIVE:  Current objective measures:     L shoulder AROM pre treatment:  140 deg GH flexion   152 deg GH abd  21 deg GH ER    Lumbar lordosis in standing  Hypomobile t/s in sagittal plane      Therapeutic Exercises (CPT 25270):     2. new hep as below    3. posture education, x1 min, reviewed    5. t/s extension over FR, x10, reviewed; core weakness with limited t/s extension ROM -improving with incr reps; modified hep from sitting>hooklying with FR at home    6. GH isometrics at wall, flex, ext, GH IR, GH ER 2-3x5 sec, verbal review only    10. pulleys, GH flexion and GH IR, x10, warm up    11. Qped scapular protraction with a plus, x20, muscle juddering during eccentric portion; \"muscle fatigue\"    12. rows, orange TB, x 15 with 2-3 sec hold, hep    13. pull downs, orange TB, x 15 with 2-3 sec hold, hep      Therapeutic Exercise Summary: Access Code: [email or web address]  URL: [email or web address]  Date: 05/08/2023  Prepared by: Gabe Cortés    Exercises  - Correct Seated Posture  - 7 x weekly  - Standing Shoulder Scaption  - 1 x daily - 7 x weekly - 2 sets - 10 reps  - Seated Thoracic Flexion and Rotation with Arms Crossed  - 2 x daily - 7 x weekly - 2 sets - 10 reps  - Thoracic Extension Mobilization on Foam Roll  - 2 x daily - 7 x weekly - 2 sets - 10 reps  - Standing Isometric Shoulder External Rotation with Doorway  " "- 2 x daily - 7 x weekly - 1-2 sets - 5 reps - 5 sec hold  - Standing Isometric Shoulder Internal Rotation with Towel Roll at Doorway  - 2 x daily - 7 x weekly - 1-2 sets - 5 reps - 5 sec hold  - Standing Isometric Shoulder Flexion with Doorway - Arm Bent  - 2 x daily - 7 x weekly - 1-2 sets - 5 reps - 5 sec hold  - Standing Isometric Shoulder Extension with Doorway - Arm Bent  - 2 x daily - 7 x weekly - 1-2 sets - 5 reps - 5 sec hold  - Standing Row with Resistance  - 3 x weekly - 2 sets - 10-15 reps - 2-3 sec hold  - Lat pull downs  - 3 x weekly - 2 sets - 10-15 reps - 2-3 sec hold    Pt performed these exercises with instruction and SPV.  Provided handout with these exercises for daily HEP.        Therapeutic Treatments and Modalities:     1. Manual Therapy (CPT 50625), see below, x13 min    Therapeutic Treatment and Modalities Summary: Manual: PA gr III and inf glides gr III to L GHJ in scapular plane hooklying with increasing elevation, followed by STM to rhomboids and levator scap L then L scapular mobs gr III inf/sup, add/abd and upward rotation and scapular release x 8 in SL with pillow b/w therapist and pt for modesty  Time-based treatments/modalities:    Physical Therapy Timed Treatment Charges  Manual therapy minutes (CPT 88870): 13 minutes  Therapeutic exercise minutes (CPT 51414): 32 minutes      Pain rating (1-10) before treatment:    Pain rating (1-10) after treatment:      ASSESSMENT:   Response to treatment:   Pt is currently 10 weeks s/p L shoulder arthroscopy, tendon repair including both supra and infra tendons, extensive debridement, subacromial decompression and open L biceps tenodesis (3/1/23). Discussion with Dr. Galaviz on 5/3, per surgeon, reporting pt currently has no precautions and let \"pain be the guide.\"     Maintaining improvements at shoulder ROM from prev session; ER ROM at 0 deg abd is most restricted-improved today following manual release to scapula. Cont'd improvements in " aberrant scapular mechanics but noting most impairments in mid range due to weakness at RC. Suspect this will continue to improve with isometrics and progression to strengthening in upcoming weeks. Continue treatment per protocol.      PLAN/RECOMMENDATIONS:   Plan for treatment: continue with current POC.  Planned interventions for next visit: continue with current treatment.  Continue proprioception and periscapular strengthening program  Introduce t/s rotational ex  Initiate core and trunk stab/power due to relationship with shoulder power/strength

## 2023-05-11 ENCOUNTER — PHYSICAL THERAPY (OUTPATIENT)
Dept: PHYSICAL THERAPY | Facility: MEDICAL CENTER | Age: 38
End: 2023-05-11
Attending: FAMILY MEDICINE
Payer: COMMERCIAL

## 2023-05-11 DIAGNOSIS — M75.122 COMPLETE TEAR OF LEFT ROTATOR CUFF, UNSPECIFIED WHETHER TRAUMATIC: ICD-10-CM

## 2023-05-11 PROCEDURE — 97140 MANUAL THERAPY 1/> REGIONS: CPT

## 2023-05-11 PROCEDURE — 97110 THERAPEUTIC EXERCISES: CPT

## 2023-05-11 NOTE — OP THERAPY DAILY TREATMENT
"  Outpatient Physical Therapy  DAILY TREATMENT     [facility] Outpatient Physical Therapy  [address]  [place] [address]  Phone:  [phone]  Fax:  [phone]    Date: 05/11/2023    Patient: Bassem Wilson  YOB: 1985  MRN: [id number]     Time Calculation    Start time: 0730  Stop time: 0813 Time Calculation (min): 43 minutes         Chief Complaint: Shoulder Problem    Visit #: 4    SUBJECTIVE:  Pt reporting feels some fatigue after procedures at work due to maintained arm positions and following PT; denies sharp pains at this time. Notices improvement with incr scapular mobility.      OBJECTIVE:  Current objective measures:     L shoulder AROM pre treatment:  145 deg GH flexion   153 deg GH abd  28 deg GH ER    Lumbar lordosis  and compensations prone on ball  Hypomobile t/s in sagittal and transverse planes    L shoulder AROM post treatment:  150 deg GH flexion   155 deg GH abd  32 deg GH ER      Therapeutic Exercises (CPT 02593):     2. hep as below    3. posture education, x1 min, reviewed    5. t/s extension over FR, x10, NT    6. GH isometrics at wall, flex, ext, GH IR, GH ER 2-3x5 sec, NT    9. t/s rotation in SL, restricted to L; WFL to R    10. pulleys, GH flexion and GH IR, x10, warm up    11. Qped scapular protraction with a plus, x20, muscle juddering during eccentric portion; \"muscle fatigue\"    12. rows, orange TB, x 15 with 2-3 sec hold, NT    13. pull downs, orange TB, x 15 with 2-3 sec hold, NT    14. ball bridge+alt GH scaption, 2x10, fatigue following; progressed hep    15. Ball plank WITYs, 6 rounds, fatigue with compensations at l/s; improved with tactile and verbal cuing for TrA engagement; progressed hep      Therapeutic Exercise Summary: Access Code: [email or web address]  URL: [email or web address]  Date: 05/11/2023  Prepared by: Gabe Cortés    Exercises  - Correct Seated Posture  - 7 x weekly  - Standing Shoulder Scaption  - " 1 x daily - 7 x weekly - 2 sets - 10 reps  - Seated Thoracic Flexion and Rotation with Arms Crossed  - 2 x daily - 7 x weekly - 2 sets - 10 reps  - Thoracic Extension Mobilization on Foam Roll  - 2 x daily - 7 x weekly - 2 sets - 10 reps  - Standing Isometric Shoulder External Rotation with Doorway  - 2 x daily - 7 x weekly - 1-2 sets - 5 reps - 5 sec hold  - Standing Isometric Shoulder Internal Rotation with Towel Roll at Doorway  - 2 x daily - 7 x weekly - 1-2 sets - 5 reps - 5 sec hold  - Standing Isometric Shoulder Flexion with Doorway - Arm Bent  - 2 x daily - 7 x weekly - 1-2 sets - 5 reps - 5 sec hold  - Standing Isometric Shoulder Extension with Doorway - Arm Bent  - 2 x daily - 7 x weekly - 1-2 sets - 5 reps - 5 sec hold  - Standing Row with Resistance  - 3 x weekly - 2 sets - 10-15 reps - 2-3 sec hold  - Lat pull downs  - 3 x weekly - 2 sets - 10-15 reps - 2-3 sec hold  - Bridge with Arms Out and Feet on Swiss Ball  - 3 x weekly - 3 sets - 10-15 reps  - WITY's  - 3-4 x weekly - 2-3 sets - 10-15 reps    Pt performed these exercises with instruction and SPV.  Provided handout with these exercises for daily HEP.        Therapeutic Treatments and Modalities:     1. Manual Therapy (CPT 63422), see below, x13 min    Therapeutic Treatment and Modalities Summary: Manual: PA gr III and inf glides gr III to L GHJ in scapular plane hooklying with increasing elevation, followed by STM to rhomboids and levator scap L then L scapular mobs gr III inf/sup, add/abd and upward rotation and scapular release x 8 in SL with pillow b/w therapist and pt for modesty    Time-based treatments/modalities:    Physical Therapy Timed Treatment Charges  Manual therapy minutes (CPT 21559): 13 minutes  Therapeutic exercise minutes (CPT 01192): 30 minutes      Pain rating (1-10) before treatment:    Pain rating (1-10) after treatment:      ASSESSMENT:   Response to treatment:   Pt is currently 10 weeks s/p L shoulder arthroscopy, tendon  "repair including both supra and infra tendons, extensive debridement, subacromial decompression and open L biceps tenodesis (3/1/23). Discussion with Dr. Galaviz on 5/3, per surgeon, reporting pt currently has no precautions and let \"pain be the guide.\"     Scapular and t/s mobility improving but cont'd noted restrictions. T/s hypomobile in both sagittal and transverse planes; updated hep with rotational mobs to improve biomechanics of shoulder elevation. Improved mid range motion, noted smoothness and decreased aberrant nature during PROM assessment into scaption. Progression of pt's hep in session to incorporate core challenges for global strengthening and address LBP hx. Will continue to progress hep and manual as tissue healing time line and protocol allow.      PLAN/RECOMMENDATIONS:   Plan for treatment: continue with current POC.  Planned interventions for next visit: continue with current treatment.  Assess response to updated/progressed hep.       "

## 2023-05-15 ENCOUNTER — PHYSICAL THERAPY (OUTPATIENT)
Dept: PHYSICAL THERAPY | Facility: MEDICAL CENTER | Age: 38
End: 2023-05-15
Attending: FAMILY MEDICINE
Payer: COMMERCIAL

## 2023-05-15 DIAGNOSIS — M75.122 COMPLETE TEAR OF LEFT ROTATOR CUFF, UNSPECIFIED WHETHER TRAUMATIC: ICD-10-CM

## 2023-05-15 PROCEDURE — 97110 THERAPEUTIC EXERCISES: CPT

## 2023-05-15 PROCEDURE — 97140 MANUAL THERAPY 1/> REGIONS: CPT

## 2023-05-15 NOTE — OP THERAPY DAILY TREATMENT
Outpatient Physical Therapy  DAILY TREATMENT     [facility] Outpatient Physical Therapy  [address]  [place] [address]  Phone:  [phone]  Fax:  [phone]    Date: 05/15/2023    Patient: Bassem Wilson  YOB: 1985  MRN: [id number]     Time Calculation    Start time: 0733  Stop time: 0811 Time Calculation (min): 38 minutes         Chief Complaint: Shoulder Problem    Visit #: 5    SUBJECTIVE:  Pt reporting was very sore over the weekend.States it was not his rotator cuff but he was more sore in his serratus and surrounding muscles.  States he feels better today; just stiff.       OBJECTIVE:  Current objective measures:     L shoulder AROM pre treatment:  149 deg GH flexion   157 deg GH abd  30 deg GH ER    Lumbar lordosis  and compensations prone on ball  Hypomobile t/s in sagittal and transverse planes      Therapeutic Exercises (CPT 98368):     2. hep as below    4. FR open book pec stretch, alt GH flexion, scaption, SA punches, x15    9. t/s rotation in SL, x10 to L, restricted to L-improving    10. pulleys, GH flexion and GH IR, x10, warm up    11. Qped scapular protraction with a plus->single arm flexion, x20, NT    13. pt education, re: research online for drug side effects/interactions due to shaking complaints at hands      Therapeutic Exercise Summary: Access Code: [email or web address]  URL: [email or web address]  Date: 05/11/2023  Prepared by: Gabe Cortés    Exercises  - Correct Seated Posture  - 7 x weekly  - Standing Shoulder Scaption  - 1 x daily - 7 x weekly - 2 sets - 10 reps  - Seated Thoracic Flexion and Rotation with Arms Crossed  - 2 x daily - 7 x weekly - 2 sets - 10 reps  - Thoracic Extension Mobilization on Foam Roll  - 2 x daily - 7 x weekly - 2 sets - 10 reps  - Standing Isometric Shoulder External Rotation with Doorway  - 2 x daily - 7 x weekly - 1-2 sets - 5 reps - 5 sec hold  - Standing Isometric Shoulder Internal  "Rotation with Towel Roll at Doorway  - 2 x daily - 7 x weekly - 1-2 sets - 5 reps - 5 sec hold  - Standing Isometric Shoulder Flexion with Doorway - Arm Bent  - 2 x daily - 7 x weekly - 1-2 sets - 5 reps - 5 sec hold  - Standing Isometric Shoulder Extension with Doorway - Arm Bent  - 2 x daily - 7 x weekly - 1-2 sets - 5 reps - 5 sec hold  - Standing Row with Resistance  - 3 x weekly - 2 sets - 10-15 reps - 2-3 sec hold  - Lat pull downs  - 3 x weekly - 2 sets - 10-15 reps - 2-3 sec hold  - Bridge with Arms Out and Feet on Swiss Ball  - 3 x weekly - 3 sets - 10-15 reps  - WITY's  - 3-4 x weekly - 2-3 sets - 10-15 reps    Pt performed these exercises with instruction and SPV.  Provided handout with these exercises for daily HEP.        Therapeutic Treatments and Modalities:     1. Manual Therapy (CPT 37023), see below, x13 min    Therapeutic Treatment and Modalities Summary: Manual:  STM to rhomboids and levator scap L then L scapular mobs gr III inf/sup, add/abd and upward rotation and scapular release x 8 in SL with pillow b/w therapist and pt for modesty    Time-based treatments/modalities:    Physical Therapy Timed Treatment Charges  Manual therapy minutes (CPT 62605): 8 minutes  Therapeutic exercise minutes (CPT 50617): 30 minutes      Pain rating (1-10) before treatment:    Pain rating (1-10) after treatment:      ASSESSMENT:   Response to treatment:   Pt is currently 11 weeks s/p L shoulder arthroscopy, tendon repair including both supra and infra tendons, extensive debridement, subacromial decompression and open L biceps tenodesis (3/1/23). Discussion with Dr. Galaviz on 5/3, per surgeon, reporting pt currently has no precautions and let \"pain be the guide.\"     GHJ ROM continues to improve with improved scapulohumeral rhythm. Pt noting incr soreness at last session, therefore slight regression to FR activities within session with good tolerance. GH ER ROM continues to be most restricted out of all motions, " anticipate this will continue to improve with strengthening program. Due to slower soft tissue to bone healing, discussion to continue monitoring weight lifting (biceps tenodesis). Will continue per protocol and pt's pain tolerance.    PLAN/RECOMMENDATIONS:   Plan for treatment: continue with current POC.  Planned interventions for next visit: continue with current treatment.  Assess response to updated/progressed hep.

## 2023-05-18 ENCOUNTER — PHYSICAL THERAPY (OUTPATIENT)
Dept: PHYSICAL THERAPY | Facility: MEDICAL CENTER | Age: 38
End: 2023-05-18
Attending: FAMILY MEDICINE
Payer: COMMERCIAL

## 2023-05-18 DIAGNOSIS — M75.122 COMPLETE TEAR OF LEFT ROTATOR CUFF, UNSPECIFIED WHETHER TRAUMATIC: ICD-10-CM

## 2023-05-18 PROCEDURE — 97110 THERAPEUTIC EXERCISES: CPT

## 2023-05-18 PROCEDURE — 97140 MANUAL THERAPY 1/> REGIONS: CPT

## 2023-05-18 NOTE — OP THERAPY DAILY TREATMENT
Outpatient Physical Therapy  DAILY TREATMENT     [facility] Outpatient Physical Therapy  [address]  [place] [address]  Phone:  [phone]  Fax:  [phone]    Date: 05/18/2023    Patient: Bassem Wilson  YOB: 1985  MRN: [id number]     Time Calculation    Start time: 0735  Stop time: 0814 Time Calculation (min): 39 minutes         Chief Complaint: Shoulder Problem    Visit #: 6    SUBJECTIVE:  Pt reporting shoulder is not as sore today, states he is very focused on his posture at work and mobilizing his t/s has been helpful.       OBJECTIVE:  Current objective measures:     L shoulder AROM post treatment:  148 deg GH flexion   158 deg GH abd  34 deg GH ER    Lumbar lordosis  and compensations prone on ball  Hypomobile t/s in sagittal and transverse planes      Therapeutic Exercises (CPT 37047):     2. hep as below    4. FR open book pec stretch, alt GH flexion, scaption, SA punches, x15, NT    9. t/s rotation in SL, x10 to L, verbal review    10. pulleys, GH flexion and GH IR, x5 min, warm up    11. Qped scapular protraction with a plus, x20, NT    20. UPOC: 6/1/23      Therapeutic Exercise Summary: Access Code: [email or web address]  URL: [email or web address]  Date: 05/11/2023  Prepared by: Gabe Cortés    Exercises  - Correct Seated Posture  - 7 x weekly  - Standing Shoulder Scaption  - 1 x daily - 7 x weekly - 2 sets - 10 reps  - Seated Thoracic Flexion and Rotation with Arms Crossed  - 2 x daily - 7 x weekly - 2 sets - 10 reps  - Thoracic Extension Mobilization on Foam Roll  - 2 x daily - 7 x weekly - 2 sets - 10 reps  - Standing Isometric Shoulder External Rotation with Doorway  - 2 x daily - 7 x weekly - 1-2 sets - 5 reps - 5 sec hold  - Standing Isometric Shoulder Internal Rotation with Towel Roll at Doorway  - 2 x daily - 7 x weekly - 1-2 sets - 5 reps - 5 sec hold  - Standing Isometric Shoulder Flexion with Doorway - Arm Bent  - 2 x daily  "- 7 x weekly - 1-2 sets - 5 reps - 5 sec hold  - Standing Isometric Shoulder Extension with Doorway - Arm Bent  - 2 x daily - 7 x weekly - 1-2 sets - 5 reps - 5 sec hold  - Standing Row with Resistance  - 3 x weekly - 2 sets - 10-15 reps - 2-3 sec hold  - Lat pull downs  - 3 x weekly - 2 sets - 10-15 reps - 2-3 sec hold  - Bridge with Arms Out and Feet on Swiss Ball  - 3 x weekly - 3 sets - 10-15 reps  - WITY's  - 3-4 x weekly - 2-3 sets - 10-15 reps    Pt performed these exercises with instruction and SPV.  Provided handout with these exercises for daily HEP.        Therapeutic Treatments and Modalities:     1. Manual Therapy (CPT 33312), see below, x21 min    Therapeutic Treatment and Modalities Summary: Manual: Double lacrosse balls for STM at periscapular region followed by CPA and rotational mobs in prone gr III from CTJ to T9/10 then PA to GHJ L and inferior glides in hooklying    Time-based treatments/modalities:    Physical Therapy Timed Treatment Charges  Manual therapy minutes (CPT 71116): 21 minutes  Therapeutic exercise minutes (CPT 77676): 18 minutes      Pain rating (1-10) before treatment:    Pain rating (1-10) after treatment:      ASSESSMENT:   Response to treatment:   Pt is currently 11 weeks s/p L shoulder arthroscopy, tendon repair including both supra and infra tendons, extensive debridement, subacromial decompression and open L biceps tenodesis (3/1/23). Discussion with Dr. Galaviz on 5/3, per surgeon, reporting pt currently has no precautions and let \"pain be the guide.\"     GHJ ROM maintained from last session without any significant increases in session. Hypomobile t/s throughout. Cont'd intermittent poor postural awareness with forward head and rounded shoulders, suspect there may be underlying tightness at pecs, potentially hypomobile at first rib, SCJ and ACJ -this to be assessed in next session. Will initiate gentle strengthening program to RC in addition to continuation of " periscapular strengthening per standard protocol with mindfulness to pt's pain tolerance and s/s.     PLAN/RECOMMENDATIONS:   Plan for treatment: continue with current POC.  Planned interventions for next visit: continue with current treatment.  Assess L pec, SCJ, 1st rib, and ACJ for hypomobility/deficits; initiate gentle strengthening involving RC musculature to tolerance, continue periscap strengthening

## 2023-05-22 ENCOUNTER — PHYSICAL THERAPY (OUTPATIENT)
Dept: PHYSICAL THERAPY | Facility: MEDICAL CENTER | Age: 38
End: 2023-05-22
Attending: FAMILY MEDICINE
Payer: COMMERCIAL

## 2023-05-22 DIAGNOSIS — M75.122 COMPLETE TEAR OF LEFT ROTATOR CUFF, UNSPECIFIED WHETHER TRAUMATIC: ICD-10-CM

## 2023-05-22 PROCEDURE — 97110 THERAPEUTIC EXERCISES: CPT

## 2023-05-22 PROCEDURE — 97140 MANUAL THERAPY 1/> REGIONS: CPT

## 2023-05-22 NOTE — OP THERAPY DAILY TREATMENT
"  Outpatient Physical Therapy  DAILY TREATMENT     [facility] Outpatient Physical Therapy  [address]  [place] [address]  Phone:  [phone]  Fax:  [phone]    Date: 05/22/2023    Patient: Bassem Wilson  YOB: 1985  MRN: [id number]     Time Calculation    Start time: 0733  Stop time: 0815 Time Calculation (min): 42 minutes         Chief Complaint: Shoulder Problem    Visit #: 7    SUBJECTIVE:  Pt reporting shoulder feels good today. Stating exercises are going well, notices muscles can get \"woken\" up after being worked.       OBJECTIVE:  Current objective measures:       L shoulder AROM pre treatment:  146 deg GH flexion   159 deg GH abd  32 deg GH ER    Lumbar lordosis  and compensations prone on ball  Hypomobile t/s in sagittal and transverse planes  Hypomobile L pec, levator scap    L shoulder AROM post treatment:  158 deg GH flexion   160 deg GH abd  32 deg GH ER      Therapeutic Exercises (CPT 86604):     2. hep as below    4. FR open book pec stretch, alt GH flexion, scaption, SA punches, x15, NT    9. t/s rotation in SL, x10 to L, verbal review    10. pulleys, GH flexion and GH IR, x5 min, warm up    11. Qped scapular protraction with a plus, x20, NT    12. pec stretch at wall, x30 sec, limited ROM at LUE; hep    20. UPOC: 6/1/23      Therapeutic Exercise Summary: Access Code: [email or web address]  URL: [email or web address]  Date: 05/22/2023  Prepared by: Gabe Cortés    Exercises  - Correct Seated Posture  - 7 x weekly  - Standing Shoulder Scaption  - 1 x daily - 7 x weekly - 2 sets - 10 reps  - Seated Thoracic Flexion and Rotation with Arms Crossed  - 2 x daily - 7 x weekly - 2 sets - 10 reps  - Thoracic Extension Mobilization on Foam Roll  - 2 x daily - 7 x weekly - 2 sets - 10 reps  - Standing Isometric Shoulder External Rotation with Doorway  - 2 x daily - 7 x weekly - 1-2 sets - 5 reps - 5 sec hold  - Standing Isometric Shoulder " "Internal Rotation with Towel Roll at Doorway  - 2 x daily - 7 x weekly - 1-2 sets - 5 reps - 5 sec hold  - Standing Isometric Shoulder Flexion with Doorway - Arm Bent  - 2 x daily - 7 x weekly - 1-2 sets - 5 reps - 5 sec hold  - Standing Isometric Shoulder Extension with Doorway - Arm Bent  - 2 x daily - 7 x weekly - 1-2 sets - 5 reps - 5 sec hold  - Standing Row with Resistance  - 3 x weekly - 2 sets - 10-15 reps - 2-3 sec hold  - Lat pull downs  - 3 x weekly - 2 sets - 10-15 reps - 2-3 sec hold  - Bridge with Arms Out and Feet on Swiss Ball  - 3 x weekly - 3 sets - 10-15 reps  - WITY's  - 3-4 x weekly - 2-3 sets - 10-15 reps  - Doorway Pec Stretch at 90 Degrees Abduction  - 2 x daily - 7 x weekly - 3 sets - 30 sec hold    Pt performed these exercises with instruction and SPV.  Provided handout with these exercises for daily HEP.        Therapeutic Treatments and Modalities:     1. Manual Therapy (CPT 92114), see below, x31 min    Therapeutic Treatment and Modalities Summary: Manual: inf first rib depression gr III. STM to pec, teres major, subscap and lat dorsi L, then STM to levator scap L and rhomboids followed by manual upward rotation mobs and scapular release L x10 gr III    Time-based treatments/modalities:    Physical Therapy Timed Treatment Charges  Manual therapy minutes (CPT 59918): 31 minutes  Therapeutic exercise minutes (CPT 73458): 11 minutes      Pain rating (1-10) before treatment:    Pain rating (1-10) after treatment:        ASSESSMENT:   Response to treatment:   Pt is currently 12 weeks s/p L shoulder arthroscopy, tendon repair including both supra and infra tendons, extensive debridement, subacromial decompression and open L biceps tenodesis (3/1/23). Discussion with Dr. Galaviz on 5/3, per surgeon, reporting pt currently has no precautions and let \"pain be the guide.\"     Improved L GHJ flexion ROM today following manual to area. Restricted pec musculature L with updated hep to start " stretching several times per day at wall for simultaneous improvements for GH ER ROM restrictions. Will continue working on pec tissue lengthening, scapular and t/s mobilization and introduce progression of periscapular strengthening next session for shoulder power.       PLAN/RECOMMENDATIONS:   Plan for treatment: continue with current POC.  Planned interventions for next visit: continue with current treatment.  Assess L pec, SCJ, 1st rib, and ACJ for hypomobility/deficits; initiate gentle strengthening involving RC musculature to tolerance, continue periscap strengthening

## 2023-05-24 NOTE — OP THERAPY DAILY TREATMENT
Outpatient Physical Therapy  DAILY TREATMENT     [facility] Outpatient Physical Therapy  [address]  [place] [address]  Phone:  [phone]  Fax:  [phone]    Date: 05/25/2023    Patient: Bassem Wilson  YOB: 1985  MRN: [id number]     Time Calculation    Start time: 0733  Stop time: 0807 Time Calculation (min): 34 minutes         Chief Complaint: Shoulder Problem    Visit #: 8    SUBJECTIVE:  Pt reporting feeling soreness after procedure days, hasn't noticed significant changes as he has been ramping up on amt of procedures.       OBJECTIVE:  Current objective measures:       L shoulder AROM pre treatment:  154 deg GH flexion   162 deg GH abd  40 deg GH ER    Lumbar lordosis  and compensations prone on ball  Hypomobile t/s in sagittal and transverse planes  Hypomobile L pec, levator scap    L shoulder AROM post treatment:  150 deg GH flexion   156 deg GH abd  35 deg GH ER      Therapeutic Exercises (CPT 84954):     2. hep as below    4. FR open book pec stretch, alt GH flexion, t/s extensions over FR, x15, reviewed; limited 90/90 position on L    9. t/s rotation in SL, x10 to L, NT    10. pulleys, GH flexion and GH IR, x5 min, NT    12. pec stretch at wall, x30 sec, verbal review to continue daily 2-3x/day    13. Qped protraction+alt GH flexion, x10 with 2 sec hold, fatigue; incr muscle juddering LUE    14. seated flasher, orange TB, x2 min, endurance hold; fatigue    15. pt education, re: rest days for strengthening program    20. UPOC: 6/1/23      Therapeutic Exercise Summary: Access Code: [email or web address]  URL: [email or web address]  Date: 05/25/2023  Prepared by: Gabe Cortés    Exercises  - Correct Seated Posture  - 7 x weekly  - Standing Shoulder Scaption  - 1 x daily - 7 x weekly - 2 sets - 10 reps  - Thoracic Extension Mobilization on Foam Roll  - 2 x daily - 7 x weekly - 2 sets - 10 reps  - Standing Row with Resistance  - 3 x weekly -  "2 sets - 10-15 reps - 2-3 sec hold  - Lat pull downs  - 3 x weekly - 2 sets - 10-15 reps - 2-3 sec hold  - Bridge with Arms Out and Feet on Swiss Ball  - 3 x weekly - 3 sets - 10-15 reps  - WITY's  - 3-4 x weekly - 2-3 sets - 10-15 reps  - Doorway Pec Stretch at 90 Degrees Abduction  - 2-3 x daily - 7 x weekly - 3 sets - 30 sec hold  - Shoulder External Rotation and Scapular Retraction with Resistance  - 3 x weekly - 1 sets - 2 min or endurance hold  - Quadruped Alternating Shoulder Flexion  - 3 x weekly - 1 sets - 10 reps  - Sidelying Thoracic Lumbar Rotation  - 2 x daily - 7 x weekly - 1-2 sets - 10 reps    Pt performed these exercises with instruction and SPV.  Provided handout with these exercises for daily HEP.        Therapeutic Treatments and Modalities:     1. Manual Therapy (CPT 53057), see below, x8 min    Therapeutic Treatment and Modalities Summary: Manual: STM to levator scap L and rhomboids followed by manual upward rotation mobs and scapular release L x10 gr III    Time-based treatments/modalities:    Physical Therapy Timed Treatment Charges  Manual therapy minutes (CPT 70399): 8 minutes  Therapeutic exercise minutes (CPT 13134): 26 minutes      Pain rating (1-10) before treatment:    Pain rating (1-10) after treatment:        ASSESSMENT:   Response to treatment:   Pt is currently 12 weeks s/p L shoulder arthroscopy, tendon repair including both supra and infra tendons, extensive debridement, subacromial decompression and open L biceps tenodesis (3/1/23). Discussion with Dr. Galaviz on 5/3, per surgeon, reporting pt currently has no precautions and let \"pain be the guide.\"     Pt has maintained L GHJ flexion ROM improvements from last session following manual to pec region with even more sig changes in GH ER (0 deg GH abd). Demonstrates continued pec muscle mobility deficits as observed with open book ex on FR; will continue to be addressed within sessions and with pec stretching as daily hep. " Continuation of periscapular and gentle RC activation strengthening with fatigue and visible muscle juddering within session. Discussion re: rest breaks and ex 3x/wk for strengthening to tolerance; fatigue ok but no pain increase; pt verbalizing understanding.        PLAN/RECOMMENDATIONS:   Plan for treatment: continue with current POC.  Planned interventions for next visit: continue with current treatment.  Progress report due next session  Discuss massage gun/tennis ball to pec to assist with soft tissue release  Review hep for carryover/symptoms

## 2023-05-25 ENCOUNTER — PHYSICAL THERAPY (OUTPATIENT)
Dept: PHYSICAL THERAPY | Facility: MEDICAL CENTER | Age: 38
End: 2023-05-25
Attending: FAMILY MEDICINE
Payer: COMMERCIAL

## 2023-05-25 DIAGNOSIS — M75.122 COMPLETE TEAR OF LEFT ROTATOR CUFF, UNSPECIFIED WHETHER TRAUMATIC: ICD-10-CM

## 2023-05-25 PROCEDURE — 97110 THERAPEUTIC EXERCISES: CPT

## 2023-05-31 DIAGNOSIS — M25.561 RIGHT ANTERIOR KNEE PAIN: ICD-10-CM

## 2023-06-01 ENCOUNTER — PHYSICAL THERAPY (OUTPATIENT)
Dept: PHYSICAL THERAPY | Facility: MEDICAL CENTER | Age: 38
End: 2023-06-01
Attending: FAMILY MEDICINE
Payer: COMMERCIAL

## 2023-06-01 DIAGNOSIS — M75.122 COMPLETE TEAR OF LEFT ROTATOR CUFF, UNSPECIFIED WHETHER TRAUMATIC: ICD-10-CM

## 2023-06-01 PROCEDURE — 97110 THERAPEUTIC EXERCISES: CPT

## 2023-06-01 PROCEDURE — 97140 MANUAL THERAPY 1/> REGIONS: CPT

## 2023-06-01 NOTE — OP THERAPY DAILY TREATMENT
Outpatient Physical Therapy  DAILY TREATMENT     [facility]  [address]  [place] [address]  Phone:  [phone]  Fax:  [phone]    Date: 06/01/2023    Patient: Bassem Wilson  YOB: 1985  MRN: [id number]     Time Calculation    Start time: 0733  Stop time: 0816 Time Calculation (min): 43 minutes         Chief Complaint: Shoulder Problem    Visit #: 9    SUBJECTIVE:  Pt reporting feeling soreness after procedure days, hasn't noticed significant changes as he has been ramping up on amt of procedures.       OBJECTIVE:  Current objective measures:       L shoulder AROM pre treatment:  155 deg GH flexion   167 deg GH abd  39 deg GH ER    Lumbar lordosis  and compensations prone on ball  Hypomobile t/s in sagittal and transverse planes  Hypomobile L pec, levator scap    L shoulder AROM post treatment:  155 deg GH flexion   165 deg GH abd  40 deg GH ER      Therapeutic Exercises (CPT 27072):     2. hep as below    9. t/s rotation in SL, x10 to L, discussed performing at wall for standing option    10. pulleys, GH flexion and GH IR, x5 min, warm up    12. pec stretch at wall, x30 sec, verbal review    13. Qped WS fwd/bwd->protraction+alt GH flexion, x10 with 2 sec hold, fatigue; incr muscle juddering LUE in WB    14. seated flasher, orange->pink TB, x2 min, endurance hold; fatigue    16. sleeper stretch, x60 sec at 45 deg then active stretch x10 at 90 deg in L SL, hep    17. W's at wall, x10, fatigue    20. UPOC: 6/1/23      Therapeutic Exercise Summary: Access Code: [email or web address]  URL: [email or web address]  Date: 06/01/2023  Prepared by: Gabe Cortés    Exercises  - Correct Seated Posture  - 7 x weekly  - Standing Shoulder Scaption  - 1 x daily - 7 x weekly - 2 sets - 10 reps  - Thoracic Extension Mobilization on Foam Roll  - 2 x daily - 7 x weekly - 2 sets - 10 reps  - Standing Row with Resistance  - 3 x weekly - 2  "sets - 10-15 reps - 2-3 sec hold  - Lat pull downs  - 3 x weekly - 2 sets - 10-15 reps - 2-3 sec hold  - Bridge with Arms Out and Feet on Swiss Ball  - 3 x weekly - 3 sets - 10-15 reps  - WITY's  - 3-4 x weekly - 2-3 sets - 10-15 reps  - Doorway Pec Stretch at 90 Degrees Abduction  - 2-3 x daily - 7 x weekly - 3 sets - 30 sec hold  - Shoulder External Rotation and Scapular Retraction with Resistance  - 3 x weekly - 1 sets - 2 min or endurance hold  - Quadruped Alternating Shoulder Flexion  - 3 x weekly - 1 sets - 10 reps  - Sidelying Thoracic Lumbar Rotation  - 2 x daily - 7 x weekly - 1-2 sets - 10 reps  - Sleeper Stretch  - 2-3 x daily - 7 x weekly - 3 sets - 30 sec hold  - Wall [place]  - 2 x daily - 7 x weekly - 2 sets - 10 reps    Therapeutic Treatments and Modalities:     1. Manual Therapy (CPT 57717), see below, x10 min    Therapeutic Treatment and Modalities Summary: Manual: STM to levator scap L and rhomboids followed by manual upward rotation mobs and scapular release L x10 gr III    Time-based treatments/modalities:    Physical Therapy Timed Treatment Charges  Manual therapy minutes (CPT 63823): 10 minutes  Therapeutic exercise minutes (CPT 87457): 33 minutes      Pain rating (1-10) before treatment:    Pain rating (1-10) after treatment:        ASSESSMENT:   Response to treatment:   Pt is currently 13 weeks s/p L shoulder arthroscopy, tendon repair including both supra and infra tendons, extensive debridement, subacromial decompression and open L biceps tenodesis (3/1/23). Discussion with Dr. Galaviz on 5/3, per surgeon, reporting pt currently has no precautions and let \"pain be the guide.\" Pt has maintained L GHJ flexion ROM improvements, reporting shoulder feels \"less stiff\" and has improved sleep hygiene. Fatigue, especially with GH ER challenges and into OH. Pt progressing well and will continue to work on mobility, becky into GH ER as tolerated in upcoming sessions.        PLAN/RECOMMENDATIONS: "   Plan for treatment: continue with current POC.  Planned interventions for next visit: continue with current treatment.  Progress report due next session  Discuss massage gun/tennis ball to pec to assist with soft tissue release  Review hep for carryover/symptoms

## 2023-06-05 ENCOUNTER — PHYSICAL THERAPY (OUTPATIENT)
Dept: PHYSICAL THERAPY | Facility: MEDICAL CENTER | Age: 38
End: 2023-06-05
Attending: FAMILY MEDICINE
Payer: COMMERCIAL

## 2023-06-05 DIAGNOSIS — M75.122 COMPLETE TEAR OF LEFT ROTATOR CUFF, UNSPECIFIED WHETHER TRAUMATIC: ICD-10-CM

## 2023-06-05 PROCEDURE — 97140 MANUAL THERAPY 1/> REGIONS: CPT

## 2023-06-05 PROCEDURE — 97110 THERAPEUTIC EXERCISES: CPT

## 2023-06-05 NOTE — OP THERAPY DAILY TREATMENT
Outpatient Physical Therapy  DAILY TREATMENT     [facility] Physical Therapy  [address]  [place] [address]  Phone:  [phone]  Fax:  [phone]    Date: 06/05/2023    Patient: Bassem Wilson  YOB: 1985  MRN: [id number]     Time Calculation    Start time: 0735  Stop time: 0814 Time Calculation (min): 39 minutes         Chief Complaint: Shoulder Problem    Visit #: 10    SUBJECTIVE:  Pt reporting work will ramp up to normal in next few weeks. Saw his surgeon and he is happy with progress, currently has no restrictions.       OBJECTIVE:  Current objective measures:   See progress note      Therapeutic Exercises (CPT 10076):     2. hep as below    9. t/s rotation in SL, x10 to L, NT    10. pulleys, GH flexion and GH IR, x5 min, warm up    12. pec stretch at wall, x30 sec, verbal review    13. Qped WS fwd/bwd->protraction+alt GH flexion, x10 with 2 sec hold, NT    14. seated flasher, orange->pink TB, x2 min, NT    16. sleeper stretch, x60 sec at 45 deg then active stretch x10 at 90 deg in L SL, NT    17. W's at wall, x10, NT    18. GH ER in SL and in sitting, 2# weight 1 round to fatigue each, muscle juddering and fatigue; reviewed    20. UPOC: 6/1/23      Therapeutic Exercise Summary: Access Code: [email or web address]  URL: [email or web address]  Date: 06/05/2023  Prepared by: Gabe Cortés    Exercises  - Correct Seated Posture  - 7 x weekly  - Standing Shoulder Scaption  - 1 x daily - 7 x weekly - 2 sets - 10 reps  - Thoracic Extension Mobilization on Foam Roll  - 2 x daily - 7 x weekly - 2 sets - 10 reps  - Standing Row with Resistance  - 3 x weekly - 2 sets - 10-15 reps - 2-3 sec hold  - Lat pull downs  - 3 x weekly - 2 sets - 10-15 reps - 2-3 sec hold  - Bridge with Arms Out and Feet on Swiss Ball  - 3 x weekly - 3 sets - 10-15 reps  - WITY's  - 3-4 x weekly - 2-3 sets - 10-15 reps  - Doorway Pec Stretch at 90 Degrees Abduction  - 2-3  x daily - 7 x weekly - 3 sets - 30 sec hold  - Shoulder External Rotation and Scapular Retraction with Resistance  - 3 x weekly - 1 sets - 2 min or endurance hold  - Quadruped Alternating Shoulder Flexion  - 3 x weekly - 1 sets - 10 reps  - Sidelying Thoracic Lumbar Rotation  - 2 x daily - 7 x weekly - 1-2 sets - 10 reps  - Sleeper Stretch  - 2-3 x daily - 7 x weekly - 3 sets - 30 sec hold  - Wall [place]  - 2 x daily - 7 x weekly - 2 sets - 10 reps  - Sidelying Shoulder ER with Towel and Dumbbell  - 3 x weekly - 2-3 sets - work to fatigue hold  - Seated Single Arm Shoulder Abduction and External Rotation with Dumbbell  - 3 x weekly - 2-3 sets - work to fatigue hold        Therapeutic Treatments and Modalities:     1. Manual Therapy (CPT 17751), see below, x22 min    Therapeutic Treatment and Modalities Summary: Manual: PA to L GHJ with increasing elevation gr III in abd and GH ER PROM with elevation while supine  Upward scapular mobs in R SL gr III with pt performing active abd LUE    Time-based treatments/modalities:    Physical Therapy Timed Treatment Charges  Manual therapy minutes (CPT 57149): 22 minutes  Therapeutic exercise minutes (CPT 55360): 17 minutes      Pain rating (1-10) before treatment:    Pain rating (1-10) after treatment:        ASSESSMENT:   Response to treatment:   See progress note       PLAN/RECOMMENDATIONS:   Plan for treatment: continue with current POC.  Planned interventions for next visit: continue with current treatment.  Continue manual to shoulder joint, periscapular, and t/s; continue strengthening  Review hep for carryover/symptoms

## 2023-06-05 NOTE — OP THERAPY PROGRESS SUMMARY
Outpatient Physical Therapy  PROGRESS SUMMARY NOTE      [facility] Outpatient Physical Therapy  [address]  [place] [address]  Phone:  [phone]  Fax:  [phone]    Date of Visit: 06/05/2023    Patient: Bassem Wilson  YOB: 1985  MRN: [id number]     Referring Provider: Kai Cooper M.D.  [address]  [address]  [place] [address]   Referring Diagnosis Complete rotator cuff tear or rupture of left shoulder, not specified as traumatic [M75.122]     Visit Diagnoses     ICD-10-CM   1. Complete tear of left rotator cuff, unspecified whether traumatic  M75.122       Rehab Potential: excellent    Progress Report Period: 5/1/23-6/5/23    Functional Assessment Used  Quickdash General Total Score: 36.36       Objective Findings and Assessment:   Patient progression towards goals: Pt is currently 14 weeks s/p L shoulder arthroscopy, tendon repair including both supra and infra tendons, extensive debridement, subacromial decompression and open L biceps tenodesis (3/1/23). Pt seen by Dr. Galaviz recently; released from all restrictions with follow up recommended in 6-8 weeks to determine good overall progression with strengthening. Nearing full ROM at L GHJ with good progress since eval; hypomobile L GHJ into post direction, hypomobile at scapula and t/s  with stiffness at L pec, which may also be restricting full range at this time. Improving coordination at scapula for normal scapulohumeral rhythm and RC for correct mechanics; cont'd weakness, which is expected at this point in rehab. 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                           Objective findings and assessment details:  L shoulder AROM seated pre treatment:  160 deg GH flexion   169 deg  "GH abd  45 deg GH ER    *with pulleys, is near full ROM while seated  Hypomobile L GHJ with AP mobs  Hypomobile throughout  Stiffness at pec  Surgical incision is closed; no signs of infection nor keloid scarring       L shoulder AROM seated post treatment:  165 deg GH flexion   165 deg GH abd  45 deg GH ER      Goals:   Short Term Goals:   Goals:   Short Term Goals:   1. Pt will be independent with written HEP. (Met)  2. Pt will demo all ex in clinic with correct posture with cuing <50%. (Met)  3. Pt will be able to progress to standing scaption with improved scapular mechanics consistently without \"pinching\" s/s. (met)        Short term goal time span:  2-4 weeks  Short term goal time span:  2-4 weeks      Long Term Goals:    Long Term Goals:    1. Pt will be independent with written HEP. (Met)  2. Pt will have a sig improvement in Quickdash score >/= MDC of 10 pts or greater (eval: 77.27) (Met)  3. Pt will demo full AROM without scapular substitutions and aberrant motions consistently to return to ADL's including reaching overhead at 12 weeks.  (Parially Met-ongoing)  4. Pt will demonstrate independence with periscapular and RC strengthening program without increase in discomfort in order to wean back into active lifestyle/weight lifting program at home. (Partially met -ongoing goal)        Long term goal time span:  4-6 months  Long term goal time span:  4-6 months    Plan:   Planned therapy interventions:  Neuromuscular Re-education (CPT 81958), Manual Therapy (CPT 95076) and Therapeutic Exercise (CPT 41758)  Frequency: 1-2x/wk.  Duration in weeks:  8  Plan details:  UPOC: 8/4/23      Referring provider co-signature:  I have reviewed this plan of care and my co-signature certifies the need for services.     Certification Period: 06/05/2023 to 8/4/23    Physician Signature: ________________________________ Date: ______________          "

## 2023-06-08 ENCOUNTER — PHYSICAL THERAPY (OUTPATIENT)
Dept: PHYSICAL THERAPY | Facility: MEDICAL CENTER | Age: 38
End: 2023-06-08
Attending: FAMILY MEDICINE
Payer: COMMERCIAL

## 2023-06-08 DIAGNOSIS — M75.122 COMPLETE TEAR OF LEFT ROTATOR CUFF, UNSPECIFIED WHETHER TRAUMATIC: ICD-10-CM

## 2023-06-08 PROCEDURE — 97110 THERAPEUTIC EXERCISES: CPT

## 2023-06-08 PROCEDURE — 97140 MANUAL THERAPY 1/> REGIONS: CPT

## 2023-06-08 NOTE — OP THERAPY DAILY TREATMENT
Outpatient Physical Therapy  DAILY TREATMENT     [facility] Outpatient Physical Therapy  [address]  [place] [address]  Phone:  [phone]  Fax:  [phone]    Date: 06/08/2023    Patient: Bassem Wilson  YOB: 1985  MRN: [id number]     Time Calculation    Start time: 0731  Stop time: 0811 Time Calculation (min): 40 minutes         Chief Complaint: Shoulder Problem    Visit #: 11    SUBJECTIVE:  Pt opting to screen knee after addressing shoulder as this is the priority. Shoulder has been feeling better but sometimes stiff after completing strengthening ex.       OBJECTIVE:  Current objective measures:      Objective findings and assessment details:    L shoulder AROM seated pre treatment:  152 deg GH flexion   169 deg GH abd  36 deg GH ER        L shoulder AROM seated post treatment:  165 deg GH flexion   169 deg GH abd  45 deg GH ER      Knee MRI 12/16/22:  Findings: There is fissuring and irregularity of the lateral femorotibial articular cartilage with some high-grade areas of focal cartilage loss.   IMPRESSION:     1.  Intact ligaments and menisci.     2.  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     3.  Small intraosseous ganglion involving the proximal tibia at the midline posteriorly.      Therapeutic Exercises (CPT 29642):     2. hep as below    10. pulleys, GH flexion and GH IR, x5 min, warm up    20. UPOC: 6/1/23      Therapeutic Exercise Summary: Access Code: [email or web address]  URL: [email or web address]  Date: 06/05/2023  Prepared by: Gabe Cortés    Exercises  - Correct Seated Posture  - 7 x weekly  - Standing Shoulder Scaption  - 1 x daily - 7 x weekly - 2 sets - 10 reps  - Thoracic Extension Mobilization on Foam Roll  - 2 x daily - 7 x weekly - 2 sets - 10 reps  - Standing Row with Resistance  - 3 x weekly - 2 sets - 10-15 reps - 2-3 sec hold  - Lat pull downs  - 3 x weekly - 2 sets - 10-15 reps  - 2-3 sec hold  - Bridge with Arms Out and Feet on Swiss Ball  - 3 x weekly - 3 sets - 10-15 reps  - WITY's  - 3-4 x weekly - 2-3 sets - 10-15 reps  - Doorway Pec Stretch at 90 Degrees Abduction  - 2-3 x daily - 7 x weekly - 3 sets - 30 sec hold  - Shoulder External Rotation and Scapular Retraction with Resistance  - 3 x weekly - 1 sets - 2 min or endurance hold  - Quadruped Alternating Shoulder Flexion  - 3 x weekly - 1 sets - 10 reps  - Sidelying Thoracic Lumbar Rotation  - 2 x daily - 7 x weekly - 1-2 sets - 10 reps  - Sleeper Stretch  - 2-3 x daily - 7 x weekly - 3 sets - 30 sec hold  - Wall [place]  - 2 x daily - 7 x weekly - 2 sets - 10 reps  - Sidelying Shoulder ER with Towel and Dumbbell  - 3 x weekly - 2-3 sets - work to fatigue hold  - Seated Single Arm Shoulder Abduction and External Rotation with Dumbbell  - 3 x weekly - 2-3 sets - work to fatigue hold        Therapeutic Treatments and Modalities:     1. Manual Therapy (CPT 84357), see below, x30 min    Therapeutic Treatment and Modalities Summary: Manual: PA to L GHJ with increasing elevation gr III in abd and GH ER PROM with elevation while supine. Followed by STM to levator scap and rhomboids and L scapular release in SL with pillow for modesty in R SL.   Upward scapular mobs in R SL gr III with pt performing active abd LUE, then adding resistance for engagement of posterior RC L //improved ROM into abd with resistance added.        Time-based treatments/modalities:    Physical Therapy Timed Treatment Charges  Manual therapy minutes (CPT 10395): 30 minutes  Therapeutic exercise minutes (CPT 77024): 10 minutes      Pain rating (1-10) before treatment:    Pain rating (1-10) after treatment:        ASSESSMENT:   Response to treatment:   Emphasis on manual today with pt reporting good compliance and knowledge of his hep. Pt lacking some passive ROM into extremes of elevation; will continue manual with extremity placed at end ranges to gain additional  range. Additionally, presenting with visible muscle juddering and weakness into elevation as expected at this phase in rehab. Overall, progressing well and will benefit from cont'd strengthening to periscap and RC, and mobilization in end ranges.       PLAN/RECOMMENDATIONS:   Plan for treatment: continue with current POC.  Planned interventions for next visit: continue with current treatment.  Continue manual to shoulder joint, periscapular, and t/s; continue strengthening  Review hep for carryover/symptoms

## 2023-06-12 ENCOUNTER — APPOINTMENT (OUTPATIENT)
Dept: PHYSICAL THERAPY | Facility: MEDICAL CENTER | Age: 38
End: 2023-06-12
Attending: FAMILY MEDICINE
Payer: COMMERCIAL

## 2023-06-15 ENCOUNTER — PHYSICAL THERAPY (OUTPATIENT)
Dept: PHYSICAL THERAPY | Facility: MEDICAL CENTER | Age: 38
End: 2023-06-15
Attending: FAMILY MEDICINE
Payer: COMMERCIAL

## 2023-06-15 DIAGNOSIS — M75.122 COMPLETE TEAR OF LEFT ROTATOR CUFF, UNSPECIFIED WHETHER TRAUMATIC: ICD-10-CM

## 2023-06-15 PROCEDURE — 97110 THERAPEUTIC EXERCISES: CPT

## 2023-06-15 PROCEDURE — 97140 MANUAL THERAPY 1/> REGIONS: CPT

## 2023-06-15 NOTE — OP THERAPY DAILY TREATMENT
Outpatient Physical Therapy  DAILY TREATMENT     [facility] Outpatient Physical Therapy  [address]  [place] [address]  Phone:  [phone]  Fax:  [phone]    Date: 06/15/2023    Patient: Bassem Wilson  YOB: 1985  MRN: [id number]     Time Calculation    Start time: 0733  Stop time: 0816 Time Calculation (min): 43 minutes         Chief Complaint: Shoulder Problem    Visit #: 12    SUBJECTIVE:  Pt reporting more clicking and some soreness at top of shoulder; attributing to wearing lead at work; otherwise no significant activity changes. States shoulder is feeling well and is able to complete most functional tasks.       OBJECTIVE:  Current objective measures:   Forward head and rounded shoulders      Objective findings and assessment details:    L shoulder AROM seated pre treatment:  155 deg GH flexion   164 deg GH abd  40 deg GH ER       Knee MRI 12/16/22:  Findings: There is fissuring and irregularity of the lateral femorotibial articular cartilage with some high-grade areas of focal cartilage loss.   IMPRESSION:     1.  Intact ligaments and menisci.     2.  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     3.  Small intraosseous ganglion involving the proximal tibia at the midline posteriorly.      Therapeutic Exercises (CPT 49322):     2. hep as below    10. pulleys, GH flexion and GH IR, x5 min, warm up    12. SA overhead with dumbbell, 5->8#->10#, x15 hooklying, VC's for core brace to avoid l/s arching; fatigue with visible muscle juddering at arms    14. walk away angels, orange TB, x15, fatigue with difficulty maintaining L GH ER    20. UPOC: 6/1/23      Therapeutic Exercise Summary: Access Code: [email or web address]  URL: [email or web address]  Date: 06/15/2023  Prepared by: Gabe Cortés    Exercises  - Correct Seated Posture  - 7 x weekly  - Standing Shoulder Scaption  - 1 x daily - 7 x weekly - 2 sets - 10  reps  - Thoracic Extension Mobilization on Foam Roll  - 2 x daily - 7 x weekly - 2 sets - 10 reps  - Standing Row with Resistance  - 3 x weekly - 2 sets - 10-15 reps - 2-3 sec hold  - Lat pull downs  - 3 x weekly - 2 sets - 10-15 reps - 2-3 sec hold  - Bridge with Arms Out and Feet on Swiss Ball  - 3 x weekly - 3 sets - 10-15 reps  - WITY's  - 3-4 x weekly - 2-3 sets - 10-15 reps  - Doorway Pec Stretch at 90 Degrees Abduction  - 2-3 x daily - 7 x weekly - 3 sets - 30 sec hold  - Shoulder External Rotation and Scapular Retraction with Resistance  - 3 x weekly - 1 sets - 2 min or endurance hold  - Quadruped Alternating Shoulder Flexion  - 3 x weekly - 1 sets - 10 reps  - Sidelying Thoracic Lumbar Rotation  - 2 x daily - 7 x weekly - 1-2 sets - 10 reps  - Sleeper Stretch  - 2-3 x daily - 7 x weekly - 3 sets - 30 sec hold  - Wall [place]  - 2 x daily - 3 x weekly - 2 sets - 10 reps  - Sidelying Shoulder ER with Towel and Dumbbell  - 3 x weekly - 2-3 sets - work to fatigue hold  - Seated Single Arm Shoulder Abduction and External Rotation with Dumbbell  - 3 x weekly - 2-3 sets - work to fatigue hold  - Supine 90/90 Overhead Dumbbell Raise  - 3 x weekly - 2-3 sets - 10-5 reps      Therapeutic Treatments and Modalities:     1. Manual Therapy (CPT 23467), see below, x29 min    Therapeutic Treatment and Modalities Summary: Manual: STM to levator scap and rhomboids and L scapular release in SL with pillow for modesty in R SL. PA and inf glides to L GHJ with increasing elevation gr III in abd and GH ER PROM with elevation while supine; completed with towel for distraction and OP manually.              Time-based treatments/modalities:    Physical Therapy Timed Treatment Charges  Manual therapy minutes (CPT 72019): 29 minutes  Therapeutic exercise minutes (CPT 24994): 14 minutes      ASSESSMENT:   Response to treatment:   Pt is currently 15 weeks s/p L shoulder arthroscopy, tendon repair including both supra and infra tendons,  "extensive debridement, subacromial decompression and open L biceps tenodesis (3/1/23). Discussion with Dr. Galaviz on 5/3, per surgeon, reporting pt currently has no precautions and let \"pain be the guide.\"       Cont'd lack of GH ER secondary to lack of PROM and weakness at periscapular/RC musculature. Overall is doing well with min discomfort; able to decrease soreness and clicking at ant/sup L shoulder with release to scapula in session. Unable to complete PRP injections to knee, requesting to screen knee at next session. Will additionally re-eval shoulder next session due to new referral with different provider listed to stay in compliance with insurance.    PLAN/RECOMMENDATIONS:   Plan for treatment: continue with current POC.  Planned interventions for next visit: continue with current treatment.  Re-eval due to referral with new provider listed, screen knee  Add RC activation with pillowcase at wall   Challenge  ER in different positions and assess extension       "

## 2023-06-19 ENCOUNTER — PHYSICAL THERAPY (OUTPATIENT)
Dept: PHYSICAL THERAPY | Facility: MEDICAL CENTER | Age: 38
End: 2023-06-19
Attending: FAMILY MEDICINE
Payer: COMMERCIAL

## 2023-06-19 DIAGNOSIS — M75.122 COMPLETE TEAR OF LEFT ROTATOR CUFF, UNSPECIFIED WHETHER TRAUMATIC: ICD-10-CM

## 2023-06-19 DIAGNOSIS — G89.29 CHRONIC PAIN OF RIGHT KNEE: ICD-10-CM

## 2023-06-19 DIAGNOSIS — M25.561 CHRONIC PAIN OF RIGHT KNEE: ICD-10-CM

## 2023-06-19 PROCEDURE — 97162 PT EVAL MOD COMPLEX 30 MIN: CPT

## 2023-06-19 PROCEDURE — 97110 THERAPEUTIC EXERCISES: CPT

## 2023-06-19 ASSESSMENT — ENCOUNTER SYMPTOMS
PAIN SCALE: 3
PAIN SCALE AT LOWEST: 0
QUALITY: ACHING

## 2023-06-19 NOTE — OP THERAPY EVALUATION
Outpatient Physical Therapy  INITIAL EVALUATION    [facility] Outpatient Physical Therapy  [address]  [place] [address]  Phone:  [phone]  Fax:  [phone]    Date of Evaluation: 06/19/2023    Patient: Bassem Wilson  YOB: 1985  MRN: [id number]     Referring Provider: Antwon Galaviz M.D.  [address]  [place] [address]   Referring Diagnosis Other specified postprocedural states [Z98.890]     Time Calculation  Start time: 0735  Stop time: 0817 Time Calculation (min): 42 minutes         Chief Complaint: Shoulder Problem and Knee Problem    Visit Diagnoses     ICD-10-CM   1. Complete tear of left rotator cuff, unspecified whether traumatic  M75.122   2. Chronic pain of right knee  M25.561    G89.29       Date of onset of impairment: No data found    Subjective:   History of Present Illness:     Mechanism of injury:  Patient is a 37-year-old male with a PMH including: B elbow exploration, mild intermittent asthma, ADHD, cubital tunnel syndrome, obesity. R rotator cuff hx on R shoulder in distant past. No prev hx of L shoulder complaints.     Pt sustained L RCT from fall while snowboarding in Jan 2023. Pt is currently 16 weeks s/p L shoulder arthroscopy, tendon repair including both supra and infra tendons, extensive debridement, subacromial decompression and open L biceps tenodesis (3/1/23). Pt was receiving PT rehab at [facility] immediately after sx; Passive treatment x 7 weeks progressing to AAROM and AROM. Pt has new referral by Dr. Toney; recently seen and released from all restrictions with follow up recommended in a few weeks to determine good overall progression with strengthening. Nearing full ROM at L GHJ with good progress since eval; hypomobile L GHJ into post direction, hypomobile at scapula and t/s with stiffness at L pec.    Pt also has concerns re: his anterolateral R knee pain. Intermittent pain  and swelling that occurs grossly 2x/year. Initiated 4 years ago, suspects due to sports in the past; believes these are OA flare ups. Pt stating pain is occurring more frequently with incr duration, now lasting weeks to a month compared to a few days.  Had imaging completed 6 months ago, remarkable for articular cartilage impairments and ganglion cyst. Is hopeful to complete PRP injections in the future. Crepitus with decreased ROM when knee pain occurs. No numbness/tingling. Denies weakness. Pain does not radiate from anterolateral R knee.      Prior level of function:  Physiatrist, indep with all ADL's and IADL's; very active with crossfit at home workouts  Pain:     Current pain rating:  3    At best pain ratin    Location:  Anterolateral pain at the R knee    Quality:  Aching    Progression:  Improving    Pain Comments::  Aggravating factors: descending stairs, hiking downhill, squatting, knee extension at end range, strenuous work outs    Relieving factors: ice, rest, resting in sitting   Social Support:     Lives with:  Spouse and young children  Hand dominance:  Right  Diagnostic Tests:     Diagnostic Tests Comments:  MRI shoulder L 2/10/23  IMPRESSION:     1.  Full-thickness tear of the majority of the fibers of the supraspinatus tendon. This measures 2 cm in AP dimension and results in 4 cm of retraction.     2.  Tendinosis and partial-thickness tear of the articular surface fibers of the infraspinatus tendon.     3.  Tendinosis and partial-thickness tear of the bursal surface fibers of the subscapularis tendon.     4.  Degenerative change of the acromioclavicular joint with small inferiorly directed osteophytes.     5.  Moderate-sized inferiorly directed subacromial enthesophyte.     6.  Fraying of the posterior/superior glenoid labrum.     7.  Degenerative tearing of the anterior/inferior and posterior/inferior     8.  Mild degenerative change of the glenohumeral joint.    R knee MRI  12/16/22:  FINDINGS:     Menisci: Intact.     Tendons and Ligaments: The cruciate ligaments are intact. The collateral ligaments are intact.     Extensor Mechanism/Patella: The extensor mechanism is intact.   The articular cartilage is intact.     Osseous Structures/Cartilaginous surfaces: There is fissuring and irregularity of the lateral femorotibial articular cartilage with some high-grade areas of focal cartilage loss. There are small lateral and medial femorotibial osteophytes. There is small   intraosseous ganglion involving the proximal tibia the midline posteriorly.     Miscellaneous: There is minimal joint effusion. There is no significant popliteal cyst.     IMPRESSION:     1.  Intact ligaments and menisci.     2.  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     3.  Small intraosseous ganglion involving the proximal tibia at the midline posteriorly.    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  Hobbies/exercise: prev to injury: weight lifting, push ups, home crossfit style workouts, snowboarding and baseball    Is currently father to two children. Is having difficulties with assisting with child bearing such as picking up infant and other ADL's requiring lifting.    Current hep: scaption, GH ER with stick, prone T's, prone rows  Patient Goals:     Patient goals for therapy:  Decreased pain, independence with ADLs/IADLs and return to sport/leisure activities      Past Medical History:   Diagnosis Date    Asthma     Mild intermitant    Hypogonadism in male      Past Surgical History:   Procedure Laterality Date    PB SHLDR ARTHROSCOP,SURG,W/ROTAT CUFF REPB Left 3/1/2023    Procedure: LEFT SHOULDER ARTHROSCOPY FOR ROTATOR CUFF REPAIR, DEBRIDEMENT, SUBACROMIAL DECOMPRESSION AND OPEN BICEPS TENODESIS WITH REPAIRS AS  INDICATED;  Surgeon: Antwon Galaviz M.D.;  Location: SURGERY [facility];  Service: Orthopedics    OPEN BICEPS TENODESIS Left 3/1/2023    Procedure: TENODESIS, BICEPS, OPEN;  Surgeon: Antwon Galaviz M.D.;  Location: SURGERY [facility];  Service: Orthopedics    BICEPS TENDON REPAIR      ELBOW EXPLORATION Bilateral     HIP ARTHROSCOPY Bilateral     KNEE ARTHROSCOPY W/ ACL HEALING RESPONSE Left     ROTATOR CUFF REPAIR       Social History     Tobacco Use    Smoking status: Never    Smokeless tobacco: Never   Vaping Use    Vaping Use: Never used   Substance Use Topics    Alcohol use: Yes     Family and Occupational History     Socioeconomic History    Marital status:      Spouse name: Not on file    Number of children: Not on file    Years of education: Not on file    Highest education level: Professional school degree (e.g., MD, DDS, DVM, SERGIO)   Occupational History    Not on file       Objective     Static Posture     Comments  Objective findings and assessment details:      L shoulder AROM seated:  158 deg GH flexion   169 deg GH abd  42 deg GH ER  44 deg Extension (56 deg extension R shoulder)   GH IR BTB WFL (equal to R shoulder)     strength: (pt is R hand dominant)  98, 97, 90 R in #   105, 95, 86 L in #     L knee ROM WFL  Knee: 0-130 with pain at end range extension   *ROM tested supine    Hip ROM:  Slight tightness at hip flexor R with increased l/s ext ROM  Hip IR/ER and HS length WFL B *tested supine 90/90    Strength LE screen:  Bridge: full ROM  SL bridge: pelvic drop B  Glute med: 4/5 B  Hip extensor strength in supine SL: 4+/5 L; 3+/5 R    Mechanics:  Squat: slight quad dominance with audible crepitus        Therapeutic Exercises (CPT 78520):     1. see current hep as below      Therapeutic Exercise Summary: Access Code: [email or web address]  URL: [email or web address]  Date: 06/19/2023  Prepared by: Gabe Cortés    Exercises  - Correct Seated Posture  - 7 x weekly  - Standing  Shoulder Scaption  - 1 x daily - 7 x weekly - 2 sets - 10 reps  - Thoracic Extension Mobilization on Foam Roll  - 2 x daily - 7 x weekly - 2 sets - 10 reps  - Standing Row with Resistance  - 3 x weekly - 2 sets - 10-15 reps - 2-3 sec hold  - Lat pull downs  - 3 x weekly - 2 sets - 10-15 reps - 2-3 sec hold  - Bridge with Arms Out and Feet on Swiss Ball  - 3 x weekly - 3 sets - 10-15 reps  - WITY's  - 3-4 x weekly - 2-3 sets - 10-15 reps  - Doorway Pec Stretch at 90 Degrees Abduction  - 2-3 x daily - 7 x weekly - 3 sets - 30 sec hold  - Shoulder External Rotation and Scapular Retraction with Resistance  - 3 x weekly - 1 sets - 2 min or endurance hold  - Quadruped Alternating Shoulder Flexion  - 3 x weekly - 1 sets - 10 reps  - Sidelying Thoracic Lumbar Rotation  - 2 x daily - 7 x weekly - 1-2 sets - 10 reps  - Sleeper Stretch  - 2-3 x daily - 7 x weekly - 3 sets - 30 sec hold  - Wall [place]  - 2 x daily - 3 x weekly - 2 sets - 10 reps  - Sidelying Shoulder ER with Towel and Dumbbell  - 3 x weekly - 2-3 sets - work to fatigue hold  - Seated Single Arm Shoulder Abduction and External Rotation with Dumbbell  - 3 x weekly - 2-3 sets - work to fatigue hold  - Supine 90/90 Overhead Dumbbell Raise  - 3 x weekly - 2-3 sets - 10-5 reps  - Clam with Resistance  - 3 x weekly - 2-3 sets - 15 reps - 3 sec hold  - Marching Bridge  - 3 x weekly - 2-3 sets - 15 reps - 1-2 hold  - Standing Shoulder Extension with Dowel  - 2 x daily - 7 x weekly - 2 sets - 10 reps      Time-based treatments/modalities:    Physical Therapy Timed Treatment Charges  Therapeutic exercise minutes (CPT 58978): 12 minutes      Assessment, Response and Plan:   Impairments: abnormal or restricted ROM, activity intolerance, impaired physical strength and lacks appropriate home exercise program    Assessment details:  Pt presents to therapy for surgical aftercare after he sustained L RCT from fall while snowboarding in Jan 2023. He is currently 9 weeks s/p L  shoulder arthroscopy, tendon repair including both supra and infra tendons, extensive debridement, subacromial decompression and open L biceps tenodesis (3/1/23). No red flags reported. Denies n/t at BUE's. Next follow up with Dr. Madison undetermined at this point as he is switching clinics per pt report. Exam findings suggestive of  impaired GHJ ROM becky GH ER with firm end feel, improving L scapular mechanics with OH GHJ elevation, improving weakness at L periscapular and RC.  strength WFL. In regards to knee complaints: frontal plane and posterior chain weakness SL>DL R>L, hip flexor tightness L. Squat is slightly glute dominant with audible crepitus at R knee. B Hip IR/ER ROM and knee ROM grossly WFL with exception of R knee pain at end range knee ext and flexion at 130 deg. Patellar mobility intact; ankle ROM WFL PF and DF. Pt may benefit from skilled physical therapy in order to address above impairments in order to improve QOL and return to reported ADL's.             Prognosis: good    Goals:   Short Term Goals:   1. Pt will obtain 6 deg or more in all planes at L GHJ to improve overall functioning.  2. Pt will demo increased glute dominant squat and eccentric control without pain at least 50% of the time.    Short term goal time span:  4-6 weeks      Long Term Goals:    1. Pt will be independent with written HEP.  2. Pt will have a sig improvement in Quickdash score >/= MDC of 10 pts or greater (eval: 43.18)  3. Pt will have sig improvement in WOMAC score (eval: 28.13)  4. Pt will obtain 10 deg or more in all planes at L GHJ to improve overall functioning.  5. Pt will demonstrate independence with periscapular and RC strengthening program without increase in discomfort in order to wean back into active lifestyle/weight lifting program at home.   6.  Pt will demo increased glute dominant squat and eccentric control without pain at least 75% of the time in order to navigate stairs and uneven terrain.      Long term goal time span:  1-2 months    Plan:   Therapy options:  Physical therapy treatment to continue  Planned therapy interventions:  Manual Therapy (CPT 72615), Neuromuscular Re-education (CPT 64215) and Therapeutic Exercise (CPT 07950)  Other planned therapy interventions:  BFR is patient is open to idea  Frequency: 1-2x/wk.  Duration in weeks:  8  Discussed with:  Patient  Plan details:  UPOC: 8/18/23          Functional Assessment Used  Quickdash General Total Score: 43.18  WOMAC Grand Total: 28.13     Referring provider co-signature:  I have reviewed this plan of care and my co-signature certifies the need for services.    Certification Period: 06/19/2023 to  8/18/23    Physician Signature: ________________________________ Date: ______________

## 2023-06-22 ENCOUNTER — PHYSICAL THERAPY (OUTPATIENT)
Dept: PHYSICAL THERAPY | Facility: MEDICAL CENTER | Age: 38
End: 2023-06-22
Attending: FAMILY MEDICINE
Payer: COMMERCIAL

## 2023-06-22 DIAGNOSIS — M25.561 CHRONIC PAIN OF RIGHT KNEE: ICD-10-CM

## 2023-06-22 DIAGNOSIS — M75.122 COMPLETE TEAR OF LEFT ROTATOR CUFF, UNSPECIFIED WHETHER TRAUMATIC: ICD-10-CM

## 2023-06-22 DIAGNOSIS — G89.29 CHRONIC PAIN OF RIGHT KNEE: ICD-10-CM

## 2023-06-22 PROCEDURE — 97110 THERAPEUTIC EXERCISES: CPT

## 2023-06-22 PROCEDURE — 97140 MANUAL THERAPY 1/> REGIONS: CPT

## 2023-06-22 PROCEDURE — 97014 ELECTRIC STIMULATION THERAPY: CPT

## 2023-06-22 NOTE — OP THERAPY DAILY TREATMENT
Outpatient Physical Therapy  DAILY TREATMENT     [facility] Outpatient Physical Therapy  [address]  [place] [address]  Phone:  [phone]  Fax:  [phone]    Date: 06/22/2023    Patient: Bassem Wilson  YOB: 1985  MRN: [id number]     Time Calculation    Start time: 0730  Stop time: 0827 Time Calculation (min): 57 minutes         Chief Complaint: Knee Problem and Shoulder Problem    Visit #: 14    SUBJECTIVE:  Pt reporting more clicking and some soreness at top of shoulder; attributing to wearing lead at work; otherwise no significant activity changes. States shoulder is feeling well and is able to complete most functional tasks.       OBJECTIVE:  Current objective measures:   Forward head and rounded shoulders      Objective findings and assessment details:    L shoulder AROM seated:  160 deg GH flexion   170 deg GH abd  45 deg GH ER  56 deg extension in standing (56 deg RUE)       Knee MRI 12/16/22:  Findings: There is fissuring and irregularity of the lateral femorotibial articular cartilage with some high-grade areas of focal cartilage loss.   IMPRESSION:     1.  Intact ligaments and menisci.     2.  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     3.  Small intraosseous ganglion involving the proximal tibia at the midline posteriorly.      Therapeutic Exercises (CPT 26809):     2. hep as below    9. shoulder extension mobilization at bar, 3x2 min, increasing elevation as tolerated; reported 'soreness' at ant shoulder following; edu for set up including patellar alignment over knee due to current R knee impairments    10. pulleys, GH flexion and GH IR, x5 min, warm up    12. SA overhead with dumbbell, 5->8#->10#, x15 hooklying, VC's for core brace to avoid l/s arching; fatigue with visible muscle juddering at arms    13. wall W's, x10, improved GH ER 90/90 at wall compared to prev sessions    14. walk away  angels, orange TB, x15, fatigue with difficulty maintaining L GH ER; reviewed    15. SA lift off with roller, x10, fatigue with visible muscle juddering L    16. prone angels, x10, fatigue with loss GH ER with OH; hep    17. RC walk away at wall (pillowcase with 2# dumbbell), x15, fatigue    20. UPOC: 6/1/23      Therapeutic Exercise Summary: Access Code: [email or web address]  URL: [email or web address]  Date: 06/15/2023  Prepared by: Gabe Cortés    Exercises  - Correct Seated Posture  - 7 x weekly  - Standing Shoulder Scaption  - 1 x daily - 7 x weekly - 2 sets - 10 reps  - Thoracic Extension Mobilization on Foam Roll  - 2 x daily - 7 x weekly - 2 sets - 10 reps  - Standing Row with Resistance  - 3 x weekly - 2 sets - 10-15 reps - 2-3 sec hold  - Lat pull downs  - 3 x weekly - 2 sets - 10-15 reps - 2-3 sec hold  - Bridge with Arms Out and Feet on Swiss Ball  - 3 x weekly - 3 sets - 10-15 reps  - WITY's  - 3-4 x weekly - 2-3 sets - 10-15 reps  - Doorway Pec Stretch at 90 Degrees Abduction  - 2-3 x daily - 7 x weekly - 3 sets - 30 sec hold  - Shoulder External Rotation and Scapular Retraction with Resistance  - 3 x weekly - 1 sets - 2 min or endurance hold  - Quadruped Alternating Shoulder Flexion  - 3 x weekly - 1 sets - 10 reps  - Sidelying Thoracic Lumbar Rotation  - 2 x daily - 7 x weekly - 1-2 sets - 10 reps  - Sleeper Stretch  - 2-3 x daily - 7 x weekly - 3 sets - 30 sec hold  - Wall [place]  - 2 x daily - 3 x weekly - 2 sets - 10 reps  - Sidelying Shoulder ER with Towel and Dumbbell  - 3 x weekly - 2-3 sets - work to fatigue hold  - Seated Single Arm Shoulder Abduction and External Rotation with Dumbbell  - 3 x weekly - 2-3 sets - work to fatigue hold  - Supine 90/90 Overhead Dumbbell Raise  - 3 x weekly - 2-3 sets - 10-5 reps      Therapeutic Treatments and Modalities:     1. Manual Therapy (CPT 86466), see below, x12 min    2. E Stim Unattended (CPT 77696), IFC and cold pack to R knee; cold pack to  "shoulder, x15 min    Therapeutic Treatment and Modalities Summary: Manual: STM to levator scap and rhomboids and L scapular upward rotation and release in SL with pillow for modesty in R SL.             Time-based treatments/modalities:    Physical Therapy Timed Treatment Charges  Manual therapy minutes (CPT 24061): 12 minutes  Therapeutic exercise minutes (CPT 69274): 30 minutes      ASSESSMENT:   Response to treatment:   Pt is currently 16 weeks s/p L shoulder arthroscopy, tendon repair including both supra and infra tendons, extensive debridement, subacromial decompression and open L biceps tenodesis (3/1/23). Discussion with Dr. Galaviz on 5/3, per surgeon, reporting pt currently has no precautions and let \"pain be the guide.\"     Improving ROM following self ext mobilization at bar with pos impact in multiple planes. Discussion with pt to recreate at home for ant shoulder stretching and self mobs that are more beneficial than ext using staff only. Improving strength noted with some cont'd weaknesses becky into elevation. Pt reporting soreness but not pain at 8/10 immediately after completing his ex. Good progression in therapy, will continue to address ROM deficits and strengthen in full range as tolerated. Will update pt's hep with today's ex in next session if good tolerance following today's treatment. Trial of IFC today for pain management and addressing soreness post session.      PLAN/RECOMMENDATIONS:   Plan for treatment: continue with current POC.  Planned interventions for next visit: continue with current treatment.  Update shoulder hep; progression of knee ex; provide info re: BFR        "

## 2023-06-23 ENCOUNTER — HOSPITAL ENCOUNTER (OUTPATIENT)
Dept: LAB | Facility: MEDICAL CENTER | Age: 38
End: 2023-06-23
Attending: FAMILY MEDICINE
Payer: COMMERCIAL

## 2023-06-23 DIAGNOSIS — Z00.00 ANNUAL PHYSICAL EXAM: ICD-10-CM

## 2023-06-23 DIAGNOSIS — M13.0 POLYARTHRITIS: ICD-10-CM

## 2023-06-23 LAB
ALBUMIN SERPL BCP-MCNC: 4.7 G/DL (ref 3.2–4.9)
ALBUMIN/GLOB SERPL: 1.7 G/DL
ALP SERPL-CCNC: 57 U/L (ref 30–99)
ALT SERPL-CCNC: 31 U/L (ref 2–50)
ANION GAP SERPL CALC-SCNC: 12 MMOL/L (ref 7–16)
APPEARANCE FLD: NORMAL
AST SERPL-CCNC: 25 U/L (ref 12–45)
BASOPHILS # BLD AUTO: 0.5 % (ref 0–1.8)
BASOPHILS # BLD: 0.03 K/UL (ref 0–0.12)
BASOPHILS NFR FLD: 2 %
BILIRUB SERPL-MCNC: 0.3 MG/DL (ref 0.1–1.5)
BODY FLD TYPE: NORMAL
BUN SERPL-MCNC: 23 MG/DL (ref 8–22)
CALCIUM ALBUM COR SERPL-MCNC: 8.6 MG/DL (ref 8.5–10.5)
CALCIUM SERPL-MCNC: 9.2 MG/DL (ref 8.4–10.2)
CHLORIDE SERPL-SCNC: 105 MMOL/L (ref 96–112)
CHOLEST SERPL-MCNC: 231 MG/DL (ref 100–199)
CO2 SERPL-SCNC: 24 MMOL/L (ref 20–33)
COLOR FLD: YELLOW
CREAT SERPL-MCNC: 0.96 MG/DL (ref 0.5–1.4)
CRYSTALS FLD MICRO: NORMAL
EOSINOPHIL # BLD AUTO: 0.07 K/UL (ref 0–0.51)
EOSINOPHIL NFR BLD: 1.1 % (ref 0–6.9)
ERYTHROCYTE [DISTWIDTH] IN BLOOD BY AUTOMATED COUNT: 38.4 FL (ref 35.9–50)
FASTING STATUS PATIENT QL REPORTED: NORMAL
GFR SERPLBLD CREATININE-BSD FMLA CKD-EPI: 103 ML/MIN/1.73 M 2
GLOBULIN SER CALC-MCNC: 2.8 G/DL (ref 1.9–3.5)
GLUCOSE SERPL-MCNC: 95 MG/DL (ref 65–99)
HCT VFR BLD AUTO: 49.4 % (ref 42–52)
HDLC SERPL-MCNC: 63 MG/DL
HGB BLD-MCNC: 16.6 G/DL (ref 14–18)
IMM GRANULOCYTES # BLD AUTO: 0.02 K/UL (ref 0–0.11)
IMM GRANULOCYTES NFR BLD AUTO: 0.3 % (ref 0–0.9)
LDLC SERPL CALC-MCNC: 130 MG/DL
LYMPHOCYTES # BLD AUTO: 1.37 K/UL (ref 1–4.8)
LYMPHOCYTES NFR BLD: 21.1 % (ref 22–41)
LYMPHOCYTES NFR FLD: 36 %
MCH RBC QN AUTO: 28.9 PG (ref 27–33)
MCHC RBC AUTO-ENTMCNC: 33.6 G/DL (ref 32.3–36.5)
MCV RBC AUTO: 86.1 FL (ref 81.4–97.8)
MONOCYTES # BLD AUTO: 0.62 K/UL (ref 0–0.85)
MONOCYTES NFR BLD AUTO: 9.5 % (ref 0–13.4)
MONOS+MACROS NFR FLD MANUAL: 49 %
NEUTROPHILS # BLD AUTO: 4.39 K/UL (ref 1.82–7.42)
NEUTROPHILS NFR BLD: 67.5 % (ref 44–72)
NEUTROPHILS NFR FLD: 13 %
NRBC # BLD AUTO: 0 K/UL
NRBC BLD-RTO: 0 /100 WBC (ref 0–0.2)
NUC CELL # FLD: 52 CELLS/UL
PLATELET # BLD AUTO: 243 K/UL (ref 164–446)
PMV BLD AUTO: 9.9 FL (ref 9–12.9)
POTASSIUM SERPL-SCNC: 4.3 MMOL/L (ref 3.6–5.5)
PROT SERPL-MCNC: 7.5 G/DL (ref 6–8.2)
RBC # BLD AUTO: 5.74 M/UL (ref 4.7–6.1)
RBC # FLD: 2000 CELLS/UL
SODIUM SERPL-SCNC: 141 MMOL/L (ref 135–145)
T4 FREE SERPL-MCNC: 1.4 NG/DL (ref 0.93–1.7)
TRIGL SERPL-MCNC: 192 MG/DL (ref 0–149)
TSH SERPL DL<=0.005 MIU/L-ACNC: 1.34 UIU/ML (ref 0.38–5.33)
URATE SERPL-MCNC: 5.6 MG/DL (ref 2.5–8.3)
WBC # BLD AUTO: 6.5 K/UL (ref 4.8–10.8)

## 2023-06-23 PROCEDURE — 84402 ASSAY OF FREE TESTOSTERONE: CPT

## 2023-06-23 PROCEDURE — 84270 ASSAY OF SEX HORMONE GLOBUL: CPT

## 2023-06-23 PROCEDURE — 83036 HEMOGLOBIN GLYCOSYLATED A1C: CPT

## 2023-06-23 PROCEDURE — 86235 NUCLEAR ANTIGEN ANTIBODY: CPT

## 2023-06-23 PROCEDURE — 36415 COLL VENOUS BLD VENIPUNCTURE: CPT

## 2023-06-23 PROCEDURE — 82172 ASSAY OF APOLIPOPROTEIN: CPT

## 2023-06-23 PROCEDURE — 84443 ASSAY THYROID STIM HORMONE: CPT

## 2023-06-23 PROCEDURE — 86225 DNA ANTIBODY NATIVE: CPT

## 2023-06-23 PROCEDURE — 86200 CCP ANTIBODY: CPT

## 2023-06-23 PROCEDURE — 84153 ASSAY OF PSA TOTAL: CPT

## 2023-06-23 PROCEDURE — 85025 COMPLETE CBC W/AUTO DIFF WBC: CPT

## 2023-06-23 PROCEDURE — 80053 COMPREHEN METABOLIC PANEL: CPT

## 2023-06-23 PROCEDURE — 86431 RHEUMATOID FACTOR QUANT: CPT

## 2023-06-23 PROCEDURE — 89051 BODY FLUID CELL COUNT: CPT

## 2023-06-23 PROCEDURE — 86038 ANTINUCLEAR ANTIBODIES: CPT

## 2023-06-23 PROCEDURE — 86803 HEPATITIS C AB TEST: CPT

## 2023-06-23 PROCEDURE — 84403 ASSAY OF TOTAL TESTOSTERONE: CPT

## 2023-06-23 PROCEDURE — 84550 ASSAY OF BLOOD/URIC ACID: CPT

## 2023-06-23 PROCEDURE — 84439 ASSAY OF FREE THYROXINE: CPT

## 2023-06-23 PROCEDURE — 84481 FREE ASSAY (FT-3): CPT

## 2023-06-23 PROCEDURE — 80061 LIPID PANEL: CPT

## 2023-06-23 PROCEDURE — 89060 EXAM SYNOVIAL FLUID CRYSTALS: CPT

## 2023-06-24 LAB
EST. AVERAGE GLUCOSE BLD GHB EST-MCNC: 108 MG/DL
HBA1C MFR BLD: 5.4 % (ref 4–5.6)
HCV AB SER QL: NORMAL
PSA SERPL-MCNC: 0.55 NG/ML (ref 0–4)
RHEUMATOID FACT SER IA-ACNC: 10 IU/ML (ref 0–14)
T3FREE SERPL-MCNC: 4.12 PG/ML (ref 2–4.4)

## 2023-06-25 LAB
SHBG SERPL-SCNC: 38 NMOL/L (ref 17–56)
TESTOST FREE MFR SERPL: 1.9 % (ref 1.6–2.9)
TESTOST FREE SERPL-MCNC: 171 PG/ML (ref 47–244)
TESTOST SERPL-MCNC: 883 NG/DL (ref 300–1080)

## 2023-06-26 ENCOUNTER — PHYSICAL THERAPY (OUTPATIENT)
Dept: PHYSICAL THERAPY | Facility: MEDICAL CENTER | Age: 38
End: 2023-06-26
Attending: FAMILY MEDICINE
Payer: COMMERCIAL

## 2023-06-26 DIAGNOSIS — M75.122 COMPLETE TEAR OF LEFT ROTATOR CUFF, UNSPECIFIED WHETHER TRAUMATIC: ICD-10-CM

## 2023-06-26 DIAGNOSIS — G89.29 CHRONIC PAIN OF RIGHT KNEE: ICD-10-CM

## 2023-06-26 DIAGNOSIS — M25.561 CHRONIC PAIN OF RIGHT KNEE: ICD-10-CM

## 2023-06-26 LAB
APO A-I SERPL-MCNC: 174 MG/DL (ref 94–178)
CCP IGG SERPL-ACNC: 3 UNITS (ref 0–19)
DSDNA AB TITR SER CLIF: NORMAL {TITER}
ENA SM IGG SER-ACNC: 2 AU/ML (ref 0–40)
NUCLEAR IGG SER QL IA: NORMAL

## 2023-06-26 PROCEDURE — 97110 THERAPEUTIC EXERCISES: CPT

## 2023-06-26 NOTE — OP THERAPY DAILY TREATMENT
Outpatient Physical Therapy  DAILY TREATMENT     [facility] Outpatient Physical Therapy  [address]  [place] [address]  Phone:  [phone]  Fax:  [phone]    Date: 06/26/2023    Patient: Bassem Wilson  YOB: 1985  MRN: [id number]     Time Calculation    Start time: 0733  Stop time: 0817 Time Calculation (min): 44 minutes         Chief Complaint: Knee Problem and Shoulder Problem    Visit #: 15    SUBJECTIVE:  Pt reporting min sore from last session; no significant DOMS. Completed aspiration and steroid injection at knee -negative for gout. Knee feels 80% better after injection.      OBJECTIVE:  Current objective measures:   Forward head and rounded shoulders      Objective findings and assessment details:    L shoulder AROM seated pre-treatment:  160 deg GH flexion   176 deg GH abd  44 deg GH ER  45 deg extension in standing (56 deg RUE)    L shoulder AROM seated post-treatment:  50 deg extension in standing following self GH mobs ext       Knee MRI 12/16/22:  Findings: There is fissuring and irregularity of the lateral femorotibial articular cartilage with some high-grade areas of focal cartilage loss.   IMPRESSION:     1.  Intact ligaments and menisci.     2.  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     3.  Small intraosseous ganglion involving the proximal tibia at the midline posteriorly.      Therapeutic Exercises (CPT 63157):     2. hep as below    7. marching SL bridge with band, green Band    9. shoulder extension mobilization at bar, x1 min, reviewed; improved shoulder extension following    10. pulleys, GH flexion and GH IR, x5 min, NT    11. glute med eccentrics-hip hike at bar, 10x ea    12. SA overhead with dumbbell, 5->8#->10#, x15 hooklying, NT    13. modified plantigrade fire hydrant and hip ext standing, green TB, x10 ea, hep    14. walk away angels, orange TB, x15, verbal review    15. SA lift  off with roller, x10, reviewed    16. prone angels, x10, reviewed    17. RC walk away at wall (pillowcase with 2# dumbbell), to fatigue, reviewed    18. ball bridge+SL lifts, 5x 1-2 sec hold    19. ball bridge+HS rolls, 10x    20. monster walk, green TB 2x25 ft, fatigue      Therapeutic Exercise Summary:    Access Code: [email or web address]  URL: [email or web address]  Date: 06/26/2023  Prepared by: Gabe Cortés    Exercises  - Correct Seated Posture  - 7 x weekly  - Standing Shoulder Scaption  - 1 x daily - 7 x weekly - 2 sets - 10 reps  - Thoracic Extension Mobilization on Foam Roll  - 2 x daily - 7 x weekly - 2 sets - 10 reps  - Standing Row with Resistance  - 3 x weekly - 2 sets - 10-15 reps - 2-3 sec hold  - Lat pull downs  - 3 x weekly - 2 sets - 10-15 reps - 2-3 sec hold  - Bridge with Arms Out and Feet on Swiss Ball  - 3 x weekly - 3 sets - 10-15 reps  - WITY's  - 3-4 x weekly - 2-3 sets - 10-15 reps  - Doorway Pec Stretch at 90 Degrees Abduction  - 2-3 x daily - 7 x weekly - 3 sets - 30 sec hold  - Shoulder External Rotation and Scapular Retraction with Resistance  - 3 x weekly - 1 sets - 2 min or endurance hold  - Quadruped Alternating Shoulder Flexion  - 3 x weekly - 1 sets - 10 reps  - Sidelying Thoracic Lumbar Rotation  - 2 x daily - 7 x weekly - 1-2 sets - 10 reps  - Sleeper Stretch  - 2-3 x daily - 7 x weekly - 3 sets - 30 sec hold  - Wall [place]  - 2 x daily - 3 x weekly - 2 sets - 10 reps  - Sidelying Shoulder ER with Towel and Dumbbell  - 3 x weekly - 2-3 sets - work to fatigue hold  - Seated Single Arm Shoulder Abduction and External Rotation with Dumbbell  - 3 x weekly - 2-3 sets - work to fatigue hold  - Supine 90/90 Overhead Dumbbell Raise  - 3 x weekly - 2-3 sets - 10-5 reps  - Clam with Resistance  - 3 x weekly - 2-3 sets - 15 reps - 3 sec hold  - Marching Bridge  - 3 x weekly - 2-3 sets - 15 reps - 1-2 hold  - Standing Shoulder Extension with Dowel  - 2 x daily - 7 x weekly - 2 sets - 10  "reps  - Prone [place]  - 1 x daily - 3 sets - 10 reps  - Supine Bridge with Heels on Swiss Ball and Knees Bent  - 3 x weekly - 2 sets - 10-15 reps  - Standing Hip Hiking  - 3 x weekly - 2 sets - 10-15 reps  - Forward Monster Walks  - 3 x weekly  - Backward Monster Walks  - 3 x weekly          Time-based treatments/modalities:    Physical Therapy Timed Treatment Charges  Therapeutic exercise minutes (CPT 26002): 44 minutes  Pain pre treatment: 2/10     ASSESSMENT:   Response to treatment:   Pt is currently 17 weeks s/p L shoulder arthroscopy, tendon repair including both supra and infra tendons, extensive debridement, subacromial decompression and open L biceps tenodesis (3/1/23). Discussion with Dr. Galaviz on 5/3, per surgeon, reporting pt currently has no precautions and let \"pain be the guide.\"     Maintained ROM improvements from last session with good response to GH extension mobility ex. Will continue per current POC with emphasis on end range mobilization and SA strengthening for shoulder power. Proximal pelvic girdle weakness with decreased SL balance on R>L. Discussion re: trial of BFR for R knee.      PLAN/RECOMMENDATIONS:   Plan for treatment: continue with current POC.  Planned interventions for next visit: continue with current treatment.  Trial BFR        "

## 2023-06-27 DIAGNOSIS — M13.0 POLYARTHRITIS: ICD-10-CM

## 2023-06-29 ENCOUNTER — PHYSICAL THERAPY (OUTPATIENT)
Dept: PHYSICAL THERAPY | Facility: MEDICAL CENTER | Age: 38
End: 2023-06-29
Attending: FAMILY MEDICINE
Payer: COMMERCIAL

## 2023-06-29 DIAGNOSIS — M25.561 CHRONIC PAIN OF RIGHT KNEE: ICD-10-CM

## 2023-06-29 DIAGNOSIS — M75.122 COMPLETE TEAR OF LEFT ROTATOR CUFF, UNSPECIFIED WHETHER TRAUMATIC: ICD-10-CM

## 2023-06-29 DIAGNOSIS — G89.29 CHRONIC PAIN OF RIGHT KNEE: ICD-10-CM

## 2023-06-29 PROCEDURE — 97110 THERAPEUTIC EXERCISES: CPT

## 2023-06-29 NOTE — OP THERAPY DAILY TREATMENT
Outpatient Physical Therapy  DAILY TREATMENT     [facility] Outpatient Physical Therapy  [address]    [place] [address]  Phone:  [phone]  Fax:  [phone]    Date: 06/29/2023    Patient: Bassem Wilson  YOB: 1985  MRN: [id number]     Time Calculation    Start time: 0733  Stop time: 0812 Time Calculation (min): 39 minutes         Chief Complaint: Knee Problem and Shoulder Problem    Visit #: 16    SUBJECTIVE:  Pt reporting shoulder feels good, knee discomfort has been improving. Is noticing improved stability with ball exercises after glute med exercises at home.       OBJECTIVE:  Current objective measures:   Forward head and rounded shoulders      Objective findings and assessment details:    L shoulder AROM seated pre-treatment:  160 deg GH flexion   173 deg GH abd  45 deg GH ER  53 deg extension in standing (56 deg RUE)        Therapeutic Exercises (CPT 76990):     2. hep as below    3. TRX rows, 10x, good stability at pelvis    5. SA overhead with band, orange TB, 2x5, fatigue in OH with loss of GH ER stabiltiy, becky with incr reps    6. SA lift off with yoga block in Qped, 10x ea, incr diffiuclty on LLE with sig muscle juddering    7. stir the pot+ball on knees, 30 sec CW and CCW, fatigue; progression with one LE extended    9. shoulder extension mobilization at bar, x1 min, reviewed; improved shoulder extension following    10. pulleys, GH flexion and GH IR, x5 min, NT    11. glute med eccentrics-hip hike at bar, 10x ea, reviewed    12. SA overhead with dumbbell, 10#, x15 hooklying, reviewed    17. dead bug with swissball- alt arms and LE's indiv, 5x ea UE and LE, fatigue    18. hip abd at wall isometric, 30 sec at wall ea, fatigue      Therapeutic Exercise Summary: Access Code: [email or web address]  URL: [email or web address]  Date: 06/26/2023  Prepared by: Gabe Cortés    Exercises  - Correct Seated Posture  - 7 x weekly  - Standing  Shoulder Scaption  - 1 x daily - 7 x weekly - 2 sets - 10 reps  - Thoracic Extension Mobilization on Foam Roll  - 2 x daily - 7 x weekly - 2 sets - 10 reps  - Standing Row with Resistance  - 3 x weekly - 2 sets - 10-15 reps - 2-3 sec hold  - Lat pull downs  - 3 x weekly - 2 sets - 10-15 reps - 2-3 sec hold  - Bridge with Arms Out and Feet on Swiss Ball  - 3 x weekly - 3 sets - 10-15 reps  - WITY's  - 3-4 x weekly - 2-3 sets - 10-15 reps  - Doorway Pec Stretch at 90 Degrees Abduction  - 2-3 x daily - 7 x weekly - 3 sets - 30 sec hold  - Shoulder External Rotation and Scapular Retraction with Resistance  - 3 x weekly - 1 sets - 2 min or endurance hold  - Quadruped Alternating Shoulder Flexion  - 3 x weekly - 1 sets - 10 reps  - Sidelying Thoracic Lumbar Rotation  - 2 x daily - 7 x weekly - 1-2 sets - 10 reps  - Sleeper Stretch  - 2-3 x daily - 7 x weekly - 3 sets - 30 sec hold  - Wall [place]  - 2 x daily - 3 x weekly - 2 sets - 10 reps  - Sidelying Shoulder ER with Towel and Dumbbell  - 3 x weekly - 2-3 sets - work to fatigue hold  - Seated Single Arm Shoulder Abduction and External Rotation with Dumbbell  - 3 x weekly - 2-3 sets - work to fatigue hold  - Supine 90/90 Overhead Dumbbell Raise  - 3 x weekly - 2-3 sets - 10-5 reps  - Clam with Resistance  - 3 x weekly - 2-3 sets - 15 reps - 3 sec hold  - Marching Bridge  - 3 x weekly - 2-3 sets - 15 reps - 1-2 hold  - Standing Shoulder Extension with Dowel  - 2 x daily - 7 x weekly - 2 sets - 10 reps  - Prone [place]  - 1 x daily - 3 sets - 10 reps  - Supine Bridge with Heels on Swiss Ball and Knees Bent  - 3 x weekly - 2 sets - 10-15 reps  - Standing Hip Hiking  - 3 x weekly - 2 sets - 10-15 reps  - Forward Monster Walks  - 3 x weekly  - Backward Monster Walks  - 3 x weekly          Time-based treatments/modalities:    Physical Therapy Timed Treatment Charges  Therapeutic exercise minutes (CPT 74473): 39 minutes    Pain pre treatment: 2/10     ASSESSMENT:   Response to  "treatment:   Pt is currently 17 weeks s/p L shoulder arthroscopy, tendon repair including both supra and infra tendons, extensive debridement, subacromial decompression and open L biceps tenodesis (3/1/23). Discussion with Dr. Galaviz on 5/3, per surgeon, reporting pt currently has no precautions and let \"pain be the guide.\"     Improving strength and motor unit recruitment globally at B shoulders/periscapular region. Despite improvements, poor endurance, and fatigue in Qped and OH positions, normal for this stage of rehab. Pt fatigued by end of session without shoulder pain. Pt is progressing well and may benefit from further review of current exercises before additional advancement.       PLAN/RECOMMENDATIONS:   Plan for treatment: continue with current POC.  Planned interventions for next visit: continue with current treatment.  Trial BFR at knee       "

## 2023-07-01 DIAGNOSIS — E29.1 HYPOGONADISM IN MALE: ICD-10-CM

## 2023-07-03 ENCOUNTER — TELEPHONE (OUTPATIENT)
Dept: MEDICAL GROUP | Age: 38
End: 2023-07-03

## 2023-07-03 ENCOUNTER — PHYSICAL THERAPY (OUTPATIENT)
Dept: PHYSICAL THERAPY | Facility: MEDICAL CENTER | Age: 38
End: 2023-07-03
Attending: FAMILY MEDICINE
Payer: COMMERCIAL

## 2023-07-03 DIAGNOSIS — M75.122 COMPLETE TEAR OF LEFT ROTATOR CUFF, UNSPECIFIED WHETHER TRAUMATIC: ICD-10-CM

## 2023-07-03 DIAGNOSIS — M25.561 CHRONIC PAIN OF RIGHT KNEE: ICD-10-CM

## 2023-07-03 DIAGNOSIS — G89.29 CHRONIC PAIN OF RIGHT KNEE: ICD-10-CM

## 2023-07-03 DIAGNOSIS — E78.00 PURE HYPERCHOLESTEROLEMIA: ICD-10-CM

## 2023-07-03 PROCEDURE — 97110 THERAPEUTIC EXERCISES: CPT

## 2023-07-03 PROCEDURE — RXMED WILLOW AMBULATORY MEDICATION CHARGE: Performed by: FAMILY MEDICINE

## 2023-07-03 NOTE — OP THERAPY DAILY TREATMENT
Outpatient Physical Therapy  DAILY TREATMENT     [facility] Outpatient [facility]  [address]    [place] [address]  Phone:  [phone]  Fax:  [phone]    Date: 07/03/2023    Patient: Bassem Wilson  YOB: 1985  MRN: [id number]     Time Calculation    Start time: 0730              Chief Complaint: Shoulder Problem and Knee Problem    Visit #: 17    SUBJECTIVE:  Pt reporting knee and shoulder are both feeling great. Is not having any knee or shoulder pain currently. Endorses post tib tendon tear on R in past.       OBJECTIVE:  Current objective measures:   Forward head and rounded shoulders      Objective findings and assessment details:    L shoulder AROM seated pre-treatment:  160 deg GH flexion   173 deg GH abd  45 deg GH ER  53 deg extension in standing (56 deg RUE)      Therapeutic Exercises (CPT 79424):     1. BFR cuff at prox R thigh+upright bike to fatigue, to fatigue, 140 mm Hg pressure at cuff    2. step downs off step, 4 in step; 2x10 ea, dynamic genu valgum, shoes doffed    4. wall sit, 1 round to fatigue    6. Sl cone tap, x10 ea, muscle juddering with instability at ankles B    7. stir the pot+ball on knees, 30 sec CW and CCW, fatigue; progression with one LE extended    8. foot doming in sitting->big toe flexion and sit to stands, x2 min; x 10    14. TRX rows, 4x20, reviewed; VC's for TrA draw in    18. hip abd at wall isometric->running man, 30 sec->x10 ea, fatigue      Therapeutic Exercise Summary: Access Code: [email or web address]  URL: [email or web address]  Date: 06/26/2023  Prepared by: Gabe Cortés    Exercises  - Correct Seated Posture  - 7 x weekly  - Standing Shoulder Scaption  - 1 x daily - 7 x weekly - 2 sets - 10 reps  - Thoracic Extension Mobilization on Foam Roll  - 2 x daily - 7 x weekly - 2 sets - 10 reps  - Standing Row with Resistance  - 3 x weekly - 2 sets - 10-15 reps - 2-3 sec hold  - Lat pull downs  - 3 x  "weekly - 2 sets - 10-15 reps - 2-3 sec hold  - Bridge with Arms Out and Feet on Swiss Ball  - 3 x weekly - 3 sets - 10-15 reps  - WITY's  - 3-4 x weekly - 2-3 sets - 10-15 reps  - Doorway Pec Stretch at 90 Degrees Abduction  - 2-3 x daily - 7 x weekly - 3 sets - 30 sec hold  - Shoulder External Rotation and Scapular Retraction with Resistance  - 3 x weekly - 1 sets - 2 min or endurance hold  - Quadruped Alternating Shoulder Flexion  - 3 x weekly - 1 sets - 10 reps  - Sidelying Thoracic Lumbar Rotation  - 2 x daily - 7 x weekly - 1-2 sets - 10 reps  - Sleeper Stretch  - 2-3 x daily - 7 x weekly - 3 sets - 30 sec hold  - Wall [place]  - 2 x daily - 3 x weekly - 2 sets - 10 reps  - Sidelying Shoulder ER with Towel and Dumbbell  - 3 x weekly - 2-3 sets - work to fatigue hold  - Seated Single Arm Shoulder Abduction and External Rotation with Dumbbell  - 3 x weekly - 2-3 sets - work to fatigue hold  - Supine 90/90 Overhead Dumbbell Raise  - 3 x weekly - 2-3 sets - 10-5 reps  - Clam with Resistance  - 3 x weekly - 2-3 sets - 15 reps - 3 sec hold  - Marching Bridge  - 3 x weekly - 2-3 sets - 15 reps - 1-2 hold  - Standing Shoulder Extension with Dowel  - 2 x daily - 7 x weekly - 2 sets - 10 reps  - Prone [place]  - 1 x daily - 3 sets - 10 reps  - Supine Bridge with Heels on Swiss Ball and Knees Bent  - 3 x weekly - 2 sets - 10-15 reps  - Standing Hip Hiking  - 3 x weekly - 2 sets - 10-15 reps  - Forward Monster Walks  - 3 x weekly  - Backward Monster Walks  - 3 x weekly          Time-based treatments/modalities:         Pain pre treatment: 0/10     ASSESSMENT:   Response to treatment:   Pt is currently 18 weeks s/p L shoulder arthroscopy, tendon repair including both supra and infra tendons, extensive debridement, subacromial decompression and open L biceps tenodesis (3/1/23). Discussion with Dr. Galavzi on 5/3, per surgeon, reporting pt currently has no precautions and let \"pain be the guide.\"     Emphasis on R knee pain " today. Dynamic genu valgum with incr challenges on LLE during SL challenges. Drop of medial arch at B feet with shoes doffed to help focus on neuro re-ed and strengthening to foot intrinsics.  Pt fatigued by end of session. Will continue to work on stabilization and incorporate strengthening by utilizing muscular slings in subsequent sessions.     PLAN/RECOMMENDATIONS:   Plan for treatment: continue with current POC.  Planned interventions for next visit: continue with current treatment.

## 2023-07-06 ENCOUNTER — PHYSICAL THERAPY (OUTPATIENT)
Dept: PHYSICAL THERAPY | Facility: MEDICAL CENTER | Age: 38
End: 2023-07-06
Attending: FAMILY MEDICINE
Payer: COMMERCIAL

## 2023-07-06 DIAGNOSIS — G89.29 CHRONIC PAIN OF RIGHT KNEE: ICD-10-CM

## 2023-07-06 DIAGNOSIS — M25.561 CHRONIC PAIN OF RIGHT KNEE: ICD-10-CM

## 2023-07-06 PROCEDURE — 97110 THERAPEUTIC EXERCISES: CPT

## 2023-07-06 PROCEDURE — 97140 MANUAL THERAPY 1/> REGIONS: CPT

## 2023-07-06 RX ORDER — ROSUVASTATIN CALCIUM 20 MG/1
20 TABLET, COATED ORAL EVERY EVENING
Qty: 90 TABLET | Refills: 2 | Status: SHIPPED | OUTPATIENT
Start: 2023-07-06 | End: 2024-03-03 | Stop reason: SDUPTHER

## 2023-07-06 NOTE — OP THERAPY DAILY TREATMENT
Outpatient Physical Therapy  DAILY TREATMENT     [facility] Outpatient Physical Therapy  [address]    [place] [address]  Phone:  [phone]  Fax:  [phone]    Date: 07/06/2023    Patient: Bassem Wilson  YOB: 1985  MRN: [id number]     Time Calculation    Start time: 0735  Stop time: 0813 Time Calculation (min): 38 minutes         Chief Complaint: No chief complaint on file.    Visit #: 18    SUBJECTIVE:  Pt reporting knee was sore following his hike, noticed pain almost immediately. However, recovery after a few hours vs. days as in the past. Reporting shoulder feels fine today and getting stronger.      OBJECTIVE:  Current objective measures:      Objective findings and assessment details:    L shoulder AROM seated pre-treatment:  165 deg GH flexion   174 deg GH abd  46 deg GH ER  55 deg extension in standing (56 deg RUE)      Therapeutic Exercises (CPT 87547):     1. BFR cuff at prox R thigh+upright bike to fatigue, to fatigue, 140 mm Hg pressure at cuff    4. wall sit, 1 round to fatigue    6. Sl cone tap, x10 ea, NT    7. stir the pot+ball on knees, 30 sec CW and CCW, NT    9. GH ER in SL, x10, 3# dumbbell, corrections for SL set up    10. SL bridge with chest press SL contral limb, x10 ea 12# dumbbell    11. SL bridge with B lat pull down supine, x10 ea, 2 bands on mat    12. incline plank with row, x10, 12# dumbbell ea, emphasis on periscap    14. TRX rows, x15, reviewed; good trunk stability    17. draw the sword, orange ->pink TB, x10, good stability, slight muscle juddering noted    18. ball roll Qped at end range flexion and abd, 30 sec ea, fatigue      Therapeutic Exercise Summary: Access Code: [email or web address]  URL: [email or web address]  Date: 06/26/2023  Prepared by: Gabe Cortés    Exercises  - Correct Seated Posture  - 7 x weekly  - Standing Shoulder Scaption  - 1 x daily - 7 x weekly - 2 sets - 10 reps  - Thoracic Extension  Mobilization on Foam Roll  - 2 x daily - 7 x weekly - 2 sets - 10 reps  - Standing Row with Resistance  - 3 x weekly - 2 sets - 10-15 reps - 2-3 sec hold  - Lat pull downs  - 3 x weekly - 2 sets - 10-15 reps - 2-3 sec hold  - Bridge with Arms Out and Feet on Swiss Ball  - 3 x weekly - 3 sets - 10-15 reps  - WITY's  - 3-4 x weekly - 2-3 sets - 10-15 reps  - Doorway Pec Stretch at 90 Degrees Abduction  - 2-3 x daily - 7 x weekly - 3 sets - 30 sec hold  - Shoulder External Rotation and Scapular Retraction with Resistance  - 3 x weekly - 1 sets - 2 min or endurance hold  - Quadruped Alternating Shoulder Flexion  - 3 x weekly - 1 sets - 10 reps  - Sidelying Thoracic Lumbar Rotation  - 2 x daily - 7 x weekly - 1-2 sets - 10 reps  - Sleeper Stretch  - 2-3 x daily - 7 x weekly - 3 sets - 30 sec hold  - Wall [place]  - 2 x daily - 3 x weekly - 2 sets - 10 reps  - Sidelying Shoulder ER with Towel and Dumbbell  - 3 x weekly - 2-3 sets - work to fatigue hold  - Seated Single Arm Shoulder Abduction and External Rotation with Dumbbell  - 3 x weekly - 2-3 sets - work to fatigue hold  - Supine 90/90 Overhead Dumbbell Raise  - 3 x weekly - 2-3 sets - 10-5 reps  - Clam with Resistance  - 3 x weekly - 2-3 sets - 15 reps - 3 sec hold  - Marching Bridge  - 3 x weekly - 2-3 sets - 15 reps - 1-2 hold  - Standing Shoulder Extension with Dowel  - 2 x daily - 7 x weekly - 2 sets - 10 reps  - Prone [place]  - 1 x daily - 3 sets - 10 reps  - Supine Bridge with Heels on Swiss Ball and Knees Bent  - 3 x weekly - 2 sets - 10-15 reps  - Standing Hip Hiking  - 3 x weekly - 2 sets - 10-15 reps  - Forward Monster Walks  - 3 x weekly  - Backward Monster Walks  - 3 x weekly          Time-based treatments/modalities:    Physical Therapy Timed Treatment Charges  Manual therapy minutes (CPT 40766): 8 minutes  Therapeutic exercise minutes (CPT 76261): 30 minutes    Pain pre treatment: 0/10     ASSESSMENT:   Response to treatment:   Pt is currently 18 weeks  "s/p L shoulder arthroscopy, tendon repair including both supra and infra tendons, extensive debridement, subacromial decompression and open L biceps tenodesis (3/1/23). Discussion with Dr. Galaviz on 5/3, per surgeon, reporting pt currently has no precautions and let \"pain be the guide.\"     Pt progressing well overall. Strength and mechanics have improved drastically since evaluation. Has maintained shoulder ROM improvements. Does continue to demo hypomobility with scapular mobility, becky into upward rotation and abd, possibility due to posturing or work requirements. Pt is currently being seen for two impairments. Will continue to focus on shoulder rehab and R knee complaints in alternating and cohesive manner by utilizing muscular slings in upcoming sessions.       PLAN/RECOMMENDATIONS:   Plan for treatment: continue with current POC.  Planned interventions for next visit: continue with current treatment.  Repeat BFR, compliant surface training due to knee pain with hikes, muscular sling muscle training       "

## 2023-07-07 ENCOUNTER — PHARMACY VISIT (OUTPATIENT)
Dept: PHARMACY | Facility: MEDICAL CENTER | Age: 38
End: 2023-07-07
Payer: COMMERCIAL

## 2023-07-07 PROCEDURE — RXMED WILLOW AMBULATORY MEDICATION CHARGE: Performed by: FAMILY MEDICINE

## 2023-07-10 ENCOUNTER — APPOINTMENT (OUTPATIENT)
Dept: PHYSICAL THERAPY | Facility: MEDICAL CENTER | Age: 38
End: 2023-07-10
Attending: FAMILY MEDICINE
Payer: COMMERCIAL

## 2023-07-13 ENCOUNTER — PHYSICAL THERAPY (OUTPATIENT)
Dept: PHYSICAL THERAPY | Facility: MEDICAL CENTER | Age: 38
End: 2023-07-13
Attending: FAMILY MEDICINE
Payer: COMMERCIAL

## 2023-07-13 DIAGNOSIS — M25.561 CHRONIC PAIN OF RIGHT KNEE: ICD-10-CM

## 2023-07-13 DIAGNOSIS — M75.122 COMPLETE TEAR OF LEFT ROTATOR CUFF, UNSPECIFIED WHETHER TRAUMATIC: ICD-10-CM

## 2023-07-13 DIAGNOSIS — G89.29 CHRONIC PAIN OF RIGHT KNEE: ICD-10-CM

## 2023-07-13 PROCEDURE — 97110 THERAPEUTIC EXERCISES: CPT

## 2023-07-13 NOTE — OP THERAPY DAILY TREATMENT
Outpatient Physical Therapy  DAILY TREATMENT     [facility] Outpatient Physical Therapy  [address]    [place] [address]  Phone:  [phone]  Fax:  [phone]    Date: 07/13/2023    Patient: Bassem Wilson  YOB: 1985  MRN: [id number]     Time Calculation    Start time: 0732  Stop time: 0808 Time Calculation (min): 36 minutes         Chief Complaint: Knee Problem and Shoulder Problem    Visit #: 19    SUBJECTIVE:  Pt reporting knee was a little sore after a five mile hike but not like it used to be.       OBJECTIVE:  Current objective measures:      Objective findings and assessment details:    L shoulder AROM seated pre-treatment:  160 deg GH flexion   178 deg GH abd  46 deg GH ER  55 deg extension in standing (56 deg RUE)      Therapeutic Exercises (CPT 42632):     1. BFR cuff at prox R thigh+upright bike to fatigue, to fatigue, 140 mm Hg pressure at cuff- NT    10. SL squat from plinth, x10 ea, requires contral foot on floor with RLE, dynamic genu valgum    11. step downs, 4 in step x10, fatigue with dynamic genu valgum R>L    12. TRX dragon fly, 10x, fatigue    13. bird dog, 10x, VC's for core and decreased leg raise    14. TRX fly's I, T, Y's, x10 ea, fatigue    15. OH press using dumbbell -stability emphasis, 10# dumbbell L only    17. draw the sword, orange ->pink TB, x10, good stability, slight muscle juddering noted    19. mini squats on Bosu      Therapeutic Exercise Summary: Access Code: [email or web address]  URL: [email or web address]  Date: 07/13/2023  Prepared by: Gabe Cortés    Exercises  - Correct Seated Posture  - 7 x weekly  - Thoracic Extension Mobilization on Foam Roll  - 2 x daily - 7 x weekly - 2 sets - 10 reps  - Standing Row with Resistance  - 3 x weekly - 2 sets - 10-15 reps - 2-3 sec hold  - Lat pull downs  - 3 x weekly - 2 sets - 10-15 reps - 2-3 sec hold  - Bridge with Arms Out and Feet on Swiss Ball  - 3 x weekly - 3  sets - 10-15 reps  - WITY's  - 3-4 x weekly - 2-3 sets - 10-15 reps  - Doorway Pec Stretch at 90 Degrees Abduction  - 2-3 x daily - 7 x weekly - 3 sets - 30 sec hold  - Shoulder External Rotation and Scapular Retraction with Resistance  - 3 x weekly - 1 sets - 2 min or endurance hold  - Quadruped Alternating Shoulder Flexion  - 3 x weekly - 1 sets - 10 reps  - Sidelying Thoracic Lumbar Rotation  - 2 x daily - 7 x weekly - 1-2 sets - 10 reps  - Sleeper Stretch  - 2-3 x daily - 7 x weekly - 3 sets - 30 sec hold  - Wall [place]  - 2 x daily - 3 x weekly - 2 sets - 10 reps  - Sidelying Shoulder ER with Towel and Dumbbell  - 3 x weekly - 2-3 sets - work to fatigue hold  - Seated Single Arm Shoulder Abduction and External Rotation with Dumbbell  - 3 x weekly - 2-3 sets - work to fatigue hold  - Supine 90/90 Overhead Dumbbell Raise  - 3 x weekly - 2-3 sets - 10-5 reps  - Clam with Resistance  - 3 x weekly - 2-3 sets - 15 reps - 3 sec hold  - Marching Bridge  - 3 x weekly - 2-3 sets - 15 reps - 1-2 hold  - Standing Shoulder Extension with Dowel  - 2 x daily - 7 x weekly - 2 sets - 10 reps  - Prone [place]  - 1 x daily - 3 sets - 10 reps  - Supine Bridge with Heels on Swiss Ball and Knees Bent  - 3 x weekly - 2 sets - 10-15 reps  - Standing Hip Hiking  - 3 x weekly - 2 sets - 10-15 reps  - Forward Monster Walks  - 3 x weekly  - Backward Monster Walks  - 3 x weekly  - Bird Dog  - 3 x weekly - 1-2 sets - 10 reps  - Single Leg Sit to Stand with Arms Extended  - 3 x weekly - 1-2 sets - 10 reps    Pt performed these exercises with instruction and SPV.  Provided handout with these exercises for daily HEP.          Time-based treatments/modalities:    Physical Therapy Timed Treatment Charges  Therapeutic exercise minutes (CPT 30170): 36 minutes    Pain pre treatment: 0/10     ASSESSMENT:   Response to treatment:   Pt is currently 19 weeks s/p L shoulder arthroscopy, tendon repair including both supra and infra tendons, extensive  "debridement, subacromial decompression and open L biceps tenodesis (3/1/23). Discussion with Dr. Galaviz on 5/3, per surgeon, reporting pt currently has no precautions and let \"pain be the guide.\"     Continuing to perform well with good tolerance to increasing challenges to both UE and LE. Instability with OH secondary to weakness and dynamic genu valgus with SL R>LLE. Will initiate gentle plyometrics in upcoming sessions.        PLAN/RECOMMENDATIONS:   Plan for treatment: continue with current POC.  Planned interventions for next visit: continue with current treatment.  Repeat BFR, compliant surface training due to knee pain with hikes, muscular sling muscle training  Resisted sport cord training; start some gentle plyometrics training    "

## 2023-07-17 ENCOUNTER — PHYSICAL THERAPY (OUTPATIENT)
Dept: PHYSICAL THERAPY | Facility: MEDICAL CENTER | Age: 38
End: 2023-07-17
Attending: FAMILY MEDICINE
Payer: COMMERCIAL

## 2023-07-17 DIAGNOSIS — M75.122 COMPLETE TEAR OF LEFT ROTATOR CUFF, UNSPECIFIED WHETHER TRAUMATIC: ICD-10-CM

## 2023-07-17 DIAGNOSIS — G89.29 CHRONIC PAIN OF RIGHT KNEE: ICD-10-CM

## 2023-07-17 DIAGNOSIS — M25.561 CHRONIC PAIN OF RIGHT KNEE: ICD-10-CM

## 2023-07-17 PROCEDURE — 97110 THERAPEUTIC EXERCISES: CPT

## 2023-07-17 NOTE — OP THERAPY DAILY TREATMENT
Outpatient Physical Therapy  DAILY TREATMENT     [facility] Outpatient Physical Therapy  [address]    [place] [address]  Phone:  [phone]  Fax:  [phone]    Date: 07/17/2023    Patient: Bassem Wilson  YOB: 1985  MRN: [id number]     Time Calculation    Start time: 0733  Stop time: 0814 Time Calculation (min): 41 minutes         Chief Complaint: Shoulder Problem and Knee Problem    Visit #: 20    SUBJECTIVE:  Pt reporting shoulder feels weaker than the right with activities; noticing fatigue during pickleball even though he is right handed. States his knee is feeling better but has discomfort after pickleball and hiking.       OBJECTIVE:  Current objective measures:      Objective findings and assessment details prev session:   L shoulder AROM seated pre-treatment:  160 deg GH flexion   178 deg GH abd  46 deg GH ER  55 deg extension in standing (56 deg RUE)      Therapeutic Exercises (CPT 71844):     3. self GH ER in lying with band, 2x30 sec, hep; difficulty due to loss of range    4. GH ER 90/90, 1x10, fatigue with difficulty due to loss of range    5. GH IR 90/90, 1x10    6. SL bridge with contral UE raise, x10 ea    7. Step and throw, pink TB x10    8. mini squat and ball throw at wall, x10      Therapeutic Exercise Summary: Access Code: [email or web address]  URL: [email or web address]  Date: 07/13/2023  Prepared by: Gabe Cortés    Exercises  - Correct Seated Posture  - 7 x weekly  - Thoracic Extension Mobilization on Foam Roll  - 2 x daily - 7 x weekly - 2 sets - 10 reps  - Standing Row with Resistance  - 3 x weekly - 2 sets - 10-15 reps - 2-3 sec hold  - Lat pull downs  - 3 x weekly - 2 sets - 10-15 reps - 2-3 sec hold  - Bridge with Arms Out and Feet on Swiss Ball  - 3 x weekly - 3 sets - 10-15 reps  - WITY's  - 3-4 x weekly - 2-3 sets - 10-15 reps  - Doorway Pec Stretch at 90 Degrees Abduction  - 2-3 x daily - 7 x weekly - 3 sets - 30  sec hold  - Shoulder External Rotation and Scapular Retraction with Resistance  - 3 x weekly - 1 sets - 2 min or endurance hold  - Quadruped Alternating Shoulder Flexion  - 3 x weekly - 1 sets - 10 reps  - Sidelying Thoracic Lumbar Rotation  - 2 x daily - 7 x weekly - 1-2 sets - 10 reps  - Sleeper Stretch  - 2-3 x daily - 7 x weekly - 3 sets - 30 sec hold  - Wall [place]  - 2 x daily - 3 x weekly - 2 sets - 10 reps  - Sidelying Shoulder ER with Towel and Dumbbell  - 3 x weekly - 2-3 sets - work to fatigue hold  - Seated Single Arm Shoulder Abduction and External Rotation with Dumbbell  - 3 x weekly - 2-3 sets - work to fatigue hold  - Supine 90/90 Overhead Dumbbell Raise  - 3 x weekly - 2-3 sets - 10-5 reps  - Clam with Resistance  - 3 x weekly - 2-3 sets - 15 reps - 3 sec hold  - Marching Bridge  - 3 x weekly - 2-3 sets - 15 reps - 1-2 hold  - Standing Shoulder Extension with Dowel  - 2 x daily - 7 x weekly - 2 sets - 10 reps  - Prone [place]  - 1 x daily - 3 sets - 10 reps  - Supine Bridge with Heels on Swiss Ball and Knees Bent  - 3 x weekly - 2 sets - 10-15 reps  - Standing Hip Hiking  - 3 x weekly - 2 sets - 10-15 reps  - Forward Monster Walks  - 3 x weekly  - Backward Monster Walks  - 3 x weekly  - Bird Dog  - 3 x weekly - 1-2 sets - 10 reps  - Single Leg Sit to Stand with Arms Extended  - 3 x weekly - 1-2 sets - 10 reps    Pt performed these exercises with instruction and SPV.  Provided handout with these exercises for daily HEP.          Time-based treatments/modalities:    Physical Therapy Timed Treatment Charges  Therapeutic exercise minutes (CPT 23497): 41 minutes    Pain pre treatment: 0/10     ASSESSMENT:   Response to treatment:   Pt is currently 20 weeks s/p L shoulder arthroscopy, tendon repair including both supra and infra tendons, extensive debridement, subacromial decompression and open L biceps tenodesis (3/1/23).     Cont'd GH ER ROM and strength deficit, compensating with l/s lordosis. Provided  pt with self mobility ex and strength at 90/90 to help address. Will emphasize indep hep for next few weeks and follow up in the next month to progress pt's exercise and continue core and LE coordination with UE.       PLAN/RECOMMENDATIONS:   Plan for treatment: continue with current POC.  Planned interventions for next visit: continue with current treatment.  Reassess GH ER and strength; progress as able with plan above

## 2023-07-20 ENCOUNTER — APPOINTMENT (OUTPATIENT)
Dept: PHYSICAL THERAPY | Facility: MEDICAL CENTER | Age: 38
End: 2023-07-20
Attending: FAMILY MEDICINE
Payer: COMMERCIAL

## 2023-07-24 ENCOUNTER — APPOINTMENT (OUTPATIENT)
Dept: PHYSICAL THERAPY | Facility: MEDICAL CENTER | Age: 38
End: 2023-07-24
Attending: FAMILY MEDICINE
Payer: COMMERCIAL

## 2023-07-27 ENCOUNTER — APPOINTMENT (OUTPATIENT)
Dept: PHYSICAL THERAPY | Facility: MEDICAL CENTER | Age: 38
End: 2023-07-27
Attending: FAMILY MEDICINE
Payer: COMMERCIAL

## 2023-08-07 ENCOUNTER — PHYSICAL THERAPY (OUTPATIENT)
Dept: PHYSICAL THERAPY | Facility: MEDICAL CENTER | Age: 38
End: 2023-08-07
Attending: FAMILY MEDICINE
Payer: COMMERCIAL

## 2023-08-07 DIAGNOSIS — M75.122 COMPLETE TEAR OF LEFT ROTATOR CUFF, UNSPECIFIED WHETHER TRAUMATIC: ICD-10-CM

## 2023-08-07 DIAGNOSIS — G89.29 CHRONIC PAIN OF RIGHT KNEE: ICD-10-CM

## 2023-08-07 DIAGNOSIS — M25.561 CHRONIC PAIN OF RIGHT KNEE: ICD-10-CM

## 2023-08-07 PROCEDURE — 97110 THERAPEUTIC EXERCISES: CPT

## 2023-08-07 NOTE — OP THERAPY DAILY TREATMENT
Outpatient Physical Therapy  DAILY TREATMENT     [facility] Outpatient Physical Therapy  [address]    [place] [address]  Phone:  [phone]  Fax:  [phone]    Date: 08/07/2023    Patient: Bassem Wilson  YOB: 1985  MRN: [id number]     Time Calculation    Start time: 0730  Stop time: 0815 Time Calculation (min): 45 minutes         Chief Complaint: Shoulder Problem    Visit #: 21    SUBJECTIVE:  Pt reporting shoulder feels good just some stiffness and weakness, improving but not currently limiting ADL's.   C/o instability at R knee with downhill hiking and lateral movements during pickleball.    OBJECTIVE:  Current objective measures:   Strength at 0 deg GH abd:  GH ER: 4+  GH IR: 5  GH abd: 5  Abd flexion: 5    Strength at 90 deg GH abd:  GH ER: 4+  GH IR: 5  GH abd: 4+     strength:  L: 100, 99, 80  R: 91, 92, 87      Objective findings and assessment details prev session:   L shoulder AROM seated pre-treatment:  165 deg GH flexion   180 deg GH abd  45 deg GH ER (0 deg abd)  65 deg extension in standing (56 deg RUE)  Lacking about 20% full GH ER in 90/90 position       Therapeutic Exercises (CPT 14657):     12. step up 90/90 holds, 12 in step; 20# x10 ea, incr difficulty on LLE    13. cone tapping-Y excursion balance testing, 2x10 ea, difficulty with posterolateral R    14. dynamic cosack squats, x10 ea    15. curtsy squats, x10 ea, instability B L>R    16. grape vine, 2x25 ft, good form, no instability    17. shuffling, 2x25 ft, good form, no instability    18. runner's position ->90/90 in standing, x10 ea, slight instability noted without stepping reaction      Therapeutic Exercise Summary: Access Code: [email or web address]  URL: [email or web address]  Date: 07/13/2023  Prepared by: Gabe Cortés    Exercises  - Correct Seated Posture  - 7 x weekly  - Thoracic Extension Mobilization on Foam Roll  - 2 x daily - 7 x weekly - 2 sets - 10  reps  - Standing Row with Resistance  - 3 x weekly - 2 sets - 10-15 reps - 2-3 sec hold  - Lat pull downs  - 3 x weekly - 2 sets - 10-15 reps - 2-3 sec hold  - Bridge with Arms Out and Feet on Swiss Ball  - 3 x weekly - 3 sets - 10-15 reps  - WITY's  - 3-4 x weekly - 2-3 sets - 10-15 reps  - Doorway Pec Stretch at 90 Degrees Abduction  - 2-3 x daily - 7 x weekly - 3 sets - 30 sec hold  - Shoulder External Rotation and Scapular Retraction with Resistance  - 3 x weekly - 1 sets - 2 min or endurance hold  - Quadruped Alternating Shoulder Flexion  - 3 x weekly - 1 sets - 10 reps  - Sidelying Thoracic Lumbar Rotation  - 2 x daily - 7 x weekly - 1-2 sets - 10 reps  - Sleeper Stretch  - 2-3 x daily - 7 x weekly - 3 sets - 30 sec hold  - Wall [place]  - 2 x daily - 3 x weekly - 2 sets - 10 reps  - Sidelying Shoulder ER with Towel and Dumbbell  - 3 x weekly - 2-3 sets - work to fatigue hold  - Seated Single Arm Shoulder Abduction and External Rotation with Dumbbell  - 3 x weekly - 2-3 sets - work to fatigue hold  - Supine 90/90 Overhead Dumbbell Raise  - 3 x weekly - 2-3 sets - 10-5 reps  - Clam with Resistance  - 3 x weekly - 2-3 sets - 15 reps - 3 sec hold  - Marching Bridge  - 3 x weekly - 2-3 sets - 15 reps - 1-2 hold  - Standing Shoulder Extension with Dowel  - 2 x daily - 7 x weekly - 2 sets - 10 reps  - Prone [place]  - 1 x daily - 3 sets - 10 reps  - Supine Bridge with Heels on Swiss Ball and Knees Bent  - 3 x weekly - 2 sets - 10-15 reps  - Standing Hip Hiking  - 3 x weekly - 2 sets - 10-15 reps  - Forward Monster Walks  - 3 x weekly  - Backward Monster Walks  - 3 x weekly  - Bird Dog  - 3 x weekly - 1-2 sets - 10 reps  - Single Leg Sit to Stand with Arms Extended  - 3 x weekly - 1-2 sets - 10 reps    Pt performed these exercises with instruction and SPV.  Provided handout with these exercises for daily HEP.        Time-based treatments/modalities:    Physical Therapy Timed Treatment Charges  Therapeutic exercise  minutes (CPT 00755): 45 minutes    Pain pre treatment: 0/10     ASSESSMENT:   Response to treatment:   Pt is currently 23 weeks s/p L shoulder arthroscopy, tendon repair including both supra and infra tendons, extensive debridement, subacromial decompression and open L biceps tenodesis (3/1/23). Pt reporting he is doing well, demo improved GH ER becky in 90/90 position. Strength progressing as well with slight lack of GH ER and abd strength at 0 deg and 90 deg abd. Pt reporting no limitations with ADL's or activities with re: to shoulder; just some cont'd stiffness and strength; anticipate this will continue to improve with time. Slight instability on SL L>R with challenges in session; this is improving. Will continue to emphasize stability and dynamic training to LE with SL emphasis.       PLAN/RECOMMENDATIONS:   Plan for treatment: continue with current POC.  Planned interventions for next visit: continue with current treatment.  Reassess GH ER and strength; progress as able with plan above

## 2023-08-10 ENCOUNTER — APPOINTMENT (OUTPATIENT)
Dept: PHYSICAL THERAPY | Facility: MEDICAL CENTER | Age: 38
End: 2023-08-10
Attending: FAMILY MEDICINE
Payer: COMMERCIAL

## 2023-08-14 ENCOUNTER — APPOINTMENT (OUTPATIENT)
Dept: PHYSICAL THERAPY | Facility: MEDICAL CENTER | Age: 38
End: 2023-08-14
Attending: FAMILY MEDICINE
Payer: COMMERCIAL

## 2023-08-17 ENCOUNTER — PHYSICAL THERAPY (OUTPATIENT)
Dept: PHYSICAL THERAPY | Facility: MEDICAL CENTER | Age: 38
End: 2023-08-17
Attending: FAMILY MEDICINE
Payer: COMMERCIAL

## 2023-08-17 DIAGNOSIS — M25.561 CHRONIC PAIN OF RIGHT KNEE: ICD-10-CM

## 2023-08-17 DIAGNOSIS — G89.29 CHRONIC PAIN OF RIGHT KNEE: ICD-10-CM

## 2023-08-17 DIAGNOSIS — M75.122 COMPLETE TEAR OF LEFT ROTATOR CUFF, UNSPECIFIED WHETHER TRAUMATIC: ICD-10-CM

## 2023-08-17 PROCEDURE — 97110 THERAPEUTIC EXERCISES: CPT

## 2023-08-17 NOTE — OP THERAPY DISCHARGE SUMMARY
Outpatient Physical Therapy  DISCHARGE SUMMARY NOTE      [facility] Outpatient Physical Therapy  [address]  [place] [address]  Phone:  [phone]  Fax:  [phone]    Date of Visit: 08/17/2023    Patient: Bassem Wilson  YOB: 1985  MRN: [id number]     Referring Provider: Antwon Galaviz M.D.  [address] [facility]  [address]  [place] [address]   Referring Diagnosis Complete rotator cuff tear or rupture of left shoulder, not specified as traumatic [M75.122]         Functional Assessment Used  Quickdash General Total Score: 18.18     Your patient is being discharged from Physical Therapy with the following comments:   Goals met    Comments:  Pt is s/p L shoulder arthroscopy, tendon repair including both supra and infra tendons, extensive debridement, subacromial decompression, and open L biceps tenodesis (3/1/23). Pt currently compliant to his hep with no major reported limitations with his ADL's. Stating he has been feeling some stiffness in posterior shoulder recently since incorporating shadow boxing and changes in pickleball. Additional emphasis in sessions to address core and proximal pelvic girdle strengthening due to pain at R knee, improving strength and neuro motor control noted over past few weeks. Pt does demo slight deficits into GH ER ROM with other ranges grossly WFL for all ADL's. Strength and stability improved; does demo some weakness with stability in frontal plane and posteriorly with shoulders extended, updated hep for pt to work on this indep over the next few months. Pt has made excellent progress in therapy; anticipate cont'd improvements if compliant to hep. Pt encouraged to follow up in next few months as needed. Pt to be DC-ed from formal PT today with instructions to continue indep exercise program.     Objective:    Strength at 90 deg GH abd:  GH ER: 4+  GH IR: 5  GH abd: 4+    Objective findings and assessment  details prev session:   L shoulder AROM seated pre-treatment:  166 deg GH flexion   180 deg GH abd  44 deg GH ER (0 deg abd)  60 deg extension in standing (56 deg RUE)  Lacking about 20% full GH ER in 90/90 position     Hip strength MMT:  Glut med: 4+ B  Glute min: 4- B  Goals:   Short Term Goals:   1. Pt will obtain 6 deg or more in all planes at L GHJ to improve overall functioning. (Met)  2. Pt will demo increased glute dominant squat and eccentric control without pain at least 50% of the time. (Met)    Short term goal time span:  4-6 weeks      Long Term Goals:    1. Pt will be independent with written HEP. (Met)  2. Pt will have a sig improvement in Quickdash score >/= MDC of 10 pts or greater (eval: 43.18) (Met)  3. Pt will have sig improvement in WOMAC score (eval: 28.13)  (NT)  4. Pt will obtain 10 deg or more in all planes at L GHJ to improve overall functioning.  (Met)  5. Pt will demonstrate independence with periscapular and RC strengthening program without increase in discomfort in order to wean back into active lifestyle/weight lifting program at home.  (Met)  6.  Pt will demo increased glute dominant squat and eccentric control without pain at least 75% of the time in order to navigate stairs and uneven terrain.  (Partially Met)    Long term goal time span:  1-2 months    Recommendations:  Pt has satisfied above listed goals and is ready to DC today with independent hep. All questions answered and verbally reviewed independent hep with pt. Expressed to pt that he can return to skilled physical therapy if condition should change or worsen in future.      Gabe Cortés, PT    Date: 8/17/2023

## 2023-08-17 NOTE — OP THERAPY DAILY TREATMENT
Outpatient Physical Therapy  DAILY TREATMENT     [facility] Outpatient Physical Therapy  [address]    [place] [address]  Phone:  [phone]  Fax:  [phone]    Date: 08/17/2023    Patient: Bassem Wilson  YOB: 1985  MRN: [id number]     Time Calculation    Start time: 0735  Stop time: 0809 Time Calculation (min): 34 minutes         Chief Complaint: Knee Problem and Shoulder Problem    Visit #: 22    SUBJECTIVE:  Pt reporting he has attempted shadow boxing and double backhand with pickleball and feels a little stiffness in the back of the shoulder. Otherwise, no major complaints.         OBJECTIVE:  Current objective measures:   See DC note     Therapeutic Exercises (CPT 24333):     1. objective measures    2. Quickdash completion    3. review of hep    5. SL hip circles IR/ER, hip abd, hip flexion, pink->orange TB, x8 ea; changed to standing, cramping; hep in standing    7. reverse plank, DC-ed; replaced with dips    8. sidelying hip adduction isometric hold; knee flexed, x10 sec, hep    9. shoulder dips at plinth with knees flexed, x10, hep      Therapeutic Exercise Summary: Access Code: [email or web address]  URL: [email or web address]  Date: 08/17/2023  Prepared by: Gabe Cortés    Exercises  - Correct Seated Posture  - 7 x weekly  - Thoracic Extension Mobilization on Foam Roll  - 2 x daily - 7 x weekly - 2 sets - 10 reps  - Standing Row with Resistance  - 3 x weekly - 2 sets - 10-15 reps - 2-3 sec hold  - Lat pull downs  - 3 x weekly - 2 sets - 10-15 reps - 2-3 sec hold  - Bridge with Arms Out and Feet on Swiss Ball  - 3 x weekly - 3 sets - 10-15 reps  - WITY's  - 3-4 x weekly - 2-3 sets - 10-15 reps  - Doorway Pec Stretch at 90 Degrees Abduction  - 2-3 x daily - 7 x weekly - 3 sets - 30 sec hold  - Shoulder External Rotation and Scapular Retraction with Resistance  - 3 x weekly - 1 sets - 2 min or endurance hold  - Quadruped Alternating  Shoulder Flexion  - 3 x weekly - 1 sets - 10 reps  - Sidelying Thoracic Lumbar Rotation  - 2 x daily - 7 x weekly - 1-2 sets - 10 reps  - Sleeper Stretch  - 2-3 x daily - 7 x weekly - 3 sets - 30 sec hold  - Wall [place]  - 2 x daily - 3 x weekly - 2 sets - 10 reps  - Sidelying Shoulder ER with Towel and Dumbbell  - 3 x weekly - 2-3 sets - work to fatigue hold  - Seated Single Arm Shoulder Abduction and External Rotation with Dumbbell  - 3 x weekly - 2-3 sets - work to fatigue hold  - Supine 90/90 Overhead Dumbbell Raise  - 3 x weekly - 2-3 sets - 10-5 reps  - Clam with Resistance  - 3 x weekly - 2-3 sets - 15 reps - 3 sec hold  - Marching Bridge  - 3 x weekly - 2-3 sets - 15 reps - 1-2 hold  - Standing Shoulder Extension with Dowel  - 2 x daily - 7 x weekly - 2 sets - 10 reps  - Prone Mettler  - 1 x daily - 3 sets - 10 reps  - Supine Bridge with Heels on Swiss Ball and Knees Bent  - 3 x weekly - 2 sets - 10-15 reps  - Standing Hip Hiking  - 3 x weekly - 2 sets - 10-15 reps  - Forward Monster Walks  - 3 x weekly  - Backward Monster Walks  - 3 x weekly  - Bird Dog  - 3 x weekly - 1-2 sets - 10 reps  - Single Leg Sit to Stand with Arms Extended  - 3 x weekly - 1-2 sets - 10 reps  - Reverse Plank on Elbows  - 1 x weekly - 1 sets - 10 reps  - Sidelying Hip Adduction with Chair  - 1 x daily - 1 x weekly - 1 sets - 10 reps  - Side Plank on Knees  - 1 x daily - 1 x weekly - 1 sets - 10 reps    Pt performed these exercises with instruction and SPV.  Provided handout with these exercises for daily HEP.          Time-based treatments/modalities:    Physical Therapy Timed Treatment Charges  Therapeutic exercise minutes (CPT 73708): 34 minutes    Pain pre treatment: 0/10     ASSESSMENT:   Response to treatment:   See DC note      PLAN/RECOMMENDATIONS:   Plan for treatment: DC with indep hep

## 2023-08-21 ENCOUNTER — APPOINTMENT (OUTPATIENT)
Dept: PHYSICAL THERAPY | Facility: MEDICAL CENTER | Age: 38
End: 2023-08-21
Attending: FAMILY MEDICINE
Payer: COMMERCIAL

## 2023-08-24 ENCOUNTER — APPOINTMENT (OUTPATIENT)
Dept: PHYSICAL THERAPY | Facility: MEDICAL CENTER | Age: 38
End: 2023-08-24
Attending: FAMILY MEDICINE
Payer: COMMERCIAL

## 2023-08-28 ENCOUNTER — APPOINTMENT (OUTPATIENT)
Dept: PHYSICAL THERAPY | Facility: MEDICAL CENTER | Age: 38
End: 2023-08-28
Attending: FAMILY MEDICINE
Payer: COMMERCIAL

## 2023-08-31 ENCOUNTER — APPOINTMENT (OUTPATIENT)
Dept: PHYSICAL THERAPY | Facility: MEDICAL CENTER | Age: 38
End: 2023-08-31
Attending: FAMILY MEDICINE
Payer: COMMERCIAL

## 2023-09-07 ENCOUNTER — APPOINTMENT (OUTPATIENT)
Dept: PHYSICAL THERAPY | Facility: MEDICAL CENTER | Age: 38
End: 2023-09-07
Attending: FAMILY MEDICINE
Payer: COMMERCIAL

## 2023-09-11 ENCOUNTER — APPOINTMENT (OUTPATIENT)
Dept: PHYSICAL THERAPY | Facility: MEDICAL CENTER | Age: 38
End: 2023-09-11
Attending: FAMILY MEDICINE
Payer: COMMERCIAL

## 2023-09-14 ENCOUNTER — APPOINTMENT (OUTPATIENT)
Dept: PHYSICAL THERAPY | Facility: MEDICAL CENTER | Age: 38
End: 2023-09-14
Attending: FAMILY MEDICINE
Payer: COMMERCIAL

## 2023-09-18 ENCOUNTER — APPOINTMENT (OUTPATIENT)
Dept: PHYSICAL THERAPY | Facility: MEDICAL CENTER | Age: 38
End: 2023-09-18
Attending: FAMILY MEDICINE
Payer: COMMERCIAL

## 2023-09-21 ENCOUNTER — APPOINTMENT (OUTPATIENT)
Dept: PHYSICAL THERAPY | Facility: MEDICAL CENTER | Age: 38
End: 2023-09-21
Attending: FAMILY MEDICINE
Payer: COMMERCIAL

## 2023-09-25 ENCOUNTER — APPOINTMENT (OUTPATIENT)
Dept: PHYSICAL THERAPY | Facility: MEDICAL CENTER | Age: 38
End: 2023-09-25
Attending: FAMILY MEDICINE
Payer: COMMERCIAL

## 2023-09-28 ENCOUNTER — APPOINTMENT (OUTPATIENT)
Dept: PHYSICAL THERAPY | Facility: MEDICAL CENTER | Age: 38
End: 2023-09-28
Attending: FAMILY MEDICINE
Payer: COMMERCIAL

## 2023-09-28 DIAGNOSIS — E29.1 HYPOGONADISM IN MALE: ICD-10-CM

## 2023-09-28 PROCEDURE — RXMED WILLOW AMBULATORY MEDICATION CHARGE: Performed by: FAMILY MEDICINE

## 2023-09-28 RX ORDER — TESTOSTERONE CYPIONATE 200 MG/ML
200 INJECTION, SOLUTION INTRAMUSCULAR
Qty: 10 ML | Refills: 1 | Status: SHIPPED | OUTPATIENT
Start: 2023-09-28 | End: 2024-03-25

## 2023-10-03 ENCOUNTER — PHARMACY VISIT (OUTPATIENT)
Dept: PHARMACY | Facility: MEDICAL CENTER | Age: 38
End: 2023-10-03
Payer: COMMERCIAL

## 2023-12-19 ENCOUNTER — PHARMACY VISIT (OUTPATIENT)
Dept: PHARMACY | Facility: MEDICAL CENTER | Age: 38
End: 2023-12-19
Payer: COMMERCIAL

## 2023-12-19 PROCEDURE — RXMED WILLOW AMBULATORY MEDICATION CHARGE: Performed by: FAMILY MEDICINE

## 2024-03-03 DIAGNOSIS — E78.00 PURE HYPERCHOLESTEROLEMIA: ICD-10-CM

## 2024-03-05 RX ORDER — ROSUVASTATIN CALCIUM 20 MG/1
20 TABLET, COATED ORAL EVERY EVENING
Qty: 90 TABLET | Refills: 2 | Status: SHIPPED | OUTPATIENT
Start: 2024-03-05

## 2024-03-05 RX ORDER — ALBUTEROL SULFATE 90 UG/1
2 AEROSOL, METERED RESPIRATORY (INHALATION) EVERY 6 HOURS PRN
Qty: 8.5 G | Refills: 0 | Status: SHIPPED | OUTPATIENT
Start: 2024-03-05

## 2024-04-02 DIAGNOSIS — E29.1 HYPOGONADISM IN MALE: ICD-10-CM

## 2024-04-03 PROCEDURE — RXMED WILLOW AMBULATORY MEDICATION CHARGE: Performed by: FAMILY MEDICINE

## 2024-04-03 RX ORDER — ANASTROZOLE 1 MG/1
TABLET ORAL
Qty: 10 TABLET | Refills: 0 | Status: SHIPPED | OUTPATIENT
Start: 2024-04-03

## 2024-04-03 RX ORDER — TESTOSTERONE CYPIONATE 200 MG/ML
200 INJECTION, SOLUTION INTRAMUSCULAR
Qty: 10 ML | Refills: 1 | Status: SHIPPED | OUTPATIENT
Start: 2024-04-03 | End: 2024-08-31

## 2024-04-03 RX ORDER — NEEDLES, DISPOSABLE 25GX5/8"
NEEDLE, DISPOSABLE MISCELLANEOUS
Qty: 10 EACH | Refills: 2 | Status: SHIPPED | OUTPATIENT
Start: 2024-04-03

## 2024-04-04 ENCOUNTER — PHARMACY VISIT (OUTPATIENT)
Dept: PHARMACY | Facility: MEDICAL CENTER | Age: 39
End: 2024-04-04
Payer: COMMERCIAL

## 2024-04-10 ENCOUNTER — PHARMACY VISIT (OUTPATIENT)
Dept: PHARMACY | Facility: MEDICAL CENTER | Age: 39
End: 2024-04-10
Payer: COMMERCIAL

## 2024-04-10 DIAGNOSIS — J30.89 NON-SEASONAL ALLERGIC RHINITIS, UNSPECIFIED TRIGGER: ICD-10-CM

## 2024-04-10 PROCEDURE — RXMED WILLOW AMBULATORY MEDICATION CHARGE: Performed by: FAMILY MEDICINE

## 2024-04-10 PROCEDURE — RXOTC WILLOW AMBULATORY OTC CHARGE: Performed by: PHARMACIST

## 2024-04-10 RX ORDER — DOXYCYCLINE HYCLATE 100 MG
TABLET ORAL
Qty: 20 TABLET | Refills: 0 | Status: SHIPPED | OUTPATIENT
Start: 2024-04-10

## 2024-04-10 RX ORDER — PSEUDOEPHEDRINE HCL 30 MG
60 TABLET ORAL 2 TIMES DAILY PRN
Qty: 45 TABLET | Refills: 0 | Status: SHIPPED | OUTPATIENT
Start: 2024-04-10 | End: 2024-04-12 | Stop reason: SDUPTHER

## 2024-04-11 ENCOUNTER — PHARMACY VISIT (OUTPATIENT)
Dept: PHARMACY | Facility: MEDICAL CENTER | Age: 39
End: 2024-04-11
Payer: COMMERCIAL

## 2024-04-11 DIAGNOSIS — H10.023 PINK EYE DISEASE OF BOTH EYES: ICD-10-CM

## 2024-04-11 PROCEDURE — RXMED WILLOW AMBULATORY MEDICATION CHARGE: Performed by: FAMILY MEDICINE

## 2024-04-11 RX ORDER — POLYMYXIN B SULFATE AND TRIMETHOPRIM 1; 10000 MG/ML; [USP'U]/ML
1 SOLUTION OPHTHALMIC EVERY 4 HOURS
Qty: 10 ML | Refills: 0 | Status: SHIPPED | OUTPATIENT
Start: 2024-04-11

## 2024-04-12 ENCOUNTER — PHARMACY VISIT (OUTPATIENT)
Dept: PHARMACY | Facility: MEDICAL CENTER | Age: 39
End: 2024-04-12
Payer: COMMERCIAL

## 2024-04-12 ENCOUNTER — NON-PROVIDER VISIT (OUTPATIENT)
Dept: MEDICAL GROUP | Age: 39
End: 2024-04-12
Payer: COMMERCIAL

## 2024-04-12 DIAGNOSIS — J30.89 NON-SEASONAL ALLERGIC RHINITIS, UNSPECIFIED TRIGGER: ICD-10-CM

## 2024-04-12 PROCEDURE — RXMED WILLOW AMBULATORY MEDICATION CHARGE: Performed by: FAMILY MEDICINE

## 2024-04-12 PROCEDURE — 99999 PR NO CHARGE: CPT | Performed by: FAMILY MEDICINE

## 2024-04-12 RX ORDER — COLISTIN SULFATE, NEOMYCIN SULFATE, THONZONIUM BROMIDE AND HYDROCORTISONE ACETATE 3; 3.3; .5; 1 MG/ML; MG/ML; MG/ML; MG/ML
5 SUSPENSION AURICULAR (OTIC) 3 TIMES DAILY
Qty: 10 ML | Refills: 0 | Status: SHIPPED | OUTPATIENT
Start: 2024-04-12

## 2024-04-12 RX ORDER — PSEUDOEPHEDRINE HCL 30 MG
60 TABLET ORAL 2 TIMES DAILY PRN
Qty: 45 TABLET | Refills: 0 | Status: SHIPPED | OUTPATIENT
Start: 2024-04-12

## 2024-04-12 RX ORDER — PREDNISONE 20 MG/1
TABLET ORAL
Qty: 12 TABLET | Refills: 0 | Status: SHIPPED | OUTPATIENT
Start: 2024-04-12

## 2024-04-12 NOTE — PROGRESS NOTES
Bassem Wilson is a 38 y.o. male here for a non-provider visit for Ear Lavage    If abnormal was an in office provider notified today (if so, indicate provider)? Yes    Routed to PCP? Yes

## 2024-04-24 ENCOUNTER — PHARMACY VISIT (OUTPATIENT)
Dept: PHARMACY | Facility: MEDICAL CENTER | Age: 39
End: 2024-04-24
Payer: COMMERCIAL

## 2024-04-24 PROCEDURE — RXMED WILLOW AMBULATORY MEDICATION CHARGE: Performed by: FAMILY MEDICINE

## 2024-04-26 ENCOUNTER — APPOINTMENT (OUTPATIENT)
Dept: RADIOLOGY | Facility: MEDICAL CENTER | Age: 39
End: 2024-04-26
Attending: FAMILY MEDICINE
Payer: COMMERCIAL

## 2024-04-26 ENCOUNTER — PATIENT MESSAGE (OUTPATIENT)
Dept: MEDICAL GROUP | Age: 39
End: 2024-04-26
Payer: COMMERCIAL

## 2024-04-26 DIAGNOSIS — M54.16 LUMBAR RADICULITIS: ICD-10-CM

## 2024-04-26 PROCEDURE — RXMED WILLOW AMBULATORY MEDICATION CHARGE: Performed by: FAMILY MEDICINE

## 2024-04-26 PROCEDURE — 72148 MRI LUMBAR SPINE W/O DYE: CPT

## 2024-04-26 RX ORDER — LORAZEPAM 2 MG/1
TABLET ORAL
Qty: 1 TABLET | Refills: 0 | Status: SHIPPED | OUTPATIENT
Start: 2024-04-26 | End: 2024-06-19

## 2024-04-26 NOTE — PROGRESS NOTES
Last weekend, the patient was bending over to  a ball, and doing so I felt a pop in my back with severe burning pain radiating from my mid right low back down the right lateral thigh and the right anterolateral thigh with severe 10 out of 10 pain. This is not improved. This is causing numbness in the same region of my right thigh. I’m also having some weakness in my thigh. This is constant. The pain is getting worse. I can only sit for about 30 seconds. This is causing difficulty with function and ADLs. I do not have bladder changes. I do not have bowel changes. I do not have Saddle anesthesia. I do not have any fevers. I have attempted my physical therapy exercises for my back however, these have caused exacerbation of pain. I am unable to tolerate physical therapy or exercise.     Could you please order an MRI lumbar spine without contrast stat. Thank you.         On a separate note, could you please sign a letter stating that I have been unable to ski this year because of a right knee effusion and right knee arthritis. This would help me to get a refund on my ski pass.     Also, could I have a letter recommending massage for my back pain and if you could sign that letter. That would help me to use massage with [employer] dollars.

## 2024-04-27 RX ORDER — GABAPENTIN 300 MG/1
300 CAPSULE ORAL 3 TIMES DAILY
Qty: 180 CAPSULE | Refills: 1 | Status: CANCELLED | OUTPATIENT
Start: 2024-04-27 | End: 2025-01-22

## 2024-04-27 RX ORDER — MELOXICAM 15 MG/1
15 TABLET ORAL DAILY
Qty: 30 TABLET | Refills: 2 | Status: CANCELLED
Start: 2024-04-27

## 2024-04-29 ENCOUNTER — TELEPHONE (OUTPATIENT)
Dept: MEDICAL GROUP | Age: 39
End: 2024-04-29
Payer: COMMERCIAL

## 2024-04-29 NOTE — TELEPHONE ENCOUNTER
Patient message my chart I'm not sure if have looked at this :  Hi Dr Tuttle     Last weekend, I was bending over to  a ball, and doing so I felt a pop in my back with severe burning pain radiating from my mid right low back down the right lateral thigh and the right anterolateral thigh with severe 10 out of 10 pain. This is not improved. This is causing numbness in the same region of my right thigh. I’m also having some weakness in my thigh. This is constant. The pain is getting worse. I can only sit for about 30 seconds. This is causing difficulty with function and ADLs. I do not have bladder changes. I do not have bowel changes. I do not have Saddle anesthesia. I do not have any fevers. I have attempted my physical therapy exercises for my back however, these have caused exacerbation of pain. I am unable to tolerate physical therapy or exercise.     Could you please order an MRI lumbar spine without contrast stat. Thank you.         On a separate note, could you please sign a letter stating that I have been unable to ski this year because of a right knee effusion and right knee arthritis. This would help me to get a refund on my ski pass.     Also, could I have a letter recommending massage for my back pain and if you could sign that letter. That would help me to use massage with [employer] dollars.     Thank you Dr Tuttle

## 2024-04-30 ENCOUNTER — PATIENT MESSAGE (OUTPATIENT)
Dept: RADIOLOGY | Facility: IMAGING CENTER | Age: 39
End: 2024-04-30
Payer: COMMERCIAL

## 2024-04-30 ENCOUNTER — PHARMACY VISIT (OUTPATIENT)
Dept: PHARMACY | Facility: MEDICAL CENTER | Age: 39
End: 2024-04-30
Payer: COMMERCIAL

## 2024-04-30 DIAGNOSIS — M54.16 LUMBAR RADICULITIS: ICD-10-CM

## 2024-04-30 DIAGNOSIS — D49.7 SPINAL CORD TUMOR: ICD-10-CM

## 2024-04-30 PROCEDURE — RXOTC WILLOW AMBULATORY OTC CHARGE: Performed by: PHARMACIST

## 2024-04-30 PROCEDURE — RXMED WILLOW AMBULATORY MEDICATION CHARGE: Performed by: FAMILY MEDICINE

## 2024-04-30 RX ORDER — MELOXICAM 15 MG/1
15 TABLET ORAL DAILY
Qty: 90 TABLET | Refills: 2 | Status: SHIPPED | OUTPATIENT
Start: 2024-04-30

## 2024-04-30 RX ORDER — GABAPENTIN 300 MG/1
300 CAPSULE ORAL 3 TIMES DAILY
Qty: 90 CAPSULE | Refills: 2 | Status: SHIPPED | OUTPATIENT
Start: 2024-04-30

## 2024-04-30 NOTE — RESULT ENCOUNTER NOTE
I discussed results and plan with patient, who stated understanding.       Jeanmarie Tuttle MD  [employer], [address] [address]

## 2024-05-01 ENCOUNTER — HOSPITAL ENCOUNTER (OUTPATIENT)
Dept: RADIOLOGY | Facility: MEDICAL CENTER | Age: 39
End: 2024-05-01
Attending: FAMILY MEDICINE
Payer: COMMERCIAL

## 2024-05-01 DIAGNOSIS — D49.7 SPINAL CORD TUMOR: ICD-10-CM

## 2024-05-01 RX ADMIN — GADOTERIDOL 20 ML: 279.3 INJECTION, SOLUTION INTRAVENOUS at 14:15

## 2024-05-08 DIAGNOSIS — J30.89 NON-SEASONAL ALLERGIC RHINITIS, UNSPECIFIED TRIGGER: ICD-10-CM

## 2024-05-09 RX ORDER — PREDNISONE 20 MG/1
TABLET ORAL
Qty: 12 TABLET | Refills: 0 | Status: SHIPPED | OUTPATIENT
Start: 2024-05-09

## 2024-05-10 PROCEDURE — RXMED WILLOW AMBULATORY MEDICATION CHARGE: Performed by: FAMILY MEDICINE

## 2024-05-15 ENCOUNTER — PHARMACY VISIT (OUTPATIENT)
Dept: PHARMACY | Facility: MEDICAL CENTER | Age: 39
End: 2024-05-15
Payer: COMMERCIAL

## 2024-05-15 RX ORDER — ANASTROZOLE 1 MG/1
TABLET ORAL
Qty: 10 TABLET | Refills: 0 | Status: SHIPPED | OUTPATIENT
Start: 2024-05-15

## 2024-05-16 PROCEDURE — RXMED WILLOW AMBULATORY MEDICATION CHARGE: Performed by: ALLERGY & IMMUNOLOGY

## 2024-05-20 ENCOUNTER — PHARMACY VISIT (OUTPATIENT)
Dept: PHARMACY | Facility: MEDICAL CENTER | Age: 39
End: 2024-05-20
Payer: COMMERCIAL

## 2024-05-20 PROCEDURE — RXMED WILLOW AMBULATORY MEDICATION CHARGE: Performed by: ALLERGY & IMMUNOLOGY

## 2024-05-20 RX ORDER — AZELASTINE HYDROCHLORIDE, FLUTICASONE PROPIONATE 137; 50 UG/1; UG/1
SPRAY, METERED NASAL
Qty: 23 G | Refills: 4 | OUTPATIENT
Start: 2024-05-20

## 2024-05-20 RX ORDER — ALBUTEROL SULFATE 90 UG/1
AEROSOL, METERED RESPIRATORY (INHALATION)
Qty: 8.5 G | Refills: 3 | OUTPATIENT
Start: 2024-05-20

## 2024-07-09 PROCEDURE — RXMED WILLOW AMBULATORY MEDICATION CHARGE: Performed by: FAMILY MEDICINE

## 2024-07-09 PROCEDURE — RXMED WILLOW AMBULATORY MEDICATION CHARGE: Performed by: ALLERGY & IMMUNOLOGY

## 2024-07-22 PROCEDURE — RXMED WILLOW AMBULATORY MEDICATION CHARGE: Performed by: FAMILY MEDICINE

## 2024-07-23 ENCOUNTER — PHARMACY VISIT (OUTPATIENT)
Dept: PHARMACY | Facility: MEDICAL CENTER | Age: 39
End: 2024-07-23
Payer: COMMERCIAL

## 2024-08-05 PROCEDURE — RXMED WILLOW AMBULATORY MEDICATION CHARGE: Performed by: FAMILY MEDICINE

## 2024-08-06 ENCOUNTER — PHARMACY VISIT (OUTPATIENT)
Dept: PHARMACY | Facility: MEDICAL CENTER | Age: 39
End: 2024-08-06
Payer: COMMERCIAL

## 2024-08-20 ENCOUNTER — PATIENT MESSAGE (OUTPATIENT)
Dept: MEDICAL GROUP | Age: 39
End: 2024-08-20
Payer: COMMERCIAL

## 2024-08-20 DIAGNOSIS — G89.29 CHRONIC PAIN OF RIGHT KNEE: ICD-10-CM

## 2024-08-20 DIAGNOSIS — M25.561 CHRONIC PAIN OF RIGHT KNEE: ICD-10-CM

## 2024-08-25 ENCOUNTER — HOSPITAL ENCOUNTER (OUTPATIENT)
Dept: RADIOLOGY | Facility: MEDICAL CENTER | Age: 39
End: 2024-08-25
Attending: FAMILY MEDICINE
Payer: COMMERCIAL

## 2024-08-25 DIAGNOSIS — G89.29 CHRONIC PAIN OF RIGHT KNEE: ICD-10-CM

## 2024-08-25 DIAGNOSIS — M25.561 CHRONIC PAIN OF RIGHT KNEE: ICD-10-CM

## 2024-08-25 PROCEDURE — 73721 MRI JNT OF LWR EXTRE W/O DYE: CPT | Mod: RT

## 2024-09-25 ENCOUNTER — IMMUNIZATION (OUTPATIENT)
Dept: OCCUPATIONAL MEDICINE | Facility: CLINIC | Age: 39
End: 2024-09-25

## 2024-09-25 DIAGNOSIS — Z23 NEED FOR VACCINATION: Primary | ICD-10-CM

## 2024-10-01 RX ORDER — ANASTROZOLE 1 MG/1
0.5 TABLET ORAL
Qty: 10 TABLET | Refills: 0 | Status: SHIPPED | OUTPATIENT
Start: 2024-10-01

## 2024-10-08 PROCEDURE — RXMED WILLOW AMBULATORY MEDICATION CHARGE: Performed by: FAMILY MEDICINE

## 2024-10-08 PROCEDURE — RXMED WILLOW AMBULATORY MEDICATION CHARGE: Performed by: ALLERGY & IMMUNOLOGY

## 2024-10-09 DIAGNOSIS — E29.1 HYPOGONADISM IN MALE: ICD-10-CM

## 2024-10-10 ENCOUNTER — PHARMACY VISIT (OUTPATIENT)
Dept: PHARMACY | Facility: MEDICAL CENTER | Age: 39
End: 2024-10-10
Payer: COMMERCIAL

## 2024-10-10 PROCEDURE — RXOTC WILLOW AMBULATORY OTC CHARGE: Performed by: PHARMACIST

## 2024-10-10 PROCEDURE — RXMED WILLOW AMBULATORY MEDICATION CHARGE: Performed by: FAMILY MEDICINE

## 2024-10-10 RX ORDER — TESTOSTERONE CYPIONATE 200 MG/ML
200 INJECTION, SOLUTION INTRAMUSCULAR
Qty: 10 ML | Refills: 1 | Status: SHIPPED | OUTPATIENT
Start: 2024-10-10 | End: 2025-03-09

## 2024-12-03 PROCEDURE — RXMED WILLOW AMBULATORY MEDICATION CHARGE: Performed by: FAMILY MEDICINE

## 2024-12-03 RX ORDER — ANASTROZOLE 1 MG/1
0.5 TABLET ORAL
Qty: 10 TABLET | Refills: 3 | Status: SHIPPED | OUTPATIENT
Start: 2024-12-03

## 2024-12-10 ENCOUNTER — PHARMACY VISIT (OUTPATIENT)
Dept: PHARMACY | Facility: MEDICAL CENTER | Age: 39
End: 2024-12-10
Payer: COMMERCIAL

## 2024-12-11 DIAGNOSIS — E55.9 VITAMIN D DEFICIENCY: ICD-10-CM

## 2024-12-11 DIAGNOSIS — E29.1 HYPOGONADISM IN MALE: ICD-10-CM

## 2024-12-11 DIAGNOSIS — E66.9 OBESITY (BMI 30-39.9): ICD-10-CM

## 2024-12-11 DIAGNOSIS — Z00.00 ANNUAL PHYSICAL EXAM: ICD-10-CM

## 2024-12-11 DIAGNOSIS — N40.0 BENIGN PROSTATIC HYPERPLASIA, UNSPECIFIED WHETHER LOWER URINARY TRACT SYMPTOMS PRESENT: ICD-10-CM

## 2024-12-11 RX ORDER — SEMAGLUTIDE 1.34 MG/ML
INJECTION, SOLUTION SUBCUTANEOUS
Qty: 0.5 ML | Refills: 0 | Status: SHIPPED | OUTPATIENT
Start: 2024-12-11

## 2024-12-11 RX ORDER — SEMAGLUTIDE 1.34 MG/ML
INJECTION, SOLUTION SUBCUTANEOUS
Qty: 3 ML | Refills: 0 | Status: SHIPPED | OUTPATIENT
Start: 2024-12-11

## 2024-12-11 RX ORDER — SEMAGLUTIDE 0.68 MG/ML
0.5 INJECTION, SOLUTION SUBCUTANEOUS
Qty: 3 ML | Refills: 0 | Status: SHIPPED | OUTPATIENT
Start: 2024-12-11

## 2024-12-16 ENCOUNTER — HOSPITAL ENCOUNTER (OUTPATIENT)
Dept: LAB | Facility: MEDICAL CENTER | Age: 39
End: 2024-12-16
Attending: FAMILY MEDICINE
Payer: COMMERCIAL

## 2024-12-16 DIAGNOSIS — Z00.00 ANNUAL PHYSICAL EXAM: ICD-10-CM

## 2024-12-16 DIAGNOSIS — N40.0 BENIGN PROSTATIC HYPERPLASIA, UNSPECIFIED WHETHER LOWER URINARY TRACT SYMPTOMS PRESENT: ICD-10-CM

## 2024-12-16 DIAGNOSIS — E55.9 VITAMIN D DEFICIENCY: ICD-10-CM

## 2024-12-16 DIAGNOSIS — E29.1 HYPOGONADISM IN MALE: ICD-10-CM

## 2024-12-16 LAB
25(OH)D3 SERPL-MCNC: 49 NG/ML (ref 30–100)
ALBUMIN SERPL BCP-MCNC: 4.4 G/DL (ref 3.2–4.9)
ALBUMIN/GLOB SERPL: 1.7 G/DL
ALP SERPL-CCNC: 64 U/L (ref 30–99)
ALT SERPL-CCNC: 40 U/L (ref 2–50)
ANION GAP SERPL CALC-SCNC: 10 MMOL/L (ref 7–16)
AST SERPL-CCNC: 33 U/L (ref 12–45)
BASOPHILS # BLD AUTO: 0.4 % (ref 0–1.8)
BASOPHILS # BLD: 0.02 K/UL (ref 0–0.12)
BILIRUB SERPL-MCNC: 0.7 MG/DL (ref 0.1–1.5)
BUN SERPL-MCNC: 15 MG/DL (ref 8–22)
CALCIUM ALBUM COR SERPL-MCNC: 9 MG/DL (ref 8.5–10.5)
CALCIUM SERPL-MCNC: 9.3 MG/DL (ref 8.5–10.5)
CHLORIDE SERPL-SCNC: 103 MMOL/L (ref 96–112)
CHOLEST SERPL-MCNC: 150 MG/DL (ref 100–199)
CO2 SERPL-SCNC: 26 MMOL/L (ref 20–33)
CREAT SERPL-MCNC: 1.03 MG/DL (ref 0.5–1.4)
EOSINOPHIL # BLD AUTO: 0.11 K/UL (ref 0–0.51)
EOSINOPHIL NFR BLD: 2.3 % (ref 0–6.9)
ERYTHROCYTE [DISTWIDTH] IN BLOOD BY AUTOMATED COUNT: 37.5 FL (ref 35.9–50)
EST. AVERAGE GLUCOSE BLD GHB EST-MCNC: 103 MG/DL
GFR SERPLBLD CREATININE-BSD FMLA CKD-EPI: 93 ML/MIN/1.73 M 2
GLOBULIN SER CALC-MCNC: 2.6 G/DL (ref 1.9–3.5)
GLUCOSE SERPL-MCNC: 109 MG/DL (ref 65–99)
HBA1C MFR BLD: 5.2 % (ref 4–5.6)
HCT VFR BLD AUTO: 45.7 % (ref 42–52)
HDLC SERPL-MCNC: 73 MG/DL
HGB BLD-MCNC: 15.3 G/DL (ref 14–18)
IMM GRANULOCYTES # BLD AUTO: 0.02 K/UL (ref 0–0.11)
IMM GRANULOCYTES NFR BLD AUTO: 0.4 % (ref 0–0.9)
LDLC SERPL CALC-MCNC: 56 MG/DL
LYMPHOCYTES # BLD AUTO: 1.44 K/UL (ref 1–4.8)
LYMPHOCYTES NFR BLD: 30.3 % (ref 22–41)
MCH RBC QN AUTO: 28.1 PG (ref 27–33)
MCHC RBC AUTO-ENTMCNC: 33.5 G/DL (ref 32.3–36.5)
MCV RBC AUTO: 84 FL (ref 81.4–97.8)
MONOCYTES # BLD AUTO: 0.6 K/UL (ref 0–0.85)
MONOCYTES NFR BLD AUTO: 12.6 % (ref 0–13.4)
NEUTROPHILS # BLD AUTO: 2.57 K/UL (ref 1.82–7.42)
NEUTROPHILS NFR BLD: 54 % (ref 44–72)
NRBC # BLD AUTO: 0 K/UL
NRBC BLD-RTO: 0 /100 WBC (ref 0–0.2)
PLATELET # BLD AUTO: 225 K/UL (ref 164–446)
PMV BLD AUTO: 9.9 FL (ref 9–12.9)
POTASSIUM SERPL-SCNC: 4.1 MMOL/L (ref 3.6–5.5)
PROT SERPL-MCNC: 7 G/DL (ref 6–8.2)
PSA SERPL DL<=0.01 NG/ML-MCNC: 0.67 NG/ML (ref 0–4)
RBC # BLD AUTO: 5.44 M/UL (ref 4.7–6.1)
SODIUM SERPL-SCNC: 139 MMOL/L (ref 135–145)
T3FREE SERPL-MCNC: 4.2 PG/ML (ref 2–4.4)
T4 FREE SERPL-MCNC: 1.68 NG/DL (ref 0.93–1.7)
TRIGL SERPL-MCNC: 107 MG/DL (ref 0–149)
TSH SERPL-ACNC: 0.96 UIU/ML (ref 0.35–5.5)
WBC # BLD AUTO: 4.8 K/UL (ref 4.8–10.8)

## 2024-12-16 PROCEDURE — 84481 FREE ASSAY (FT-3): CPT

## 2024-12-16 PROCEDURE — 84443 ASSAY THYROID STIM HORMONE: CPT

## 2024-12-16 PROCEDURE — 80053 COMPREHEN METABOLIC PANEL: CPT

## 2024-12-16 PROCEDURE — 83036 HEMOGLOBIN GLYCOSYLATED A1C: CPT

## 2024-12-16 PROCEDURE — 84153 ASSAY OF PSA TOTAL: CPT

## 2024-12-16 PROCEDURE — 84402 ASSAY OF FREE TESTOSTERONE: CPT

## 2024-12-16 PROCEDURE — 84403 ASSAY OF TOTAL TESTOSTERONE: CPT

## 2024-12-16 PROCEDURE — 84270 ASSAY OF SEX HORMONE GLOBUL: CPT

## 2024-12-16 PROCEDURE — 80061 LIPID PANEL: CPT

## 2024-12-16 PROCEDURE — 36415 COLL VENOUS BLD VENIPUNCTURE: CPT

## 2024-12-16 PROCEDURE — 82306 VITAMIN D 25 HYDROXY: CPT

## 2024-12-16 PROCEDURE — 84439 ASSAY OF FREE THYROXINE: CPT

## 2024-12-16 PROCEDURE — 85025 COMPLETE CBC W/AUTO DIFF WBC: CPT

## 2024-12-17 LAB
SHBG SERPL-SCNC: 37 NMOL/L (ref 17–56)
TESTOST FREE MFR SERPL: 1.7 % (ref 1.6–2.9)
TESTOST FREE SERPL-MCNC: 39 PG/ML (ref 47–244)
TESTOST SERPL-MCNC: 237 NG/DL (ref 300–890)

## 2024-12-25 DIAGNOSIS — E29.1 HYPOGONADISM IN MALE: ICD-10-CM

## 2024-12-25 PROCEDURE — RXMED WILLOW AMBULATORY MEDICATION CHARGE: Performed by: FAMILY MEDICINE

## 2024-12-26 NOTE — TELEPHONE ENCOUNTER
Received request via: Pharmacy    Was the patient seen in the last year in this department? No    Does the patient have an active prescription (recently filled or refills available) for medication(s) requested? No    Ph[facility] Name[facility]     Does the patient have shelter Plus and need 100-day supply? (This applies to ALL medications) Patient does not have SCP      Patient has not been sen since 11/2022

## 2024-12-30 PROCEDURE — RXMED WILLOW AMBULATORY MEDICATION CHARGE: Performed by: FAMILY MEDICINE

## 2024-12-31 ENCOUNTER — PHARMACY VISIT (OUTPATIENT)
Dept: PHARMACY | Facility: MEDICAL CENTER | Age: 39
End: 2024-12-31
Payer: COMMERCIAL

## 2025-02-02 ENCOUNTER — HOSPITAL ENCOUNTER (OUTPATIENT)
Dept: RADIOLOGY | Facility: MEDICAL CENTER | Age: 40
End: 2025-02-02
Attending: FAMILY MEDICINE
Payer: COMMERCIAL

## 2025-02-02 DIAGNOSIS — M25.561 CHRONIC PAIN OF RIGHT KNEE: ICD-10-CM

## 2025-02-02 DIAGNOSIS — G89.29 CHRONIC PAIN OF RIGHT KNEE: ICD-10-CM

## 2025-02-02 PROCEDURE — 73564 X-RAY EXAM KNEE 4 OR MORE: CPT | Mod: RT

## 2025-03-06 PROCEDURE — RXMED WILLOW AMBULATORY MEDICATION CHARGE: Performed by: ALLERGY & IMMUNOLOGY

## 2025-03-06 PROCEDURE — RXMED WILLOW AMBULATORY MEDICATION CHARGE: Performed by: FAMILY MEDICINE

## 2025-03-10 ENCOUNTER — PHARMACY VISIT (OUTPATIENT)
Dept: PHARMACY | Facility: MEDICAL CENTER | Age: 40
End: 2025-03-10
Payer: COMMERCIAL

## 2025-03-25 ENCOUNTER — PHARMACY VISIT (OUTPATIENT)
Dept: PHARMACY | Facility: MEDICAL CENTER | Age: 40
End: 2025-03-25
Payer: COMMERCIAL

## 2025-03-25 PROCEDURE — RXMED WILLOW AMBULATORY MEDICATION CHARGE: Performed by: FAMILY MEDICINE

## 2025-04-04 DIAGNOSIS — E78.00 PURE HYPERCHOLESTEROLEMIA: ICD-10-CM

## 2025-04-07 NOTE — TELEPHONE ENCOUNTER
Received request via: Pharmacy    Was the patient seen in the last year in this department? No    Does the patient have an active prescription (recently filled or refills available) for medication(s) requested? No    Pharmacy Name: [facility]    Does the patient have alf Plus and need 100-day supply? (This applies to ALL medications) Patient does not have SCP

## 2025-04-08 RX ORDER — ROSUVASTATIN CALCIUM 20 MG/1
20 TABLET, COATED ORAL EVERY EVENING
Qty: 90 TABLET | Refills: 2 | Status: SHIPPED | OUTPATIENT
Start: 2025-04-08

## 2025-05-05 PROCEDURE — RXMED WILLOW AMBULATORY MEDICATION CHARGE: Performed by: FAMILY MEDICINE

## 2025-05-08 ENCOUNTER — PHARMACY VISIT (OUTPATIENT)
Dept: PHARMACY | Facility: MEDICAL CENTER | Age: 40
End: 2025-05-08
Payer: COMMERCIAL

## 2025-05-08 PROCEDURE — RXMED WILLOW AMBULATORY MEDICATION CHARGE: Performed by: FAMILY MEDICINE

## 2025-06-06 DIAGNOSIS — E29.1 HYPOGONADISM IN MALE: ICD-10-CM

## 2025-06-06 RX ORDER — TESTOSTERONE CYPIONATE 200 MG/ML
200 INJECTION, SOLUTION INTRAMUSCULAR
Qty: 10 ML | Refills: 1 | Status: SHIPPED | OUTPATIENT
Start: 2025-06-06 | End: 2025-11-03

## 2025-06-06 NOTE — TELEPHONE ENCOUNTER
Received request via: Pharmacy    Was the patient seen in the last year in this department? No PATIENT HAS NOT BEEN SEEN SINCE 2022 NEEDS TO RE-ESTABLISH CARE     Does the patient have an active prescription (recently filled or refills available) for medication(s) requested? No    Pharmacy Name: [facility]     Does the patient have skilled nursing Plus and need 100-day supply? (This applies to ALL medications) Patient does not have SCP

## 2025-06-23 PROCEDURE — RXMED WILLOW AMBULATORY MEDICATION CHARGE: Performed by: FAMILY MEDICINE

## 2025-06-24 ENCOUNTER — PHARMACY VISIT (OUTPATIENT)
Dept: PHARMACY | Facility: MEDICAL CENTER | Age: 40
End: 2025-06-24
Payer: COMMERCIAL

## 2025-06-24 PROCEDURE — RXOTC WILLOW AMBULATORY OTC CHARGE

## 2025-07-23 ENCOUNTER — PATIENT MESSAGE (OUTPATIENT)
Dept: MEDICAL GROUP | Age: 40
End: 2025-07-23
Payer: COMMERCIAL

## 2025-07-23 DIAGNOSIS — G89.29 CHRONIC RIGHT SHOULDER PAIN: Primary | ICD-10-CM

## 2025-07-23 DIAGNOSIS — M25.511 CHRONIC RIGHT SHOULDER PAIN: Primary | ICD-10-CM

## 2025-07-23 PROCEDURE — RXMED WILLOW AMBULATORY MEDICATION CHARGE: Performed by: FAMILY MEDICINE

## 2025-07-23 RX ORDER — ALPRAZOLAM 0.5 MG
TABLET ORAL
Qty: 2 TABLET | Refills: 0 | Status: SHIPPED | OUTPATIENT
Start: 2025-07-23 | End: 2025-08-20

## 2025-07-25 ENCOUNTER — HOSPITAL ENCOUNTER (OUTPATIENT)
Dept: RADIOLOGY | Facility: MEDICAL CENTER | Age: 40
End: 2025-07-25
Attending: FAMILY MEDICINE
Payer: COMMERCIAL

## 2025-07-25 ENCOUNTER — PHARMACY VISIT (OUTPATIENT)
Dept: PHARMACY | Facility: MEDICAL CENTER | Age: 40
End: 2025-07-25
Payer: COMMERCIAL

## 2025-07-25 DIAGNOSIS — G89.29 CHRONIC RIGHT SHOULDER PAIN: ICD-10-CM

## 2025-07-25 DIAGNOSIS — M25.511 CHRONIC RIGHT SHOULDER PAIN: ICD-10-CM

## 2025-07-25 PROCEDURE — 73221 MRI JOINT UPR EXTREM W/O DYE: CPT | Mod: RT

## 2025-07-28 ENCOUNTER — OFFICE VISIT (OUTPATIENT)
Dept: PHYSICAL MEDICINE AND REHAB | Facility: MEDICAL CENTER | Age: 40
End: 2025-07-28
Payer: COMMERCIAL

## 2025-07-28 DIAGNOSIS — M75.41 SUBACROMIAL IMPINGEMENT OF RIGHT SHOULDER: ICD-10-CM

## 2025-07-28 DIAGNOSIS — M19.011 ARTHRITIS OF RIGHT ACROMIOCLAVICULAR JOINT: Primary | ICD-10-CM

## 2025-07-28 RX ORDER — DEXAMETHASONE SODIUM PHOSPHATE 4 MG/ML
4 INJECTION, SOLUTION INTRA-ARTICULAR; INTRALESIONAL; INTRAMUSCULAR; INTRAVENOUS; SOFT TISSUE ONCE
Status: DISCONTINUED | OUTPATIENT
Start: 2025-07-28 | End: 2025-07-28

## 2025-07-28 RX ORDER — DEXAMETHASONE SODIUM PHOSPHATE 10 MG/ML
10 INJECTION, SOLUTION INTRA-ARTICULAR; INTRALESIONAL; INTRAMUSCULAR; INTRAVENOUS; SOFT TISSUE ONCE
Status: DISCONTINUED | OUTPATIENT
Start: 2025-07-28 | End: 2025-07-28

## 2025-07-28 RX ORDER — DEXAMETHASONE SODIUM PHOSPHATE 4 MG/ML
4 INJECTION, SOLUTION INTRA-ARTICULAR; INTRALESIONAL; INTRAMUSCULAR; INTRAVENOUS; SOFT TISSUE ONCE
Status: COMPLETED | OUTPATIENT
Start: 2025-07-28 | End: 2025-07-28

## 2025-07-28 RX ADMIN — DEXAMETHASONE SODIUM PHOSPHATE 4 MG: 4 INJECTION, SOLUTION INTRA-ARTICULAR; INTRALESIONAL; INTRAMUSCULAR; INTRAVENOUS; SOFT TISSUE at 13:23

## 2025-07-28 RX ADMIN — Medication 6 ML: at 10:07

## 2025-07-28 NOTE — Clinical Note
REFERRAL APPROVAL NOTICE         Sent on July 28, 2025                   Bassem Wilson  [address] [facility] [address]                   Dear Mr. Wilson,    After a careful review of the medical information and benefit coverage, [facility] has processed your referral. See below for additional details.    If applicable, you must be actively enrolled with your insurance for coverage of the authorized service. If you have any questions regarding your coverage, please contact your insurance directly.    REFERRAL INFORMATION   Referral #:  [id number]  Referred-To Department    Referred-By Provider:  Physical Medicine and Rehab    [facility] PHYSIATRY   Physiatry [facility] [address]      [id number]  [place] [facility] [address]  [phone] [address] [facility] [address]  [place] [facility] [id number] [phone]    Referral Start Date:  07/28/2025  Referral End Date:   07/28/2026             SCHEDULING  If you do not already have an appointment, please call [phone] to make an appointment.     MORE INFORMATION  If you do not already have a [employer] account, sign up at: [email or web address]  You can access your medical information, make appointments, see lab results, billing information, and more.  If you have questions regarding this referral, please contact  the [facility] Referrals department at:             [phone]. Monday - Friday 8:00AM - 5:00PM.     Sincerely,    [facility]

## 2025-07-28 NOTE — PROCEDURES
Date of Service: 7/28/2025    Physician/s: Keenan Elmore D.O.      Pre-operative Diagnosis: right  arthritis of the acromioclavicular joint, right subacromial impingement    Post-operative Diagnosis: right  arthritis of the acromioclavicular joint, right subacromial impingement    Procedure: right  acromioclavicular joint and subacromial  injection ultrasound-guided    Description of procedure:    The risks, benefits, and alternatives of the procedure were reviewed and discussed with the patient.  Written informed consent was freely obtained. A pre-procedural time-out was conducted by the physician verifying patient’s identity, procedure to be performed, procedure site and side, and allergy verification. Appropriate equipment was determined to be in place for the procedure.     No sedation was used for this procedure.     In the office suite the patient was placed in supine position with the RIGHT shoulder exposed. The ultrasound probe was placed over the  lateral aspect of the head of the humerus, and the rotator cuff and humeral head were identified. The skin was prepped and draped in the usual sterile fashion. A 25g 3.5 inch needle was placed into skin via a advanced under ultrasound guidance, in-plane approach, into the subdeltoid bursa. Following negative aspiration, approx 5mL of 1% lidocaine, 1mL 4 mg/mL of dexamethasone was then injected, and the needle was subsequently removed intact.     The patient's shoulder was wiped with a 4x4 gauze, the area was cleansed with alcohol prep, and a bandaid was applied. There were no complications noted.     Patient remained in supine position with his RIGHT shoulder exposed. The ultrasound probe was placed over the superior aspect of the shoulder to identify the Acromioclavicular joint space. The skin was prepped and draped in the usual sterile fashion. A 27g 1.5 inch needle was placed into skin via a advanced under ultrasound guidance, out of plane approach, into the  capsule. Following negative aspiration, approx 0.5mL of 1% lidocaine, and 1mL of 4mg of dexamethasone was then injected, and the needle was subsequently removed intact. The patient's shoulder was wiped with a 4x4 gauze, the area was cleansed with alcohol prep, and a bandaid was applied. There were no complications noted.     Reported 100% relief of pain and improved ROM at right shoulder after injection    Keenan Elmore DO  Physical Medicine and Rehabilitation  Sports Medicine and Spine  [facility]

## 2025-07-28 NOTE — PROGRESS NOTES
[facility] (Physical Medicine and Rehabilitation)  Sports Medicine& Interventional Spine   New Patient Visit    Patient Name: Bassem Wilson   Patient : 1985  PCP: Jeanmarie Tuttle M.D.  MRN: [id number]     Date of service: 25    Referring provider: No ref. provider found    CHIEF COMPLAINT  No chief complaint on file.        Bassem Wilson is a 40 y.o. very pleasant male  who presents today with The primary encounter diagnosis was Arthritis of right acromioclavicular joint. A diagnosis of Subacromial impingement of right shoulder was also pertinent to this visit.    Verbal consent was obtained for [place] copilot: Yes      HPI:    History of Present Illness  The patient is a 40-year-old male who presents for an initial evaluation of chronic right shoulder pain.    He reports pain with any motion, particularly when moving the arm across the chest, as well as discomfort at night when lying on the right side. There is significant tenderness to palpation over the right AC joint. He does not experience any numbness, tingling, or pain in other joints.          GOALS OF TREATMENT: Symptom Pain relief. improve function.      Psychological testing for pain as depression and pain commonly coexist and need to both be treated.     Opioid Risk Score: No value filed.      Interpretation of Opioid Risk Score   Score 0-3 = Low risk of abuse. Do UDS at least once per year.  Score 4-7 = Moderate risk of abuse. Do UDS 1-4 times per year.  Score 8+ = High risk of abuse. Refer to specialist.    PHQ      2022     7:20 AM   Depression Screen (PHQ-2/PHQ-9)   PHQ-2 Total Score 0       Interpretation of PHQ-9 Total Score   Score Severity   1-4 No Depression   5-9 Mild Depression   10-14 Moderate Depression   15-19 Moderately Severe Depression   20-27 Severe Depression      PMHx:   Past Medical History[1]    PSHx:   Past Surgical History[2]    Family history   Family History   Problem Relation Age of Onset     Diabetes Maternal Aunt         Type 1. Passed away. Also with multiple family members with other autoimmune disease.       Medications: reviewed on epic.   Active Medications[3]     Allergies:   Allergies[4]    Social Hx:   Social History     Socioeconomic History    Marital status:      Spouse name: Not on file    Number of children: Not on file    Years of education: Not on file    Highest education level: Professional school degree (e.g., MD, DDS, DVM, SERGIO)   Occupational History    Not on file   Tobacco Use    Smoking status: Never    Smokeless tobacco: Never   Vaping Use    Vaping status: Never Used   Substance and Sexual Activity    Alcohol use: Yes    Drug use: Never    Sexual activity: Not on file   Other Topics Concern    Not on file   Social History Narrative    Not on file     Social Drivers of Health     Financial Resource Strain: Low Risk  (8/18/2022)    Overall Financial Resource Strain (CARDIA)     Difficulty of Paying Living Expenses: Not hard at all   Food Insecurity: No Food Insecurity (8/18/2022)    Hunger Vital Sign     Worried About Running Out of Food in the Last Year: Never true     Ran Out of Food in the Last Year: Never true   Transportation Needs: No Transportation Needs (8/18/2022)    PRAPARE - Transportation     Lack of Transportation (Medical): No     Lack of Transportation (Non-Medical): No   Physical Activity: Sufficiently Active (8/18/2022)    Exercise Vital Sign     Days of Exercise per Week: 6 days     Minutes of Exercise per Session: 40 min   Stress: No Stress Concern Present (8/18/2022)    [employer] [place] [employer] - Occupational Stress Questionnaire     Feeling of Stress : Not at all   Social Connections: Moderately Integrated (8/18/2022)    Social Connection and Isolation Panel [NHANES]     Frequency of Communication with Friends and Family: More than three times a week     Frequency of Social Gatherings with Friends and Family: More than three times a  week     Attends [employer] Services: Never     Active Member of Clubs or Organizations: Yes     Attends Club or Organization Meetings: More than 4 times per year     Marital Status:    Intimate Partner Violence: Not on file   Housing Stability: Low Risk  (8/18/2022)    Housing Stability Vital Sign     Unable to Pay for Housing in the Last Year: No     Number of Places Lived in the Last Year: 1     Unstable Housing in the Last Year: No         EXAMINATION   Vitals: There were no vitals taken for this visit.  Physical Exam:     Body Habitus: There is no height or weight on file to calculate BMI.  Appearance: Well-groomed, well-nourished, not disheveled  Eyes: No scleral icterus to suggest severe liver disease, no proptosis to suggest severe hyperthyroid  ENT -no obvious auditory deficits, no external lesions, moist mucus membranes   Skin -no rashes or lesions noted. No appreciable skin breakdown on exposed skin areas.    Respiratory-  breathing comfortably on room air, no audible wheezing, full sentences  Cardiovascular- No lower extremity edema noted.   Psychiatric- alert and oriented, calm, comfortable, cooperative     Musculoskeletal and Neuro:    Physical Exam  Right Shoulder:   Inspection - No obvious bony or muscular abnormalities. No evidence of asymmetry  Palpation - + TTP over the AC joint area, No TTP ovr LHB tendon, Coracoid, Subacromial space  + Scarf testing    Neurovascular:    Sensation:  Intact throughout  Radial pulse - 2+  Cap refill: < 1 sec       Positive scarf testing and significant tenderness over the right AC joint.          MEDICAL DECISION MAKING    Medical records review: see under HPI section.     DATA    Labs:   Lab Results   Component Value Date/Time    SODIUM 139 12/16/2024 06:51 AM    POTASSIUM 4.1 12/16/2024 06:51 AM    CHLORIDE 103 12/16/2024 06:51 AM    CO2 26 12/16/2024 06:51 AM    ANION 10.0 12/16/2024 06:51 AM    GLUCOSE 109 (H) 12/16/2024 06:51 AM    BUN 15 12/16/2024  "06:51 AM    CREATININE 1.03 2024 06:51 AM    CALCIUM 9.3 2024 06:51 AM    ASTSGOT 33 2024 06:51 AM    ALTSGPT 40 2024 06:51 AM    TBILIRUBIN 0.7 2024 06:51 AM    ALBUMIN 4.4 2024 06:51 AM    TOTPROTEIN 7.0 2024 06:51 AM    GLOBULIN 2.6 2024 06:51 AM    AGRATIO 1.7 2024 06:51 AM   ]    No results found for: \"PROTHROMBTM\", \"INR\"     Lab Results   Component Value Date/Time    WBC 4.8 2024 06:51 AM    RBC 5.44 2024 06:51 AM    HEMOGLOBIN 15.3 2024 06:51 AM    HEMATOCRIT 45.7 2024 06:51 AM    MCV 84.0 2024 06:51 AM    MCH 28.1 2024 06:51 AM    MCHC 33.5 2024 06:51 AM    MPV 9.9 2024 06:51 AM    NEUTSPOLYS 54.00 2024 06:51 AM    LYMPHOCYTES 30.30 2024 06:51 AM    MONOCYTES 12.60 2024 06:51 AM    EOSINOPHILS 2.30 2024 06:51 AM    BASOPHILS 0.40 2024 06:51 AM        Lab Results   Component Value Date/Time    HBA1C 5.2 2024 06:51 AM        Imaging:   I personally reviewed following images, these are my reads  Results  Imaging  MRI of the right shoulder dated 2025 shows significant increased signal around the AC joint with severe arthritis. There is evidence of a subacromial enthesophyte and supraspinatus tendinosis.        ASSESSMENT AND PLAN:  Bassem Wilson   : 1985   Past Medical History[5]      Diagnoses and all orders for this visit:  1. Arthritis of right acromioclavicular joint  Consent for all Surgical, Special Diagnostic or Therapeutic Procedures      2. Subacromial impingement of right shoulder  Consent for all Surgical, Special Diagnostic or Therapeutic Procedures            Assessment & Plan  1. Chronic right shoulder pain:  - The patient reports pain with any motion, especially when moving the arm across the chest, and pain at night when lying on the right side.  - Examination reveals significant tenderness over the right AC joint and positive scarf " testing. MRI of the right shoulder dated 07/26/2025 shows significant increased signal around the AC joint with severe arthritis, a subacromial enthesophyte, and supraspinatus tendinosis.  - A corticosteroid injection of the right AC joint and a subacromial injection were completed today for symptomatic relief.    PROCEDURE  A corticosteroid injection of the right AC joint as well as a subacromial injection was completed today for symptomatic relief.      Orders Placed This Encounter    dexamethasone (Decadron) injection (check route below) 10 mg    lidocaine (Xylocaine) 1 % injection 6 mL    Consent for all Surgical, Special Diagnostic or Therapeutic Procedures           Patient expressed understanding of the management plan. Patient (and Family Members) was/were encouraged to call if any worries, issues, problems or concerns prior to the next visit     Please note that this dictation was created using voice recognition software. I have made every reasonable attempt to correct obvious errors but there may be errors of grammar and content that I may have overlooked prior to finalization of this note.    Keenan Elmore DO  Physical Medicine and Rehabilitation  Sports Medicine and Spine  [facility]                [1]   Past Medical History:  Diagnosis Date    Asthma     Mild intermitant    Hypogonadism in male    [2]   Past Surgical History:  Procedure Laterality Date    PB SHLDR ARTHROSCOP,SURG,W/ROTAT CUFF REPB Left 3/1/2023    Procedure: LEFT SHOULDER ARTHROSCOPY FOR ROTATOR CUFF REPAIR, DEBRIDEMENT, SUBACROMIAL DECOMPRESSION AND OPEN BICEPS TENODESIS WITH REPAIRS AS INDICATED;  Surgeon: Antwon Galaviz M.D.;  Location: SURGERY [facility];  Service: Orthopedics    OPEN BICEPS TENODESIS Left 3/1/2023    Procedure: TENODESIS, BICEPS, OPEN;  Surgeon: Antwon Galaviz M.D.;  Location: [facility];  Service: Orthopedics    BICEPS TENDON REPAIR      ELBOW EXPLORATION Bilateral     HIP  "ARTHROSCOPY Bilateral     KNEE ARTHROSCOPY W/ ACL HEALING RESPONSE Left     ROTATOR CUFF REPAIR     [3]   No outpatient medications have been marked as taking for the 7/28/25 encounter (Appointment) with Keenan Elmore D.O..     Current Facility-Administered Medications for the 7/28/25 encounter (Appointment) with Keenan Elmore D.O.   Medication Dose Route Frequency Provider Last Rate Last Admin    dexamethasone (Decadron) injection (check route below) 10 mg  10 mg Intramuscular Once         lidocaine (Xylocaine) 1 % injection 6 mL  6 mL Injection Once        [4]   Allergies  Allergen Reactions    Ancef [Cefazolin] Anaphylaxis     \"difficulty breathing\"    Food Unspecified     Lactose intolerance    Amoxicillin Hives     \"hives\"   [5]   Past Medical History:  Diagnosis Date    Asthma     Mild intermitant    Hypogonadism in male      "

## 2025-07-29 RX ORDER — ANASTROZOLE 1 MG/1
0.5 TABLET ORAL
Qty: 10 TABLET | Refills: 3 | Status: SHIPPED | OUTPATIENT
Start: 2025-07-29

## 2025-08-25 ENCOUNTER — PHARMACY VISIT (OUTPATIENT)
Dept: PHARMACY | Facility: MEDICAL CENTER | Age: 40
End: 2025-08-25
Payer: COMMERCIAL

## 2025-08-25 PROCEDURE — RXMED WILLOW AMBULATORY MEDICATION CHARGE: Performed by: FAMILY MEDICINE

## (undated) DEVICE — LACTATED RINGERS INJ 1000 ML - (14EA/CA 60CA/PF)

## (undated) DEVICE — DRAPE IOBAN II INCISE 23X17 - (10EA/BX 4BX/CA)

## (undated) DEVICE — DRESSING TRANSPARENT FILM TEGADERM 4 X 4.75" (50EA/BX)"

## (undated) DEVICE — FIBERWIRE 2.0 (12EA/BX)

## (undated) DEVICE — PACK LOWER EXTREMITY - (2/CA)

## (undated) DEVICE — PENCIL ELECTSURG 10FT HLSTR - WITH BLADE (50EA/CA)

## (undated) DEVICE — SENSOR OXIMETER ADULT SPO2 RD SET (20EA/BX)

## (undated) DEVICE — SODIUM CHL IRRIGATION 0.9% 1000ML (12EA/CA)

## (undated) DEVICE — GLOVE BIOGEL SZ 8 SURGICAL PF LTX - (50PR/BX 4BX/CA)

## (undated) DEVICE — PAD PREP 24 X 48 CUFFED - (100/CA)

## (undated) DEVICE — BLADE SURGICAL #15 - (50/BX 3BX/CA)

## (undated) DEVICE — TUBING CASSETTE CROSSFLOW INTEGRATED (10EA/CA)

## (undated) DEVICE — SHAVER4.0 AGGRESSIVE + FORMLA (5EA/BX)

## (undated) DEVICE — ELECTRODE DUAL RETURN W/ CORD - (50/PK)

## (undated) DEVICE — SUTURE GENERAL

## (undated) DEVICE — SUTURE 3-0 MONOCRYL PLUS PS-2 - (12/BX)

## (undated) DEVICE — SODIUM CHL. IRRIGATION 0.9% 3000ML (4EA/CA 65CA/PF)

## (undated) DEVICE — SLEEVE VASO CALF MED - (10PR/CA)

## (undated) DEVICE — CHLORAPREP 26 ML APPLICATOR - ORANGE TINT(25/CA)

## (undated) DEVICE — GOWN WARMING STANDARD FLEX - (30/CA)

## (undated) DEVICE — SUTURE 3-0 ETHILON FS-1 - (36/BX) 30 INCH

## (undated) DEVICE — SLING ORTH UNV TIETX VLFM ARM

## (undated) DEVICE — TOWEL STOP TIMEOUT SAFETY FLAG (40EA/CA)

## (undated) DEVICE — CANNULA TWIST IN 8.25MM X 7CM (5/BX)

## (undated) DEVICE — SHAVER 5.5 BRL FORMULA 6 FLTE (5EA/BX)

## (undated) DEVICE — GLOVE, LITE (PAIR)

## (undated) DEVICE — PACK SHOULDER ARTHROSCOPY SM - (2EA/CA)

## (undated) DEVICE — SUTURE RETRIEVER HEWSON LIGA - 6/BX

## (undated) DEVICE — PACK ARTHROSCOPY - (2EA//CA)

## (undated) DEVICE — TUBING DAY USE W/CARTRIDGE (10EA/BX)

## (undated) DEVICE — DRAPE U SPLIT IMP 54 X 76 - (24/CA)

## (undated) DEVICE — DRAPE LARGE 3 QUARTER - (20/CA)

## (undated) DEVICE — DRAPETIBURON SHOULDER W/POUCH - (5EA/CA)

## (undated) DEVICE — ABLATOR WAND SERFAS 90-S CRUISE

## (undated) DEVICE — SPIDER SHOULDER HOLDER (12EA/BX)

## (undated) DEVICE — GLOVE BIOGEL INDICATOR SZ 8 SURGICAL PF LTX - (50/BX 4BX/CA)